# Patient Record
Sex: FEMALE | Race: OTHER | HISPANIC OR LATINO | Employment: OTHER | URBAN - METROPOLITAN AREA
[De-identification: names, ages, dates, MRNs, and addresses within clinical notes are randomized per-mention and may not be internally consistent; named-entity substitution may affect disease eponyms.]

---

## 2018-08-28 ENCOUNTER — HOSPITAL ENCOUNTER (EMERGENCY)
Facility: HOSPITAL | Age: 63
Discharge: HOME | End: 2018-08-28
Attending: EMERGENCY MEDICINE | Admitting: EMERGENCY MEDICINE
Payer: COMMERCIAL

## 2018-08-28 ENCOUNTER — APPOINTMENT (EMERGENCY)
Dept: CT IMAGING | Facility: HOSPITAL | Age: 63
End: 2018-08-28
Payer: COMMERCIAL

## 2018-08-28 ENCOUNTER — APPOINTMENT (EMERGENCY)
Dept: RADIOLOGY | Facility: HOSPITAL | Age: 63
End: 2018-08-28
Payer: COMMERCIAL

## 2018-08-28 VITALS
OXYGEN SATURATION: 95 % | TEMPERATURE: 97.6 F | HEART RATE: 87 BPM | RESPIRATION RATE: 18 BRPM | SYSTOLIC BLOOD PRESSURE: 182 MMHG | DIASTOLIC BLOOD PRESSURE: 98 MMHG | WEIGHT: 233 LBS

## 2018-08-28 DIAGNOSIS — I10 HYPERTENSION, UNSPECIFIED TYPE: Primary | ICD-10-CM

## 2018-08-28 DIAGNOSIS — E11.9 DIABETES MELLITUS (HCC): ICD-10-CM

## 2018-08-28 LAB
ALBUMIN SERPL BCP-MCNC: 4.1 G/DL (ref 3–5.2)
ALP SERPL-CCNC: 80 U/L (ref 43–122)
ALT SERPL W P-5'-P-CCNC: 31 U/L (ref 9–52)
ANION GAP SERPL CALCULATED.3IONS-SCNC: 7 MMOL/L (ref 5–14)
AST SERPL W P-5'-P-CCNC: 21 U/L (ref 14–36)
ATRIAL RATE: 77 BPM
ATRIAL RATE: 81 BPM
BASOPHILS # BLD AUTO: 0.1 THOUSANDS/ΜL (ref 0–0.1)
BASOPHILS NFR BLD AUTO: 1 % (ref 0–1)
BILIRUB SERPL-MCNC: 0.4 MG/DL
BUN SERPL-MCNC: 13 MG/DL (ref 5–25)
CALCIUM SERPL-MCNC: 10 MG/DL (ref 8.4–10.2)
CHLORIDE SERPL-SCNC: 104 MMOL/L (ref 97–108)
CO2 SERPL-SCNC: 28 MMOL/L (ref 22–30)
CREAT SERPL-MCNC: 0.72 MG/DL (ref 0.6–1.2)
EOSINOPHIL # BLD AUTO: 0.1 THOUSAND/ΜL (ref 0–0.4)
EOSINOPHIL NFR BLD AUTO: 2 % (ref 0–6)
ERYTHROCYTE [DISTWIDTH] IN BLOOD BY AUTOMATED COUNT: 16.2 %
GFR SERPL CREATININE-BSD FRML MDRD: 89 ML/MIN/1.73SQ M
GLUCOSE SERPL-MCNC: 114 MG/DL (ref 70–99)
HCT VFR BLD AUTO: 40.3 % (ref 36–46)
HGB BLD-MCNC: 12.9 G/DL (ref 12–16)
LYMPHOCYTES # BLD AUTO: 2.7 THOUSANDS/ΜL (ref 0.5–4)
LYMPHOCYTES NFR BLD AUTO: 33 % (ref 20–50)
MCH RBC QN AUTO: 25 PG (ref 26–34)
MCHC RBC AUTO-ENTMCNC: 32 G/DL (ref 31–36)
MCV RBC AUTO: 78 FL (ref 80–100)
MONOCYTES # BLD AUTO: 0.5 THOUSAND/ΜL (ref 0.2–0.9)
MONOCYTES NFR BLD AUTO: 6 % (ref 1–10)
NEUTROPHILS # BLD AUTO: 4.9 THOUSANDS/ΜL (ref 1.8–7.8)
NEUTS SEG NFR BLD AUTO: 58 % (ref 45–65)
NT-PROBNP SERPL-MCNC: 31.1 PG/ML (ref 0–299)
P AXIS: 38 DEGREES
P AXIS: 45 DEGREES
PLATELET # BLD AUTO: 281 THOUSANDS/UL (ref 150–450)
PLATELET BLD QL SMEAR: ADEQUATE
PMV BLD AUTO: 9.4 FL (ref 8.9–12.7)
POTASSIUM SERPL-SCNC: 3.9 MMOL/L (ref 3.6–5)
PR INTERVAL: 146 MS
PR INTERVAL: 160 MS
PROT SERPL-MCNC: 8.3 G/DL (ref 5.9–8.4)
QRS AXIS: -12 DEGREES
QRS AXIS: -14 DEGREES
QRSD INTERVAL: 90 MS
QRSD INTERVAL: 98 MS
QT INTERVAL: 408 MS
QT INTERVAL: 418 MS
QTC INTERVAL: 461 MS
QTC INTERVAL: 485 MS
RBC # BLD AUTO: 5.15 MILLION/UL (ref 4–5.2)
RBC MORPH BLD: NORMAL
SODIUM SERPL-SCNC: 139 MMOL/L (ref 137–147)
T WAVE AXIS: 20 DEGREES
T WAVE AXIS: 23 DEGREES
TROPONIN I SERPL-MCNC: <0.01 NG/ML (ref 0–0.03)
VENTRICULAR RATE: 77 BPM
VENTRICULAR RATE: 81 BPM
WBC # BLD AUTO: 8.4 THOUSAND/UL (ref 4.5–11)

## 2018-08-28 PROCEDURE — 80053 COMPREHEN METABOLIC PANEL: CPT | Performed by: EMERGENCY MEDICINE

## 2018-08-28 PROCEDURE — 85025 COMPLETE CBC W/AUTO DIFF WBC: CPT | Performed by: EMERGENCY MEDICINE

## 2018-08-28 PROCEDURE — 93010 ELECTROCARDIOGRAM REPORT: CPT | Performed by: INTERNAL MEDICINE

## 2018-08-28 PROCEDURE — 71045 X-RAY EXAM CHEST 1 VIEW: CPT

## 2018-08-28 PROCEDURE — 36415 COLL VENOUS BLD VENIPUNCTURE: CPT | Performed by: EMERGENCY MEDICINE

## 2018-08-28 PROCEDURE — 84484 ASSAY OF TROPONIN QUANT: CPT | Performed by: EMERGENCY MEDICINE

## 2018-08-28 PROCEDURE — 93005 ELECTROCARDIOGRAM TRACING: CPT

## 2018-08-28 PROCEDURE — 70450 CT HEAD/BRAIN W/O DYE: CPT

## 2018-08-28 PROCEDURE — 99284 EMERGENCY DEPT VISIT MOD MDM: CPT

## 2018-08-28 PROCEDURE — 83880 ASSAY OF NATRIURETIC PEPTIDE: CPT | Performed by: EMERGENCY MEDICINE

## 2018-08-28 RX ORDER — SERTRALINE HYDROCHLORIDE 100 MG/1
TABLET, FILM COATED ORAL DAILY
COMMUNITY
End: 2020-01-15

## 2018-08-28 RX ORDER — LISINOPRIL 20 MG/1
20 TABLET ORAL DAILY
Qty: 20 TABLET | Refills: 0 | Status: SHIPPED | OUTPATIENT
Start: 2018-08-28 | End: 2020-03-05 | Stop reason: SDUPTHER

## 2018-08-28 RX ORDER — LISINOPRIL 20 MG/1
20 TABLET ORAL DAILY
COMMUNITY
End: 2020-02-27

## 2018-08-28 RX ORDER — ALBUTEROL SULFATE 90 UG/1
2 AEROSOL, METERED RESPIRATORY (INHALATION) EVERY 6 HOURS PRN
COMMUNITY
End: 2020-02-27

## 2018-08-28 RX ORDER — ATORVASTATIN CALCIUM 20 MG/1
20 TABLET, FILM COATED ORAL DAILY
COMMUNITY
End: 2020-02-27

## 2018-08-28 RX ORDER — ATORVASTATIN CALCIUM 10 MG/1
20 TABLET, FILM COATED ORAL DAILY
Qty: 40 TABLET | Refills: 0 | Status: SHIPPED | OUTPATIENT
Start: 2018-08-28 | End: 2020-01-15

## 2018-08-28 NOTE — ED PROVIDER NOTES
History  Chief Complaint   Patient presents with    High Blood Pressure     pt states ran out of HTN meds 4 days ago, since then c/o dizzy and blurred vision  no headache, no cp/sob      Pt just came from her PCP's office  He sent her here for BP evaluation but he failed to fill her prescriptions INCLUDING her BP med  History provided by:  Patient  Headache   Pain location:  Generalized  Severity at highest:  Unable to specify  Onset quality:  Unable to specify  Timing:  Unable to specify  Progression:  Resolved  Chronicity:  New  Similar to prior headaches: no    Context: not activity, not exposure to bright light, not caffeine, not coughing, not defecating, not eating, not stress, not exposure to cold air, not intercourse, not loud noise and not straining    Context comment:  Out of her BP med for 4 days  Relieved by:  Nothing  Worsened by:  Nothing  Ineffective treatments:  None tried  Associated symptoms: dizziness (lightheadedness)    Associated symptoms: no abdominal pain, no back pain, no blurred vision, no congestion, no cough, no diarrhea, no drainage, no ear pain, no eye pain, no facial pain, no fatigue, no fever, no focal weakness, no hearing loss, no loss of balance, no myalgias, no nausea, no near-syncope, no neck pain, no neck stiffness, no numbness, no paresthesias, no photophobia, no seizures, no sinus pressure, no sore throat, no swollen glands, no syncope, no tingling, no URI, no visual change, no vomiting and no weakness        Prior to Admission Medications   Prescriptions Last Dose Informant Patient Reported? Taking?    albuterol (PROVENTIL HFA,VENTOLIN HFA) 90 mcg/act inhaler   Yes Yes   Sig: Inhale 2 puffs every 6 (six) hours as needed for wheezing   atorvastatin (LIPITOR) 20 mg tablet   Yes Yes   Sig: Take 20 mg by mouth daily   lisinopril (ZESTRIL) 20 mg tablet   Yes Yes   Sig: Take 20 mg by mouth daily   metFORMIN (GLUCOPHAGE) 500 mg tablet   Yes Yes   Sig: Take 500 mg by mouth 2 (two) times a day with meals   sertraline (ZOLOFT) 100 mg tablet   Yes Yes   Sig: Take by mouth daily      Facility-Administered Medications: None       Past Medical History:   Diagnosis Date    Asthma     Depression     Diabetes mellitus ([facility] [address] )     Hypertension     Kidney stone        Past Surgical History:   Procedure Laterality Date    APPENDECTOMY      CHOLECYSTECTOMY         History reviewed  No pertinent family history  I have reviewed and agree with the history as documented  Social History   Substance Use Topics    Smoking status: Never Smoker    Smokeless tobacco: Never Used    Alcohol use No        Review of Systems   Constitutional: Negative  Negative for fatigue and fever  HENT: Negative  Negative for congestion, ear pain, hearing loss, postnasal drip, sinus pressure and sore throat  Eyes: Negative  Negative for blurred vision, photophobia and pain  Respiratory: Negative  Negative for cough  Cardiovascular: Negative  Negative for syncope and near-syncope  Gastrointestinal: Negative  Negative for abdominal pain, diarrhea, nausea and vomiting  Endocrine: Negative  Genitourinary: Negative  Musculoskeletal: Negative  Negative for back pain, myalgias, neck pain and neck stiffness  Skin: Negative  Allergic/Immunologic: Negative  Neurological: Positive for dizziness (lightheadedness) and headaches  Negative for focal weakness, seizures, weakness, numbness, paresthesias and loss of balance  Hematological: Negative  Psychiatric/Behavioral: Negative  Physical Exam  Physical Exam   Constitutional: She is oriented to person, place, and time  She appears well-developed and well-nourished  No distress  Leg very comfortable structure wall texting on her fall without any difficulty  Nontoxic-appearing easily smiling very pleasant to the Farhat Shireen her young daughter is at the bedside the for the bed   HENT:   Head: Normocephalic and atraumatic     Right Ear: External ear normal    Left Ear: External ear normal    Nose: Nose normal    Mouth/Throat: Oropharynx is clear and moist    Eyes: Conjunctivae and EOM are normal  Pupils are equal, round, and reactive to light  Neck: Normal range of motion  Neck supple  No JVD present  No tracheal deviation present  No thyromegaly present  Cardiovascular: Normal rate, regular rhythm, normal heart sounds and intact distal pulses  Exam reveals no gallop and no friction rub  No murmur heard  Pulmonary/Chest: Effort normal and breath sounds normal  No stridor  No respiratory distress  She has no wheezes  She has no rales  She exhibits no tenderness  Abdominal: Soft  Bowel sounds are normal  She exhibits no distension and no mass  There is no tenderness  There is no rebound and no guarding  No hernia  Genitourinary: Rectal exam shows guaiac negative stool  Musculoskeletal: Normal range of motion  She exhibits no edema, tenderness or deformity  Lymphadenopathy:     She has no cervical adenopathy  Neurological: She is alert and oriented to person, place, and time  She displays normal reflexes  No cranial nerve deficit or sensory deficit  She exhibits normal muscle tone  Coordination normal    Skin: Skin is warm and dry  Capillary refill takes less than 2 seconds  No rash noted  She is not diaphoretic  No erythema  No pallor  Psychiatric: She has a normal mood and affect  Her behavior is normal  Judgment and thought content normal    Nursing note and vitals reviewed        Vital Signs  ED Triage Vitals   Temperature Pulse Respirations Blood Pressure SpO2   08/28/18 1458 08/28/18 1458 08/28/18 1458 08/28/18 1458 08/28/18 1458   97 6 °F (36 4 °C) 86 18 (!) 183/101 96 %      Temp Source Heart Rate Source Patient Position - Orthostatic VS BP Location FiO2 (%)   08/28/18 1458 08/28/18 1741 08/28/18 1458 08/28/18 1458 --   Temporal Monitor Lying Left arm       Pain Score       --                  Vitals:    08/28/18 1458 08/28/18 1536 08/28/18 1741   BP: (!) 183/101 152/88 (!) 182/98   Pulse: 86  87   Patient Position - Orthostatic VS: Lying Lying Lying       Visual Acuity      ED Medications  Medications - No data to display    Diagnostic Studies  Results Reviewed     Procedure Component Value Units Date/Time    BNP [[id number]]  (Normal) Collected:  08/28/18 1555    Lab Status:  Final result Specimen:  Blood from Arm, Left Updated:  08/28/18 1657     NT-proBNP 31 1 pg/mL     Troponin I [[id number]]  (Normal) Collected:  08/28/18 1510    Lab Status:  Final result Specimen:  Blood from Arm, Left Updated:  08/28/18 1608     Troponin I <0 01 ng/mL     Comprehensive metabolic panel [[id number]]  (Abnormal) Collected:  08/28/18 1510    Lab Status:  Final result Specimen:  Blood from Arm, Left Updated:  08/28/18 1551     Sodium 139 mmol/L      Potassium 3 9 mmol/L      Chloride 104 mmol/L      CO2 28 mmol/L      ANION GAP 7 mmol/L      BUN 13 mg/dL      Creatinine 0 72 mg/dL      Glucose 114 (H) mg/dL      Calcium 10 0 mg/dL      AST 21 U/L      ALT 31 U/L      Alkaline Phosphatase 80 U/L      Total Protein 8 3 g/dL      Albumin 4 1 g/dL      Total Bilirubin 0 40 mg/dL      eGFR 89 ml/min/1 73sq m     Narrative:         National Kidney Disease Education Program recommendations are as follows:  GFR calculation is accurate only with a steady state creatinine  Chronic Kidney disease less than 60 ml/min/1 73 sq  meters  Kidney failure less than 15 ml/min/1 73 sq  meters      CBC and differential [[id number]]  (Abnormal) Collected:  08/28/18 1510    Lab Status:  Final result Specimen:  Blood from Arm, Left Updated:  08/28/18 1536     WBC 8 40 Thousand/uL      RBC 5 15 Million/uL      Hemoglobin 12 9 g/dL      Hematocrit 40 3 %      MCV 78 (L) fL      MCH 25 0 (L) pg      MCHC 32 0 g/dL      RDW 16 2 (H) %      MPV 9 4 fL      Platelets 049 Thousands/uL      Neutrophils Relative 58 %      Lymphocytes Relative 33 %      Monocytes Relative 6 % Eosinophils Relative 2 %      Basophils Relative 1 %      Neutrophils Absolute 4 90 Thousands/µL      Lymphocytes Absolute 2 70 Thousands/µL      Monocytes Absolute 0 50 Thousand/µL      Eosinophils Absolute 0 10 Thousand/µL      Basophils Absolute 0 10 Thousands/µL                  CT head without contrast   Final Result by Lucia Navas MD (08/28 1638)      No acute intracranial abnormality  Workstation performed: [id number]         XR chest 1 view portable   Final Result by Lucia Navas MD (08/28 1627)      No acute cardiopulmonary disease  Workstation performed: [id number]                    Procedures  Procedures       Phone Contacts  ED Phone Contact    ED Course  ED Course as of Aug 28 2057   Tue Aug 28, 2018   1615 Spoke with paternal grandson here in the emergency department  He is bilingual   Talking about his grandmother having some anxiety issue  Spoke with him and asked why dated that discussed this with her doctor who is office the just left       1372  is at the bedside  Our ER Abdirahman jha ed translate to the nothing was lost in translation  Both the patient and the patient's  denies that she has any anxiety issues  1639 Informed them that they are going to have a very interesting family dinner tonight                                MDM  Number of Diagnoses or Management Options  Diabetes mellitus ([facility] [address] ):    Hypertension, unspecified type:      Amount and/or Complexity of Data Reviewed  Clinical lab tests: ordered and reviewed  Tests in the radiology section of CPT®: ordered and reviewed  Tests in the medicine section of CPT®: ordered and reviewed    Patient Progress  Patient progress: improved    CritCare Time    Disposition  Final diagnoses:   Hypertension, unspecified type   Diabetes mellitus ([facility] [address] )     Time reflects when diagnosis was documented in both MDM as applicable and the Disposition within this note     Time User Action Codes Description Comment    8/28/2018  5:09 PM Janeen CASH Add [I10] Hypertension, unspecified type     8/28/2018  5:10 PM Carlos Alberto Farleys Add [E11 9] Diabetes mellitus [facility])       ED Disposition     ED Disposition Condition Comment    Discharge  Asim Comes discharge to home/self care  Condition at discharge: Stable        Follow-up Information     Follow up With Specialties Details Why Contact Info    With your new primary care provider that you saw today  In 1 day            Discharge Medication List as of 8/28/2018  5:15 PM      START taking these medications    Details   !! atorvastatin (LIPITOR) 10 mg tablet Take 2 tablets (20 mg total) by mouth daily, Starting Tue 8/28/2018, Print      !! lisinopril (ZESTRIL) 20 mg tablet Take 1 tablet (20 mg total) by mouth daily, Starting Tue 8/28/2018, Print      !! metFORMIN (GLUCOPHAGE) 500 mg tablet Take 1 tablet (500 mg total) by mouth 2 (two) times a day with meals, Starting Tue 8/28/2018, Print       !! - Potential duplicate medications found  Please discuss with provider  CONTINUE these medications which have NOT CHANGED    Details   albuterol (PROVENTIL HFA,VENTOLIN HFA) 90 mcg/act inhaler Inhale 2 puffs every 6 (six) hours as needed for wheezing, Historical Med      !! atorvastatin (LIPITOR) 20 mg tablet Take 20 mg by mouth daily, Historical Med      !! lisinopril (ZESTRIL) 20 mg tablet Take 20 mg by mouth daily, Historical Med      !! metFORMIN (GLUCOPHAGE) 500 mg tablet Take 500 mg by mouth 2 (two) times a day with meals, Historical Med      sertraline (ZOLOFT) 100 mg tablet Take by mouth daily, Historical Med       !! - Potential duplicate medications found  Please discuss with provider  No discharge procedures on file      ED Provider  Electronically Signed by           Emelia Diaz MD  08/28/18 0755

## 2018-08-28 NOTE — ED NOTES
Patient coming from PCP office for HTN, reports 4 days without medications  C/o dizziness, blurred vision   Denies chest pain, shortness of breath, headache, nvd       Randy Short RN  08/28/18 5877

## 2018-08-28 NOTE — DISCHARGE INSTRUCTIONS
Hipertensión crónica   LO QUE NECESITA SABER:   La hipertensión es la presión arterial sandor  La presión arterial es la fuerza que ejerce la dominguez contra las saldana de las arterias  La presión arterial normal debería estar a menos de 120/80  La pre-hipertensión estaría entre 120/80 y 139/ 80  La presión arterial sandor estaría a 140/90 o más sandor  La hipertensión causa que vallecillo presión arterial se eleve tanto que vallecillo corazón se ve forzado a trabajar [employer] de lo normal  [place] puede dañar vallecillo corazón  La hipertensión crónica es lawrence condición de joey plazo que usted puede controlar con un estilo de [place] o con medicamentos  La presión [place] a proteger fiona órganos gavi vallecillo corazón, pulmones, cerebro, y riñones  INSTRUCCIONES SOBRE EL SANDOR HOSPITALARIA:   Llame al 911 en luisa de presentar lo siguiente:   · Usted tiene malestar en el pecho que se siente gavi estrujamiento, presión, Ema Marines o dolor  · Usted se siente confundido o tiene dificultad para hablar  · Repentinamente se siente aturdido o con dificultad para respirar  · Usted tiene dolor o [employer] espalda, [place], [place], abdomen o Lou Catching  Busque atención médica de inmediato si:   · Usted tiene un forrest dolor de radames o pérdida de la visión  · Usted tiene debilidad en un brazo o en lawrence pierna  Pregúntele a vallecillo Tiffany Rumps vitaminas y minerales son adecuados para usted  · Usted se siente mareado, confundido, somnoliento o gavi si se fuera a desmayar  · Usted se ha tomado vallecillo medicamento para la presión arterial yong vallecillo presión arterial todavía está más sandor de lo que le indicó vallecillo médico     · Usted tiene preguntas o inquietudes acerca de vallecillo condición o cuidado  Medicamentos:  Es posible que usted necesite alguno de los siguientes:  · Medicamento  podría usarse para ayudar a disminuir la presión arterial  Es posible que necesite más de un tipo de [place]  [place] el medicamento exactamente gavi indicado  · Diuréticos  ayudan a eliminar el exceso de líquido que se acumula en el organismo  [place] contribuirá a bajar vallecillo presión arterial  Es posible que orine más seguido mientras emma dalia medicamento  · Los medicamentos para el colesterol  ayudan a bajar los niveles de [place]  Un nivel bajo de colesterol ayuda a prevenir enfermedades cardíacas y facilita el control de la presión arterial      · [place] fiona medicamentos gavi se le haya indicado  Consulte con vallecillo médico si usted apoorva que vallecillo medicamento no le está ayudando o si presenta efectos secundarios  Infórmele si es alérgico a cualquier medicamento  Mantenga lawrence lista actualizada de los [place], las vitaminas y los productos herbales que emma  Incluya los siguientes datos de los medicamentos: cantidad, frecuencia y motivo de administración  Traiga con usted la lista o los envases de la píldoras a fiona citas de seguimiento  Lleve la lista de los medicamentos con usted en luisa de lawrence emergencia  Acuda a fiona consultas de control con vallecillo médico según le indicaron  Usted necesitará regresar para medir vallecillo presión arterial y realizar otros exámenes de laboratorio  Anote fiona preguntas para que se acuerde de hacerlas sumit fiona visitas  Controle la hipertensión crónica:  Hable con vallecillo médico sobre las siguientes recomendaciones y otras formas de controlar la hipertensión:  · Tómese la presión arterial en vallecillo casa  Siéntese y descanse por 5 minutos antes de tomarse la presión arterial  Extienda vallecillo brazo y apóyelo en lawrence superficie plana  Vallecillo brazo debe estar a la misma altura que vallecillo corazón  Siga las instrucciones que vienen con el monitor para la presión arterial o tensiómetro  Si es posible tome por lo menos 2 lecturas de la presión cada vez  Tómese la presión arterial por lo [employer] al día a la misma hora todos los días, lawrence en la mañana y la otra en la noche   Mantenga un registro de las lecturas de vallecillo presión arterial y llévelo consigo a fiona consultas  Pregúntele a vallecillo médico cuál debería ser vallecillo presión arterial            · Limite el sodio (la sal) gavi se le haya indicado  Demasiado sodio puede afectar el equilibrio de líquidos  Revise las etiquetas para buscar alimentos bajos en sodio o sin sal agregada  Algunos alimentos bajos en sodio utilizan sales de potasio para añadir sabor  Demasiado potasio también puede causar problemas de [place]  Vallecillo médico le dirá qué cantidad de sodio y potasio es ramos para el consumo en un día  Él puede recomendarle que limite el sodio a 2,300 mg al día  · Siga el plan de comidas recomendado por vallecillo médico   Un dietista o médico puede darle más información sobre planes de bajo contenido de sodio o el plan de alimentación DASH (enfoques dietéticos para detener la hipertensión)  El plan DASH es bajo en sodio, grasas saturadas y grasa total  Es alto en potasio, calcio y [place]  · Ejercítese para mantener un peso saludable  Realice actividad física por lo menos 30 minutos al día, la mayoría de los días de la [place]  [place] ayudará a bajar vallecillo presión arterial  Pida más información acerca de un plan de ejercicio adecuado para [facility]  · [address] [place] [address] estrés  [place] podría ayudarlo a bajar vallecillo presión arterial  Aprenda sobre formas de relajarse, gavi respiración profunda o escuchar música  · Limite el consumo de alcohol  Las mujeres deberían limitar el consumo de alcohol a 1 bebida por día  Los hombres deberían limitar el consumo de alcohol a 2 tragos al día  Un trago equivale a 12 onzas de cerveza, 5 onzas de vino o 1 onza y ½ de licor  · No fume  La nicotina y otros químicos en los cigarrillos y cigarros pueden aumentar vallecillo presión arterial y también pueden provocar daño al pulmón  Pida información a vallecillo médico si usted actualmente fuma y necesita ayuda para dejar de fumar  Los cigarrillos electrónicos o tabaco sin humo todavía contienen nicotina   Consulte con vallecillo médico antes de [employer] productos  © 2017 [facility] Information is for End User's use only and may not be sold, redistributed or otherwise used for commercial purposes  All illustrations and images included in CareNotes® are the copyrighted property of A D A M , Inc  or Brad Anne  Esta información es sólo para uso en educación  Vallecillo intención no es darle un consejo médico sobre enfermedades o tratamientos  Colsulte con vallecillo Maunel Cranker farmacéutico antes de seguir cualquier régimen médico para saber si es seguro y efectivo para usted  10% - bad control"> 10% - bad control,Hemoglobin A1c (HbA1c) greater than 10% indicating poor diabetic control,Haemoglobin A1c greater than 10% indicating poor diabetic control">   Diabetes Mellitus Type 2 in Adults, Ambulatory Care   American Diabetes Association : Standards of medical care in diabetes--2014  Diabetes Care 2014; 40 Suppl 1:S14-S80  American Diabetes Association: Standards of medical care in diabetes--2013  Diabetes Care 2013; 36 Suppl 1:S11-S66  American Diabetes Association: Standards of medical care in diabetes--2012  Diabetes Care 2012; 35 Suppl 1:S11-S63  Advisory Committee on [employer]: Recommended Adult Immunization Schedule: [place], 2012  Karlie Intern Med 2012; 156(3):211-217  American Diabetes Association: Standards of Medical Care in Diabetes-2011  Diabetes Care 2011; 34(S1):S11-S61  Product Information: JANUVIA(R) oral tablets, sitagliptin oral tablets  [address] [place], [place], 2010  American Diabetes Association: Standards of medical care in diabetes--2010  Diabetes Care 2010; 33 Suppl 1:S11-S61  Product Information: VICTOZA(R) solution for subcutaneous injection, liraglutide rDNA origin solution for subcutaneous injection  [address] [address]  American Diabetes Association: Diagnosis and classification of diabetes mellitus  Diabetes Care, Jan 2005;28(suppl 1):S37-S42    American Diabetes Association: Physical activity/exercise and diabetes mellitus  Diabetes Care, 2003;26:S73-S77  American Diabetes Association : Standards of medical care for patients with diabetes mellitus    Diabetes Care  , 2003; 26(suppl 1):33S-50S  Leigh BK : Diabetes mellitus and its chronic complications    AORN J  , 2002; 76(2):266-282  Bruce CG : Current management of diabetes mellitus in adults  [facility]  , 2003; 11(5):32-37  Marshall Johnson C : Risk factors for type 2 diabetes mellitus  Tamar Olive Cardiovasc Nurs,   2002; 16(2):17-23  Tonny Kendrick DW : Pathophysiology of diabetes mellitus    [address] , 2004; 27(2):113-125  Mary Lou LF, Aaron COUCH, João MN : In-hospital management of type 2 diabetes mellitus    [facility] Am  , 2004; 03:5147-7268  Erwin Herrera, Kala I, Zeus K et al  : Evidence-based nutritional approaches to the treatment and prevention of diabetes mellitus    Nutr Metab Cardiovasc Dis  , 2004; 14(6):373-394  Cinthia Medrano DM  : Clinical practice  Initial management of glycemia in type 2 diabetes mellitus  [facility]  , 2002; 347(17):5315-3446  Brook WALKER : Mechanisms in the development of type 2 diabetes mellitus  Tamradevante Nayak Caradiovasc Nurs  , 2002; 16(2):1-16  American Diabetes Association[de-identified] Clinical Practice Recommendations 2003    Diabetes Care  Available at http://care  diabetesjournals  org/content/vol26/suppl_1/ , (cited 10/22/03)  American Diabetes Association[de-identified] Clinical Practice Recommendations 2004    Diabetes Care 2004; 27(1): S5-S10  Available at: http://care  diabetesjournals  org/content/vol27/suppl_1/, (cited 2/25/04)  American Diabetes Association[de-identified] Alcohol    2003  Available at: www diabetes  org/health/nutrition/alcohol/alcohol jsp , (cited 10/23/03)  American Diabetes Association[de-identified] Hyperosmolar Hyperglycemic Nonketotic Syndrome (HHNS):   2003  Available at: www diabetes  org/type2/medical/HHNS jsp, (cited 10/29/03)    American Diabetes Association[de-identified] Nutrition Guide for People with Diabetes    2003  Available at: www diabetes  org/health/nutrition/nutrition_guide  jsp , (cited 10/22/03)  American Diabetes Association[de-identified] Safety Tips    2003  Available at: www diabetes  org/health/exercise/safety/25ways  jsp, (cited 10/23/03)  American Diabetes Association[de-identified] What is Type 2 Diabetes?   2003  Available at: www diabetes  org/type2/info/default jsp, (cited 10/29/03)  The American Dietetic Association: The [place] Dietetic Association and the [facility] Dietetic Association Manual of Clinical Dietetics, 6th ed    [address] , 2000  Lisa Sanchez [de-identified] The Fundability Manual of Diagnosis and Therapy (Internet Edition, 2003)  [facility]  Shari Sweeney Available at www [facility]/The [place] Reported/mmanual/home jsp , (2003)  Dorice Patch DM[de-identified] Initial Management of Glycemia in Type 2 Diabetes Mellitus    The [address] , 2002; 347(17):7561-6991  Automatic Data of Diabetes and Digestive and Kidney Diseases[de-identified] Diabetes Diagnosis    2003  Available at: http://diabetes  niddk nih gov/dm/pubs/diagnosis/index htm , (cited 10/23/2003)  Lisestt Sheets : The Redd Manual of Nursing Practice  7th ed  TAYE Graham: Alonso Thorpe, 2000  Frankie SHELTON, Taylor Velez GP et al :: Effects of Exercise on Glycemic Control and Body Mass in Type 2 Diabetes Mellitus    The Journal of the [address] , 2001; [id number]): 2181-6305  © 2014 [address] is for End User's use only and may not be sold, redistributed or otherwise used for commercial purposes  All illustrations and images included in CareNotes® are the copyrighted property of [employer]  or Brad Anne  The above information is an  only  It is not intended as medical advice for individual conditions or treatments   Talk to your doctor, nurse or pharmacist before following any medical regimen to see if it is safe and effective for you  Chronic Hypertension   WHAT YOU NEED TO KNOW:   Hypertension is high blood pressure (BP)  Your BP is the force of your blood moving against the walls of your arteries  Normal BP is less than 120/80  Prehypertension is between 120/80 and 139/89  Hypertension is 140/90 or higher  Hypertension causes your BP to get so high that your heart has to work much harder than normal  This can damage your heart  Chronic hypertension is a long-term condition that you can control with a healthy lifestyle or medicines  A controlled blood pressure helps protect your organs, such as your heart, lungs, brain, and kidneys  DISCHARGE INSTRUCTIONS:   Call 911 for any of the following:   · You have discomfort in your chest that feels like squeezing, pressure, fullness, or pain  · You become confused or have difficulty speaking  · You suddenly feel lightheaded or have trouble breathing  · You have pain or discomfort in your back, neck, jaw, stomach, or arm  Seek care immediately if:   · You have a severe headache or vision loss  · You have weakness in an arm or leg  Contact your healthcare provider if:   · You feel faint, dizzy, confused, or drowsy  · You have been taking your BP medicine and your BP is still higher than your healthcare provider says it should be  · You have questions or concerns about your condition or care  Medicines: You may need any of the following:  · Medicine  may be used to help lower your BP  You may need more than one type of medicine  Take the medicine exactly as directed  · Diuretics  help decrease extra fluid that collects in your body  This will help lower your BP  You may urinate more often while you take this medicine  · Cholesterol medicine  helps lower your cholesterol level  A low cholesterol level helps prevent heart disease and makes it easier to control your blood pressure  · Take your medicine as directed    Contact your healthcare provider if you think your medicine is not helping or if you have side effects  Tell him or her if you are allergic to any medicine  Keep a list of the medicines, vitamins, and herbs you take  Include the amounts, and when and why you take them  Bring the list or the pill bottles to follow-up visits  Carry your medicine list with you in case of an emergency  Follow up with your healthcare provider as directed: You will need to return to have your blood pressure checked and to have other lab tests done  Write down your questions so you remember to ask them during your visits  Manage chronic hypertension:  Talk with your healthcare provider about these and other ways to manage hypertension:  · Take your BP at home  Sit and rest for 5 minutes before you take your BP  Extend your arm and support it on a flat surface  Your arm should be at the same level as your heart  Follow the directions that came with your BP monitor  If possible, take at least 2 BP readings each time  Take your BP at least twice a day at the same times each day, such as morning and evening  Keep a record of your BP readings and bring it to your follow-up visits  Ask your healthcare provider what your blood pressure should be  · Limit sodium (salt) as directed  Too much sodium can affect your fluid balance  Check labels to find low-sodium or no-salt-added foods  Some low-sodium foods use potassium salts for flavor  Too much potassium can also cause health problems  Your healthcare provider will tell you how much sodium and potassium are safe for you to have in a day  He or she may recommend that you limit sodium to 2,300 mg a day  · Follow the meal plan recommended by your healthcare provider  A dietitian or your provider can give you more information on low-sodium plans or the DASH (Dietary Approaches to Stop Hypertension) eating plan  The DASH plan is low in sodium, unhealthy fats, and total fat   It is high in potassium, calcium, and fiber  · Exercise to maintain a healthy weight  Exercise at least 30 minutes per day, on most days of the week  This will help decrease your blood pressure  Ask about the best exercise plan for you  · Decrease stress  This may help lower your BP  Learn ways to relax, such as deep breathing or listening to music  · Limit alcohol  Women should limit alcohol to 1 drink a day  Men should limit alcohol to 2 drinks a day  A drink of alcohol is 12 ounces of beer, 5 ounces of wine, or 1½ ounces of liquor  · Do not smoke  Nicotine and other chemicals in cigarettes and cigars can increase your BP and also cause lung damage  Ask your healthcare provider for information if you currently smoke and need help to quit  E-cigarettes or smokeless tobacco still contain nicotine  Talk to your healthcare provider before you use these products  © 2017 [address] Information is for End User's use only and may not be sold, redistributed or otherwise used for commercial purposes  All illustrations and images included in CareNotes® are the copyrighted property of A D A M , Inc  or Brad Anne  The above information is an  only  It is not intended as medical advice for individual conditions or treatments  Talk to your doctor, nurse or pharmacist before following any medical regimen to see if it is safe and effective for you  10% - bad control"> 10% - bad control,Hemoglobin A1c (HbA1c) greater than 10% indicating poor diabetic control,Haemoglobin A1c greater than 10% indicating poor diabetic control">   Diabetes Mellitus Type 2 in Adults, Ambulatory Care   American Diabetes Association : Standards of medical care in diabetes--2014  Diabetes Care 2014; 40 Suppl 1:S14-S80  American Diabetes Association: Standards of medical care in diabetes--2013  Diabetes Care 2013; 36 Suppl 1:S11-S66  American Diabetes Association: Standards of medical care in diabetes--2012   Diabetes Care 2012; 35 Suppl 1:S11-S63  Advisory Committee on [employer]: Recommended Adult Immunization Schedule: [place], 2012  Karlie Intern Med 2012; 156(3):211-217  American Diabetes Association: Standards of Medical Care in Diabetes-2011  Diabetes Care 2011; 34(S1):S11-S61  Product Information: JANUVIA(R) oral tablets, sitagliptin oral tablets  [address] [place], [place], 2010  American Diabetes Association: Standards of medical care in diabetes--2010  Diabetes Care 2010; 33 Suppl 1:S11-S61  Product Information: VICTOZA(R) solution for subcutaneous injection, liraglutide rDNA origin solution for subcutaneous injection  [address], [place], [facility]  American Diabetes Association: Diagnosis and classification of diabetes mellitus  Diabetes Care, Jan 2005;28(suppl 1):S37-S42  American Diabetes Association: Physical activity/exercise and diabetes mellitus  Diabetes Care, 2003;26:S73-S77  American Diabetes Association : Standards of medical care for patients with diabetes mellitus    Diabetes Care  , 2003; 26(suppl 1):33S-50S  Leigh BK : Diabetes mellitus and its chronic complications    AORN J  , 2002; 76(2):266-282  Bruce CG : Current management of diabetes mellitus in adults  [facility]  , 2003; 11(5):32-37  [facility] acute hospital C : Risk factors for type 2 diabetes mellitus  Michael Butterfield Cardiovasc Nurs,   2002; 16(2):17-23  Tonny Gordon : Pathophysiology of diabetes mellitus    [address] , 2004; 27(2):113-125  Mary Lou LF, Aaron MA, João MN : In-hospital management of type 2 diabetes mellitus    Med [facility] Am  , 2004; 42:7399-5887  Ankur Amaya, Kala I, Zeus K et al  : Evidence-based nutritional approaches to the treatment and prevention of diabetes mellitus    Nutr Metab Cardiovasc Dis  , 2004; 14(6):373-394  Isabella Seals DM  : Clinical practice  Initial management of glycemia in type 2 diabetes mellitus   Arelieileen Montiels J Med  , 2002; 347(22):7567-7737  [place] WALKER : Mechanisms in the development of type 2 diabetes mellitus  [facility]  , 2002; 16(2):1-16  American Diabetes Association[de-identified] Clinical Practice Recommendations 2003    Diabetes Care  Available at http://care  diabetesjournals  org/content/vol26/suppl_1/ , (cited 10/22/03)  American Diabetes Association[de-identified] Clinical Practice Recommendations 2004    Diabetes Care 2004; 27(1): S5-S10  Available at: http://care  diabetesjournals  org/content/vol27/suppl_1/, (cited 2/25/04)  American Diabetes Association[de-identified] Alcohol    2003  Available at: www diabetes  org/health/nutrition/alcohol/alcohol jsp , (cited 10/23/03)  American Diabetes Association[de-identified] Hyperosmolar Hyperglycemic Nonketotic Syndrome (HHNS):   2003  Available at: www diabetes  org/type2/medical/HHNS jsp, (cited 10/29/03)  American Diabetes Association[de-identified] Nutrition Guide for People with Diabetes    2003  Available at: www diabetes  org/health/nutrition/nutrition_guide  jsp , (cited 10/22/03)  American Diabetes Association[de-identified] Safety Tips    2003  Available at: www diabetes  org/health/exercise/safety/25ways  jsp, (cited 10/23/03)  American Diabetes Association[de-identified] What is Type 2 Diabetes?   2003  Available at: www diabetes  org/type2/info/default jsp, (cited 10/29/03)  The American Dietetic Association: The [place] Dietetic Association and the [facility] Dietetic Association Manual of Clinical Dietetics, 6th ed    [address] , 2000  Kailyn Jimenez [de-identified] The [employer] Manual of Diagnosis and Therapy (Internet Edition, 2003)  Medical Services, [facility]  Usman Jimenez Available at www [employer]iseed/mmanual/home jsp , (2003)  Davene Paget DM[de-identified] Initial Management of Glycemia in Type 2 Diabetes Mellitus    The [place] [address] of Medicine , 2002; 347(17):4535-6004  Automatic Data of Diabetes and Digestive and Kidney Diseases[de-identified] Diabetes Diagnosis    2003   Available at: http://diabetes  niddk nih gov/dm/pubs/diagnosis/index htm , (cited 10/23/2003)  Neli Asp : The Redd Manual of Nursing Practice  7th ed  TAYE Nassar: [place], 2000  Robyn Mercer et al :: Effects of Exercise on Glycemic Control and Body Mass in Type 2 Diabetes Mellitus    The Journal of the [address] [place] [address] , 2001; [id number]): 3850-2049  © 2014 [address] is for End User's use only and may not be sold, redistributed or otherwise used for commercial purposes  All illustrations and images included in CareNotes® are the copyrighted property of [employer]  or Brad Anne  The above information is an  only  It is not intended as medical advice for individual conditions or treatments  Talk to your doctor, nurse or pharmacist before following any medical regimen to see if it is safe and effective for you

## 2018-11-29 ENCOUNTER — TRANSCRIBE ORDERS (OUTPATIENT)
Dept: ADMINISTRATIVE | Facility: HOSPITAL | Age: 63
End: 2018-11-29

## 2018-11-29 DIAGNOSIS — Z12.31 VISIT FOR SCREENING MAMMOGRAM: Primary | ICD-10-CM

## 2018-12-17 ENCOUNTER — HOSPITAL ENCOUNTER (OUTPATIENT)
Dept: MAMMOGRAPHY | Facility: CLINIC | Age: 63
Discharge: HOME | End: 2018-12-17
Payer: COMMERCIAL

## 2018-12-17 VITALS — WEIGHT: 233 LBS | HEIGHT: 61 IN | BODY MASS INDEX: 43.99 KG/M2

## 2018-12-17 DIAGNOSIS — Z12.31 VISIT FOR SCREENING MAMMOGRAM: ICD-10-CM

## 2018-12-17 PROCEDURE — 77067 SCR MAMMO BI INCL CAD: CPT

## 2019-11-15 ENCOUNTER — APPOINTMENT (OUTPATIENT)
Dept: LAB | Facility: HOSPITAL | Age: 64
End: 2019-11-15
Payer: COMMERCIAL

## 2019-11-15 ENCOUNTER — TRANSCRIBE ORDERS (OUTPATIENT)
Dept: ADMINISTRATIVE | Facility: HOSPITAL | Age: 64
End: 2019-11-15

## 2019-11-15 DIAGNOSIS — Z79.899 ENCOUNTER FOR LONG-TERM (CURRENT) USE OF OTHER MEDICATIONS: ICD-10-CM

## 2019-11-15 DIAGNOSIS — F31.32 MODERATE DEPRESSED BIPOLAR I DISORDER (HCC): Primary | ICD-10-CM

## 2019-11-15 DIAGNOSIS — F31.32 MODERATE DEPRESSED BIPOLAR I DISORDER (HCC): ICD-10-CM

## 2019-11-15 LAB
25(OH)D3 SERPL-MCNC: 24.2 NG/ML (ref 30–100)
ALBUMIN SERPL BCP-MCNC: 4.3 G/DL (ref 3–5.2)
ALP SERPL-CCNC: 77 U/L (ref 43–122)
ALT SERPL W P-5'-P-CCNC: 29 U/L (ref 9–52)
ANION GAP SERPL CALCULATED.3IONS-SCNC: 5 MMOL/L (ref 5–14)
AST SERPL W P-5'-P-CCNC: 23 U/L (ref 14–36)
BASOPHILS # BLD AUTO: 0.1 THOUSANDS/ΜL (ref 0–0.1)
BASOPHILS NFR BLD AUTO: 1 % (ref 0–1)
BILIRUB SERPL-MCNC: 0.5 MG/DL
BUN SERPL-MCNC: 14 MG/DL (ref 5–25)
CALCIUM SERPL-MCNC: 9.9 MG/DL (ref 8.4–10.2)
CHLORIDE SERPL-SCNC: 106 MMOL/L (ref 97–108)
CHOLEST SERPL-MCNC: 214 MG/DL
CO2 SERPL-SCNC: 30 MMOL/L (ref 22–30)
CREAT SERPL-MCNC: 0.86 MG/DL (ref 0.6–1.2)
EOSINOPHIL # BLD AUTO: 0.1 THOUSAND/ΜL (ref 0–0.4)
EOSINOPHIL NFR BLD AUTO: 2 % (ref 0–6)
ERYTHROCYTE [DISTWIDTH] IN BLOOD BY AUTOMATED COUNT: 15 %
FOLATE SERPL-MCNC: 12 NG/ML (ref 3.1–17.5)
GFR SERPL CREATININE-BSD FRML MDRD: 72 ML/MIN/1.73SQ M
GLUCOSE P FAST SERPL-MCNC: 136 MG/DL (ref 70–99)
HCT VFR BLD AUTO: 42.7 % (ref 36–46)
HDLC SERPL-MCNC: 39 MG/DL
HGB BLD-MCNC: 13.8 G/DL (ref 12–16)
LDLC SERPL CALC-MCNC: 154 MG/DL
LYMPHOCYTES # BLD AUTO: 2.8 THOUSANDS/ΜL (ref 0.5–4)
LYMPHOCYTES NFR BLD AUTO: 36 % (ref 25–45)
MCH RBC QN AUTO: 26.3 PG (ref 26–34)
MCHC RBC AUTO-ENTMCNC: 32.3 G/DL (ref 31–36)
MCV RBC AUTO: 81 FL (ref 80–100)
MONOCYTES # BLD AUTO: 0.5 THOUSAND/ΜL (ref 0.2–0.9)
MONOCYTES NFR BLD AUTO: 7 % (ref 1–10)
NEUTROPHILS # BLD AUTO: 4.2 THOUSANDS/ΜL (ref 1.8–7.8)
NEUTS SEG NFR BLD AUTO: 54 % (ref 45–65)
NONHDLC SERPL-MCNC: 175 MG/DL
PLATELET # BLD AUTO: 303 THOUSANDS/UL (ref 150–450)
PMV BLD AUTO: 10.2 FL (ref 8.9–12.7)
POTASSIUM SERPL-SCNC: 4.1 MMOL/L (ref 3.6–5)
PROT SERPL-MCNC: 8.3 G/DL (ref 5.9–8.4)
RBC # BLD AUTO: 5.24 MILLION/UL (ref 4–5.2)
SODIUM SERPL-SCNC: 141 MMOL/L (ref 137–147)
TRIGL SERPL-MCNC: 104 MG/DL
TSH SERPL DL<=0.05 MIU/L-ACNC: 2.39 UIU/ML (ref 0.47–4.68)
VIT B12 SERPL-MCNC: 504 PG/ML (ref 100–900)
WBC # BLD AUTO: 7.8 THOUSAND/UL (ref 4.5–11)

## 2019-11-15 PROCEDURE — 80053 COMPREHEN METABOLIC PANEL: CPT

## 2019-11-15 PROCEDURE — 82607 VITAMIN B-12: CPT

## 2019-11-15 PROCEDURE — 80307 DRUG TEST PRSMV CHEM ANLYZR: CPT | Performed by: PSYCHIATRY & NEUROLOGY

## 2019-11-15 PROCEDURE — 82306 VITAMIN D 25 HYDROXY: CPT

## 2019-11-15 PROCEDURE — 85025 COMPLETE CBC W/AUTO DIFF WBC: CPT

## 2019-11-15 PROCEDURE — 36415 COLL VENOUS BLD VENIPUNCTURE: CPT

## 2019-11-15 PROCEDURE — 84443 ASSAY THYROID STIM HORMONE: CPT

## 2019-11-15 PROCEDURE — 80061 LIPID PANEL: CPT

## 2019-11-15 PROCEDURE — 82746 ASSAY OF FOLIC ACID SERUM: CPT

## 2019-11-16 LAB
AMPHETAMINES UR QL SCN: NEGATIVE NG/ML
BARBITURATES UR QL SCN: NEGATIVE NG/ML
BENZODIAZ UR QL: NEGATIVE NG/ML
BZE UR QL: NEGATIVE NG/ML
CANNABINOIDS UR QL SCN: NEGATIVE NG/ML
METHADONE UR QL SCN: NEGATIVE NG/ML
OPIATES UR QL: NEGATIVE NG/ML
PCP UR QL: NEGATIVE NG/ML
PROPOXYPH UR QL SCN: NEGATIVE NG/ML

## 2020-01-09 ENCOUNTER — HOSPITAL ENCOUNTER (EMERGENCY)
Facility: HOSPITAL | Age: 65
Discharge: HOME | End: 2020-01-10
Attending: EMERGENCY MEDICINE | Admitting: EMERGENCY MEDICINE
Payer: MEDICARE

## 2020-01-09 ENCOUNTER — APPOINTMENT (EMERGENCY)
Dept: ULTRASOUND IMAGING | Facility: HOSPITAL | Age: 65
End: 2020-01-09
Payer: MEDICARE

## 2020-01-09 VITALS
HEIGHT: 61 IN | WEIGHT: 222.66 LBS | BODY MASS INDEX: 42.04 KG/M2 | SYSTOLIC BLOOD PRESSURE: 127 MMHG | DIASTOLIC BLOOD PRESSURE: 96 MMHG | RESPIRATION RATE: 20 BRPM | TEMPERATURE: 98.1 F | HEART RATE: 80 BPM | OXYGEN SATURATION: 98 %

## 2020-01-09 DIAGNOSIS — N95.0 POST-MENOPAUSAL BLEEDING: Primary | ICD-10-CM

## 2020-01-09 LAB
ALBUMIN SERPL BCP-MCNC: 3.5 G/DL (ref 3.5–5)
ALP SERPL-CCNC: 80 U/L (ref 46–116)
ALT SERPL W P-5'-P-CCNC: 29 U/L (ref 12–78)
ANION GAP SERPL CALCULATED.3IONS-SCNC: 6 MMOL/L (ref 4–13)
AST SERPL W P-5'-P-CCNC: 15 U/L (ref 5–45)
BASOPHILS # BLD AUTO: 0.1 THOUSANDS/ΜL (ref 0–0.1)
BASOPHILS NFR BLD AUTO: 1 % (ref 0–1)
BILIRUB SERPL-MCNC: 0.15 MG/DL (ref 0.2–1)
BILIRUB UR QL STRIP: NEGATIVE
BUN SERPL-MCNC: 17 MG/DL (ref 5–25)
CALCIUM SERPL-MCNC: 9.6 MG/DL (ref 8.3–10.1)
CHLORIDE SERPL-SCNC: 106 MMOL/L (ref 100–108)
CLARITY UR: CLEAR
CO2 SERPL-SCNC: 30 MMOL/L (ref 21–32)
COLOR UR: YELLOW
COLOR, POC: YELLOW
CREAT SERPL-MCNC: 0.87 MG/DL (ref 0.6–1.3)
EOSINOPHIL # BLD AUTO: 0.18 THOUSAND/ΜL (ref 0–0.61)
EOSINOPHIL NFR BLD AUTO: 2 % (ref 0–6)
ERYTHROCYTE [DISTWIDTH] IN BLOOD BY AUTOMATED COUNT: 14.5 % (ref 11.6–15.1)
GFR SERPL CREATININE-BSD FRML MDRD: 71 ML/MIN/1.73SQ M
GLUCOSE SERPL-MCNC: 128 MG/DL (ref 65–140)
GLUCOSE UR STRIP-MCNC: NEGATIVE MG/DL
HCT VFR BLD AUTO: 43 % (ref 34.8–46.1)
HGB BLD-MCNC: 13.2 G/DL (ref 11.5–15.4)
HGB UR QL STRIP.AUTO: ABNORMAL
IMM GRANULOCYTES # BLD AUTO: 0.03 THOUSAND/UL (ref 0–0.2)
IMM GRANULOCYTES NFR BLD AUTO: 0 % (ref 0–2)
KETONES UR STRIP-MCNC: NEGATIVE MG/DL
LEUKOCYTE ESTERASE UR QL STRIP: ABNORMAL
LIPASE SERPL-CCNC: 105 U/L (ref 73–393)
LYMPHOCYTES # BLD AUTO: 3.21 THOUSANDS/ΜL (ref 0.6–4.47)
LYMPHOCYTES NFR BLD AUTO: 33 % (ref 14–44)
MCH RBC QN AUTO: 26.6 PG (ref 26.8–34.3)
MCHC RBC AUTO-ENTMCNC: 30.7 G/DL (ref 31.4–37.4)
MCV RBC AUTO: 87 FL (ref 82–98)
MONOCYTES # BLD AUTO: 0.7 THOUSAND/ΜL (ref 0.17–1.22)
MONOCYTES NFR BLD AUTO: 7 % (ref 4–12)
NEUTROPHILS # BLD AUTO: 5.6 THOUSANDS/ΜL (ref 1.85–7.62)
NEUTS SEG NFR BLD AUTO: 57 % (ref 43–75)
NITRITE UR QL STRIP: NEGATIVE
NRBC BLD AUTO-RTO: 0 /100 WBCS
PH UR STRIP.AUTO: 7 [PH] (ref 4.5–8)
PLATELET # BLD AUTO: 296 THOUSANDS/UL (ref 149–390)
PMV BLD AUTO: 11.5 FL (ref 8.9–12.7)
POTASSIUM SERPL-SCNC: 3.8 MMOL/L (ref 3.5–5.3)
PROT SERPL-MCNC: 8.3 G/DL (ref 6.4–8.2)
PROT UR STRIP-MCNC: ABNORMAL MG/DL
RBC # BLD AUTO: 4.97 MILLION/UL (ref 3.81–5.12)
SODIUM SERPL-SCNC: 142 MMOL/L (ref 136–145)
SP GR UR STRIP.AUTO: 1.02 (ref 1–1.03)
UROBILINOGEN UR QL STRIP.AUTO: 1 E.U./DL
WBC # BLD AUTO: 9.82 THOUSAND/UL (ref 4.31–10.16)

## 2020-01-09 PROCEDURE — 76856 US EXAM PELVIC COMPLETE: CPT

## 2020-01-09 PROCEDURE — 36415 COLL VENOUS BLD VENIPUNCTURE: CPT | Performed by: EMERGENCY MEDICINE

## 2020-01-09 PROCEDURE — 99283 EMERGENCY DEPT VISIT LOW MDM: CPT | Performed by: EMERGENCY MEDICINE

## 2020-01-09 PROCEDURE — 80053 COMPREHEN METABOLIC PANEL: CPT | Performed by: EMERGENCY MEDICINE

## 2020-01-09 PROCEDURE — 83690 ASSAY OF LIPASE: CPT | Performed by: EMERGENCY MEDICINE

## 2020-01-09 PROCEDURE — 96360 HYDRATION IV INFUSION INIT: CPT

## 2020-01-09 PROCEDURE — 85025 COMPLETE CBC W/AUTO DIFF WBC: CPT | Performed by: EMERGENCY MEDICINE

## 2020-01-09 PROCEDURE — 81001 URINALYSIS AUTO W/SCOPE: CPT

## 2020-01-09 PROCEDURE — 99284 EMERGENCY DEPT VISIT MOD MDM: CPT

## 2020-01-09 PROCEDURE — 76830 TRANSVAGINAL US NON-OB: CPT

## 2020-01-09 RX ADMIN — SODIUM CHLORIDE 1000 ML: 0.9 INJECTION, SOLUTION INTRAVENOUS at 23:04

## 2020-01-10 LAB
BACTERIA UR QL AUTO: ABNORMAL /HPF
NON-SQ EPI CELLS URNS QL MICRO: ABNORMAL /HPF
RBC #/AREA URNS AUTO: ABNORMAL /HPF
WBC #/AREA URNS AUTO: ABNORMAL /HPF

## 2020-01-10 NOTE — ED PROVIDER NOTES
History  Chief Complaint   Patient presents with    Vaginal Bleeding     pt states she has had heavy vaginal bleeding x 5 days  +abd cramping  Pt has been menopausal x 13 years, and then had 2 days of vaginal bleeding in November also  Pt has appt 2/19/20 with OB/GYN      C/o heavy vaginal bleeding for the past week, worse today  She was changing her pads every 1/2 hour to an hour tonight  No weakness, no dizziness, no cp, no sob  No fevers, no abd  Pain  She had an endometrial biopsy done within the past few weeks with [facility] but doesn't know the results yet  None       Past Medical History:   Diagnosis Date    Diabetes mellitus ([facility] )     Hyperlipidemia     Hypertension        Past Surgical History:   Procedure Laterality Date    APPENDECTOMY      BLADDER SURGERY      KIDNEY STONE SURGERY         History reviewed  No pertinent family history  I have reviewed and agree with the history as documented  Social History     Tobacco Use    Smoking status: Never Smoker    Smokeless tobacco: Never Used   Substance Use Topics    Alcohol use: Never     Frequency: Never    Drug use: Never        Review of Systems   Constitutional: Negative for appetite change, fatigue and fever  HENT: Negative for rhinorrhea and sore throat  Respiratory: Negative for cough, shortness of breath and wheezing  Cardiovascular: Negative for chest pain and leg swelling  Gastrointestinal: Negative for abdominal pain, diarrhea and vomiting  Genitourinary: Positive for vaginal bleeding  Negative for dysuria and flank pain  Musculoskeletal: Negative for back pain and neck pain  Skin: Negative for rash  Neurological: Negative for syncope and headaches  Psychiatric/Behavioral:        Mood normal       Physical Exam  Physical Exam   Constitutional: She is oriented to person, place, and time  She appears well-developed and well-nourished  HENT:   Head: Normocephalic and atraumatic     Neck: Normal range of motion  Neck supple  Cardiovascular: Normal rate and regular rhythm  Pulmonary/Chest: Effort normal and breath sounds normal  No respiratory distress  Abdominal: Soft  There is no tenderness  Musculoskeletal: Normal range of motion  Neurological: She is alert and oriented to person, place, and time  Skin: Skin is warm and dry  Nursing note and vitals reviewed        Vital Signs  ED Triage Vitals [01/09/20 2026]   Temperature Pulse Respirations Blood Pressure SpO2   98 1 °F (36 7 °C) 95 20 (!) 191/115 98 %      Temp Source Heart Rate Source Patient Position - Orthostatic VS BP Location FiO2 (%)   Oral Monitor Sitting Left arm --      Pain Score       4           Vitals:    01/09/20 2026 01/09/20 2239   BP: (!) 191/115 127/96   Pulse: 95 80   Patient Position - Orthostatic VS: Sitting Lying         Visual Acuity      ED Medications  Medications   sodium chloride 0 9 % bolus 1,000 mL (0 mL Intravenous Stopped 1/10/20 0005)       Diagnostic Studies  Results Reviewed     Procedure Component Value Units Date/Time    Urine Microscopic [[id number]]  (Abnormal) Collected:  01/09/20 2257    Lab Status:  Final result Specimen:  Urine, Clean Catch Updated:  01/10/20 0007     RBC, UA Innumerable /hpf      WBC, UA 4-10 /hpf      Epithelial Cells Occasional /hpf      Bacteria, UA Occasional /hpf     Comprehensive metabolic panel [[id number]]  (Abnormal) Collected:  01/09/20 2304    Lab Status:  Final result Specimen:  Blood from Arm, Left Updated:  01/09/20 2343     Sodium 142 mmol/L      Potassium 3 8 mmol/L      Chloride 106 mmol/L      CO2 30 mmol/L      ANION GAP 6 mmol/L      BUN 17 mg/dL      Creatinine 0 87 mg/dL      Glucose 128 mg/dL      Calcium 9 6 mg/dL      AST 15 U/L      ALT 29 U/L      Alkaline Phosphatase 80 U/L      Total Protein 8 3 g/dL      Albumin 3 5 g/dL      Total Bilirubin 0 15 mg/dL      eGFR 71 ml/min/1 73sq m     Narrative:       [place] guidelines for Chronic Kidney Disease (CKD):     Stage 1 with normal or high GFR (GFR > 90 mL/min/1 73 square meters)    Stage 2 Mild CKD (GFR = 60-89 mL/min/1 73 square meters)    Stage 3A Moderate CKD (GFR = 45-59 mL/min/1 73 square meters)    Stage 3B Moderate CKD (GFR = 30-44 mL/min/1 73 square meters)    Stage 4 Severe CKD (GFR = 15-29 mL/min/1 73 square meters)    Stage 5 End Stage CKD (GFR <15 mL/min/1 73 square meters)  Note: GFR calculation is accurate only with a steady state creatinine    Lipase [[id number]]  (Normal) Collected:  01/09/20 2304    Lab Status:  Final result Specimen:  Blood from Arm, Left Updated:  01/09/20 2343     Lipase 105 u/L     CBC and differential [[id number]]  (Abnormal) Collected:  01/09/20 2304    Lab Status:  Final result Specimen:  Blood from Arm, Left Updated:  01/09/20 2327     WBC 9 82 Thousand/uL      RBC 4 97 Million/uL      Hemoglobin 13 2 g/dL      Hematocrit 43 0 %      MCV 87 fL      MCH 26 6 pg      MCHC 30 7 g/dL      RDW 14 5 %      MPV 11 5 fL      Platelets 073 Thousands/uL      nRBC 0 /100 WBCs      Neutrophils Relative 57 %      Immat GRANS % 0 %      Lymphocytes Relative 33 %      Monocytes Relative 7 %      Eosinophils Relative 2 %      Basophils Relative 1 %      Neutrophils Absolute 5 60 Thousands/µL      Immature Grans Absolute 0 03 Thousand/uL      Lymphocytes Absolute 3 21 Thousands/µL      Monocytes Absolute 0 70 Thousand/µL      Eosinophils Absolute 0 18 Thousand/µL      Basophils Absolute 0 10 Thousands/µL     POCT urinalysis dipstick [[id number]]  (Normal) Resulted:  01/09/20 2259    Lab Status:  Final result Specimen:  Urine Updated:  01/09/20 2259     Color, UA yellow    Urine Macroscopic, POC [[id number]]  (Abnormal) Collected:  01/09/20 2257    Lab Status:  Final result Specimen:  Urine Updated:  01/09/20 2258     Color, UA Yellow     Clarity, UA Clear     pH, UA 7 0     Leukocytes, UA Small     Nitrite, UA Negative     Protein, UA 30 (1+) mg/dl      Glucose, UA Negative mg/dl      Ketones, UA Negative mg/dl      Urobilinogen, UA 1 0 E U /dl      Bilirubin, UA Negative     Blood, UA Large     Specific [place], UA 1 020    Narrative:       CLINITEK RESULT                 US pelvis complete w transvaginal   Final Result by Nichole Benítez DO (01/09 2236)       Marked endometrial thickening in a post menopausal female with some heterogeneous material seen within the lower uterine segment, as described; probably blood  While nonspecific, in this setting, endometrial neoplasm is the diagnosis of exclusion  Tissue diagnosis recommended at the discretion of the referring caregiver  Bilateral ovaries not well seen due to overlying bowel gas  Other findings as above  Findings discussed with Dr Irene Zapata by Dr Pat Talbot at 10:35 PM on 1/9/2020  Workstation performed: [id number]                    Procedures  Procedures         ED Course                               MDM  Number of Diagnoses or Management Options  Post-menopausal bleeding:      Amount and/or Complexity of Data Reviewed  Clinical lab tests: ordered and reviewed  Tests in the radiology section of CPT®: ordered and reviewed    Risk of Complications, Morbidity, and/or Mortality  Presenting problems: moderate  General comments: I spoke to Matt Cisneros, ob-gyn resident, and went over the case with her  We have no access to her recent endometrial biopsy result or who her ob-gyn dr  Was  Our ob-gyn dr 's office will follow up with her tomorrow  She's changed her pad once in the ER here, otherwise asymptomatic, vitals stable  She is stable to follow up in the ob-gyn office tomorrow- the office will call her if she doesn't call them  She understands that there is a concern for endometrial cancer              Disposition  Final diagnoses:   Post-menopausal bleeding     Time reflects when diagnosis was documented in both MDM as applicable and the Disposition within this note Time User Action Codes Description Comment    1/9/2020 11:57 PM Barnett Pallas Add [N95 0] Post-menopausal bleeding       ED Disposition     ED Disposition Condition Date/Time Comment    Discharge Stable Thu Jan 9, 2020 11:57 PM Breezyehsan Pete discharge to home/self care  Follow-up Information     Follow up With Specialties Details Why Contact Info Additional [address] [facility] Obstetrics and Gynecology Follow up As soon as possible for appointment [address], [address] [address]  Luis Carias, [address], [address] [place], [place], [address] [facility] [address] Obstetrics and Gynecology   [address], [address] [address]  Luis Carias, [address], [address] [place] [address], [id number] [phone]          There are no discharge medications for this patient  No discharge procedures on file      ED Provider  Electronically Signed by           Yary Sams MD  01/10/20 5951

## 2020-01-10 NOTE — ED NOTES
Pt at [address] at this time     Nathan Atwood, RN  01/09/20 3439

## 2020-01-15 ENCOUNTER — OFFICE VISIT (OUTPATIENT)
Dept: OBGYN CLINIC | Facility: CLINIC | Age: 65
End: 2020-01-15

## 2020-01-15 VITALS
DIASTOLIC BLOOD PRESSURE: 80 MMHG | HEART RATE: 73 BPM | WEIGHT: 221.6 LBS | SYSTOLIC BLOOD PRESSURE: 140 MMHG | BODY MASS INDEX: 41.84 KG/M2 | HEIGHT: 61 IN

## 2020-01-15 DIAGNOSIS — R93.89 THICKENED ENDOMETRIUM: ICD-10-CM

## 2020-01-15 DIAGNOSIS — N95.0 PMB (POSTMENOPAUSAL BLEEDING): Primary | ICD-10-CM

## 2020-01-15 PROCEDURE — 58100 BIOPSY OF UTERUS LINING: CPT | Performed by: OBSTETRICS & GYNECOLOGY

## 2020-01-15 PROCEDURE — 88305 TISSUE EXAM BY PATHOLOGIST: CPT | Performed by: PATHOLOGY

## 2020-01-15 PROCEDURE — 99202 OFFICE O/P NEW SF 15 MIN: CPT | Performed by: OBSTETRICS & GYNECOLOGY

## 2020-01-15 RX ORDER — ARIPIPRAZOLE 5 MG/1
5 TABLET ORAL EVERY EVENING
COMMUNITY
End: 2020-08-05

## 2020-01-15 NOTE — PROGRESS NOTES
Endometrial biopsy  Date/Time: 1/15/2020 1:44 PM  Performed by: Rock Geoffrey MD  Authorized by: Rock Geoffrey MD     Consent:     Consent obtained:  Verbal and written    Consent given by:  Patient    Procedural risks discussed:  Bleeding and infection    Patient questions answered: yes      Patient agrees, verbalizes understanding, and wants to proceed: yes      Educational handouts given: yes      Instructions and paperwork completed: yes    Indication:     Indications: Post-menopausal bleeding      Chronicity of post-menopausal bleeding:  New    Progression of post-menopausal bleeding:  Waxing and waning  Pre-procedure:     Pre-procedure timeout performed: yes    Procedure:     Procedure: endometrial biopsy with Pip[place]      A bivalve speculum was placed in the vagina: yes      Cervix cleaned and prepped: yes      The cervix was dilated: yes      Uterus sounded: no      Specimen collected: specimen collected and sent to pathology    Findings:     Uterus size:  Non-gravid    Cervix: normal

## 2020-01-15 NOTE — PATIENT INSTRUCTIONS
Biopsia endometrial   LO QUE NECESITA SABER:   La biopsia endometrial es un procedimiento en el cual se emma lawrence muestra de tejido del revestimiento del útero  Dalia procedimiento se hace por la vagina  INSTRUCCIONES SOBRE EL SANDOR HOSPITALARIA:   Medicamentos:  A usted le pueden  prescribir los siguientes medicamentos:  · AINEs (Analgésicos antiinflamatorios no esteroides) gavi el ibuprofeno, ayudan a disminuir la inflamación, el dolor y la fiebre  Dalia medicamento esta disponible con o sin lawrence receta médica  Los AINEs pueden causar sangrado estomacal o problemas renales en ciertas personas  Si usted emma un medicamento anticoagulante, siempre pregúntele a vallecillo médico si los CECILLE son seguros para usted  Siempre alicia la etiqueta de dalia medicamento y [place] instrucciones  · [place] fiona medicamentos gavi se le haya indicado  Consulte con vallecillo médico si usted apoorva que vallecillo medicamento no le está ayudando o si presenta efectos secundarios  Infórmele si es alérgico a cualquier medicamento  Mantenga lawrence lista actualizada de los [place], las vitaminas y los productos herbales que emma  Incluya los siguientes datos de los medicamentos: cantidad, frecuencia y motivo de administración  Traiga con usted la lista o los envases de la píldoras a fiona citas de seguimiento  Lleve la lista de los medicamentos con usted en luisa de lawrence emergencia  Programe lawrence jef con vallecillo médico o ginecólogo gavi se le indique:  Es posible que necesite regresar para que le realicen más exámenes  Anote fiona preguntas para que se acuerde de hacerlas sumit fiona visitas  Cuidados personales:   · Usted podría sentir dolor leve, calambres o incluso tener manchas de [place] por unos días después de vallecillo procedimiento  · Evite las [employer], las duchas vaginales o el uso de tampones por 10 a 14 días o según indique vallecillo médico   Consulte con vallecillo médico o ginecólogo sí:   · Usted tiene fiebre      · Usted tiene dolor o calambres con lawrence duración de más de varios días  · Usted tiene secreción vaginal karolyn o amarilla  · Usted tiene más sangrado vaginal del que se le dijo que era de esperar  · Usted tiene preguntas o inquietudes acerca de hemphill condición o cuidado  Busque atención médica de inmediato o llame al 911 si:   · Usted tiene dolor severo que no desaparece después de hoang medicación para el dolor  © 2017 [facility] Information is for End User's use only and may not be sold, redistributed or otherwise used for commercial purposes  All illustrations and images included in CareNotes® are the copyrighted property of A D A M , Inc  or Brad Anne  Esta información es sólo para uso en educación  Hemphill intención no es darle un consejo médico sobre enfermedades o tratamientos  Colsulte con hemphill Stacia Duarte farmacéutico antes de seguir cualquier régimen médico para saber si es seguro y efectivo para usted

## 2020-01-15 NOTE — PROGRESS NOTES
Assessment/Plan:     No problem-specific Assessment & Plan notes found for this encounter  Diagnoses and all orders for this visit:    PMB (postmenopausal bleeding)  -     Tissue Exam    Other orders  -     ARIPiprazole (ABILIFY) 5 mg tablet; Take 5 mg by mouth daily    Discussed EMB -pt agrees to do today  RTO for results review  Will need annual in near future  Subjective:      Patient ID: Polina Duke is a 59 y o  female presents as an ED follow up for PMB  She is post-menopausal x 13 years  She has now been bleeding on and off x 3 months  She went to the ED and and US was performed (see below)  HPI    The following portions of the patient's history were reviewed and updated as appropriate: allergies, current medications, past family history, past medical history, past social history, past surgical history and problem list     Review of Systems      Objective:      /80   Pulse 73   Ht 5' 1" (1 549 m)   Wt 101 kg (221 lb 9 6 oz)   BMI 41 87 kg/m²     UTERUS:  The uterus is anteverted in position, measuring 12 9 x 5 2 x 5 4 cm  Contour and echotexture appear normal   Some heterogeneous material seen within the lower uterine segment is noted and is probably blood  This measures approximately 3 1 x 2 0 x 1 5 cm in size  There are multiple subcentimeter nabothian cysts incidentally seen      ENDOMETRIUM:    Thickened AP caliber of 25 mm       OVARIES/ADNEXA:  Bilateral ovaries not well seen due to overlying bowel gas      No discrete free fluid, suspicious adnexal mass or loculated collections         IMPRESSION:     Marked endometrial thickening in a post menopausal female with some heterogeneous material seen within the lower uterine segment, as described; probably blood  While nonspecific, in this setting, endometrial neoplasm is the diagnosis of exclusion      Tissue diagnosis recommended at the discretion of the referring caregiver      Bilateral ovaries not well seen due to overlying bowel gas      Other findings as above  Physical Exam   Constitutional: She is oriented to person, place, and time  She appears well-developed and well-nourished  No distress  Pulmonary/Chest: Effort normal    Genitourinary: Vagina normal  There is no rash, tenderness, lesion or injury on the right labia  There is no rash, tenderness, lesion or injury on the left labia  Cervix exhibits no motion tenderness, no discharge and no friability  Neurological: She is alert and oriented to person, place, and time  Psychiatric: She has a normal mood and affect  Her behavior is normal    Nursing note and vitals reviewed

## 2020-01-29 ENCOUNTER — OFFICE VISIT (OUTPATIENT)
Dept: OBGYN CLINIC | Facility: CLINIC | Age: 65
End: 2020-01-29

## 2020-01-29 VITALS
WEIGHT: 223 LBS | HEART RATE: 69 BPM | DIASTOLIC BLOOD PRESSURE: 90 MMHG | SYSTOLIC BLOOD PRESSURE: 147 MMHG | BODY MASS INDEX: 42.14 KG/M2

## 2020-01-29 DIAGNOSIS — R93.89 THICKENED ENDOMETRIUM: ICD-10-CM

## 2020-01-29 DIAGNOSIS — Z71.2 ENCOUNTER TO DISCUSS TEST RESULTS: Primary | ICD-10-CM

## 2020-01-29 PROCEDURE — 99214 OFFICE O/P EST MOD 30 MIN: CPT | Performed by: OBSTETRICS & GYNECOLOGY

## 2020-01-29 NOTE — PATIENT INSTRUCTIONS
Histeroscopía   LO QUE USTED DEBE SABER:   La histeroscopía es un procedimiento para roxane por dentro de vallecillo útero  Puede que se realicen otros procedimientos sumit la histeroscopía  ACUERDOS SOBRE VALLECILLO CUIDADO:   Usted tiene el derecho de participar en la planificación de vallecillo cuidado  Aprenda todo lo que pueda sobre vallecillo condición y gavi darle tratamiento  Discuta con fiona médicos fiona opciones de tratamiento para juntos decidir el cuidado que usted quiere recibir  Usted siempre tiene el derecho a rechazar vallecillo tratamiento  RIESGOS:   Puede que usted tenga calambres abdominales  Puede que [employer] de lo esperado o desarrolle lawrence infección  El procedimiento puede llegar a cicatrizar vallecillo útero y causarle problemas más adelante  Usted también puede tener lawrence reacción a los medicamentos usados para llenarle el útero sumit la histeroscopía  Vallecillo útero, intestino grueso o vejiga pueden llegar a sufrir daños sumit el procedimiento  Eitan, sin dalia procedimiento, puede que los médicos no encuentren la causa de fiona problemas de [place]  PREPARÁNDOSE:   La semana antes de vallecillo procedimiento:   · Anote la fecha correcta, el tiempo, y el lugar de vallecillo procedimiento  · Arregle vallecillo viaje de vuelta a casa  Pida a un miembro de héctor o a un amigo que lo lleva a vallecillo casa después de vallecillo cirugía o procedimiento  No maneje por vallecillo cuenta  · Pregúntele a vallecillo médico si usted tiene que dejar de usar la aspirina o algun otro medicamento prescribida o sin receta médica antes de vallecillo procedimiento o cirugía  · Cuando vaya a consulta con [address], lleve [address] o los envases de fiona medicamentos  Infórmele al médico si usted es alérgico a cualquier medicamento  Infórmele al médico si usted [place] productos herbales, suplementos nutricionales, o medicamentos de venta monster (sin receta médica)  · Puede que usted necesite exámenes de dominguez antes de vallecillo procedimiento   Hable con vallecillo médico sobre estos y [de-identified] exámenes que usted pueda necesitar  Anote la fecha, la hora y el lugar para cada examen  La noche antes de vallecillo procedimiento:   · Le pueden kalyan a usted un medicamento para ayudarle a dormir  · Pregúntale a annelise médicos sobre direcciones para comer y beber  El día de vallecillo procedimiento:   · Pregúntale a vallecillo médico antes de hoang cualquier medicamento sumit el día de vallecillo procedimiento  Estos medicamentos incluyen insulina, píldoras diabéticas, píldoras de hipertensión, o píldoras de corazón  Traiga lawrence lista de todas los medicamentos que usted emma, o annelise botellas de [place], con usted al hospital     · A usted o un miembro de vallecillo héctor cercano se les pedirán firmar un pedazo de documento legal conocido gavi un formulario de autorización  [place] da vallecillo permiso a los médicos para hacer el procedimiento o [place]  También Time [place] que puedrían ocurrir, y annelise opciones  Esté seguro que todos annelise preguntas hayan sido contestadas antes de que usted firme esta forma  · Los médicos pueden insertar un tubo intravenoso en vallecillo vena  Por lo general, lawrence vena en el brazo es elegida  Por el tubo [place], pueden darle líquidos y [place]  · Un anestesiólogo hablará con usted antes de vallecillo cirugía  Es posible que necesite medicamento para mantenerlo dormido o para adormecer alguna área de vallecillo cuerpo sumit la cirugía  Infórmele a los médicos si usted o alguien en vallecillo héctor ha tenido un problema con la anestesia anteriormente  TRATAMIENTO:   Lo que sucederá:  Annelise piernas serán colocadas en los estribos de la joaquina [place]  Luego se colocará un instrumento conocido gavi histeroscopio en vallecillo vagina  Procederán a llenarle el útero con dióxido de carbono o un líquido para mantenerlo abierto, lo cual permite a los médicos roxane el útero más de [place]  Puede que se usen instrumentos para remover pólipos, fibromas, un DIU o para revisar si hay otros problemas si fuera necesario   Además se puede hoang [place] del tejido para enviarla al laboratorio a realizarle pruebas  Después de hemphill procedimiento:  A usted la llevarán a lawrence habitación de recuperación para que descanse hasta que esté despierta por completo  [employer] la monitorearán muy de cerca por algún problema  Favor de  no  salirse de la cama hasta que hemphill médico lo autorice  Cuando hemphill médico mart que usted [address], usted entonces podría irse a casa o si no la llevarán a hemphill habitación en el hospital  [facility] lawrence toalla sanitaria entre las piernas  Un miembro del personal médico revisará la toalla poco tiempo después del procedimiento para roxane si hay sangrado  Es probable que usted sienta ganas de [place], desvanecimiento, mareos o náuseas después de hemphill histeroscopía  PÓNGASE EN CONTACTO CON UN MÉDICO SI:   · Usted no puede cumplir con la jef para hemphill procedimiento  · Usted tiene fiebre  · Usted se resfría o le da gripe  · Usted tiene preguntas o inquietudes sobre hemphill procedimiento  BUSQUE ATENCIÓN INMEDIATA SI:   · Annelise síntomas empeoran  © 2014 [address] is for End User's use only and may not be sold, redistributed or otherwise used for commercial purposes  All illustrations and images included in CareNotes® are the copyrighted property of A D A M , Inc  or Brad Anne  Esta información es sólo para uso en educación  Hemphill intención no es darle un consejo médico sobre enfermedades o tratamientos  Colsulte con hemphill Sena Kiera farmacéutico antes de seguir cualquier régimen médico para saber si es seguro y efectivo para usted

## 2020-01-29 NOTE — PROGRESS NOTES
H&P for surgery  Assessment/Plan:     No problem-specific Assessment & Plan notes found for this encounter  Diagnoses and all orders for this visit:    Encounter to discuss test results    Thickened endometrium    Plan on hysteroscopy, D&C  Consent was signed  Subjective:      Patient ID: Ghazala Reynoso is a 59 y o  female presents for EMB results  Results are not diagnostic  Pt denies further PMB  On US her EML was significantly thickened at 25 mm  She agrees to [facility]  for definitive diagnosis  HPI    The following portions of the patient's history were reviewed and updated as appropriate: allergies, current medications, past family history, past medical history, past social history, past surgical history and problem list     Review of Systems      Objective:      /90   Pulse 69   Wt 101 kg (223 lb)   BMI 42 14 kg/m²     Final Diagnosis   A  Endometrium (biopsy):     - Endocervical glandular mucosa admixed with mucin      - Scant unremarkable-appearing ectocervical squamous mucosa  - Rare possible lower uterine segment mucosa noted  - No definitive endometrial tissue present for evaluation          No Known Allergies      Current Outpatient Medications:     albuterol (PROVENTIL HFA,VENTOLIN HFA) 90 mcg/act inhaler, Inhale 2 puffs every 6 (six) hours as needed for wheezing, Disp: , Rfl:     ARIPiprazole (ABILIFY) 5 mg tablet, Take 5 mg by mouth daily, Disp: , Rfl:     atorvastatin (LIPITOR) 20 mg tablet, Take 20 mg by mouth daily, Disp: , Rfl:     lisinopril (ZESTRIL) 20 mg tablet, Take 20 mg by mouth daily, Disp: , Rfl:     lisinopril (ZESTRIL) 20 mg tablet, Take 1 tablet (20 mg total) by mouth daily, Disp: 20 tablet, Rfl: 0    metFORMIN (GLUCOPHAGE) 500 mg tablet, Take 500 mg by mouth 2 (two) times a day with meals, Disp: , Rfl:     Past Medical History:   Diagnosis Date    Asthma     Depression     [facility] ([facility] [address] [facility] )     Hyperlipidemia  Hypertension     Kidney stone      Past Surgical History:   Procedure Laterality Date    APPENDECTOMY      CHOLECYSTECTOMY      KIDNEY STONE SURGERY      TUBAL LIGATION       Pt is a non-smoker  Physical Exam   Constitutional: She is oriented to person, place, and time  She appears well-developed and well-nourished  No distress  Cardiovascular: Normal rate and regular rhythm  Pulmonary/Chest: Effort normal and breath sounds normal    Neurological: She is alert and oriented to person, place, and time  Psychiatric: She has a normal mood and affect  Her behavior is normal    Nursing note and vitals reviewed

## 2020-01-30 PROBLEM — R93.89 THICKENED ENDOMETRIUM: Status: ACTIVE | Noted: 2020-01-30

## 2020-02-26 PROBLEM — I10 ESSENTIAL HYPERTENSION: Chronic | Status: ACTIVE | Noted: 2020-02-26

## 2020-02-26 NOTE — PROGRESS NOTES
Assessment/Plan:    Essential hypertension  BP goal is < 130/80  - Current BP is 140/90  - Patient currently takes lisinopril 20 mg, Metoprolol 25 mg  - Microalbumin / creatine ratio  - Will Add HCTZ 25 mg    Hyperlipidemia  Will check lipid profile   - Increase atorvastatin from 20 mg to 40 mg    Colon cancer screening  Patient never had a colonoscopy  - will send to GI     Thickened endometrium  Patient is following with GYN    Di[facility] mellitus ([facility] )    No results found for: HGBA1C   Will get in office HBa1c ( 6 6)    Currently on 500 mg of metformin bid, will continue   - On a statin and an ACE  - Will refer to podiatry and ophthalmology     Class 3 severe obesity due to excess calories with serious comorbidity and body mass index (BMI) of 40 0 to 44 9 in [facility])  Will send patient to nutrition services and weight management     Depression  Patient denies Suicidal and / or Homicidal ideations  - Emergency room precautions given  - Continue to F/U with psychiatry        Diagnoses and all orders for this visit:    Essential hypertension  -     hydrochlorothiazide (HYDRODIURIL) 25 mg tablet; Take 1 tablet (25 mg total) by mouth daily  -     CBC (Includes Diff/Plt) (Refl); Future  -     Comprehensive metabolic panel; Future  -     Microalbumin / creatinine urine ratio    Colon cancer screening  -     Ambulatory referral to Gastroenterology; Future    Hyperlipidemia, unspecified hyperlipidemia type  -     atorvastatin (LIPITOR) 40 mg tablet; Take 1 tablet (40 mg total) by mouth daily  -     Lipid panel; Future    Thickened endometrium    Encounter for screening for HIV  -     HIV 1/2 AG-AB combo; Future    Encounter for hepatitis C screening test for low risk patient  -     Hepatitis C antibody; Future    Type 2 diabetes mellitus without complication, without long-term current use of insulin ([facility] )  -     Ambulatory referral to Ophthalmology; Future  -     Ambulatory referral to Podiatry;  Future  - metFORMIN (GLUCOPHAGE) 500 mg tablet; Take 1 tablet (500 mg total) by mouth 2 (two) times a day with meals    Class 3 severe obesity due to excess calories with serious comorbidity and body mass index (BMI) of 40 0 to 44 9 in adult [facility])    Morbid obesity with BMI of 40 0-44 9, adult ([facility] )  -     Ambulatory referral to Nutrition Services; Future  -     Ambulatory referral to Weight Management; Future    Encounter for administration of vaccine  -     influenza vaccine, [id number], quadrivalent, recombinant, PF, 0 5 mL, for patients 18 yr+ (FLUBLOK)    Mild intermittent asthma, unspecified whether complicated  -     albuterol (PROVENTIL HFA,VENTOLIN HFA) 90 mcg/act inhaler; Inhale 2 puffs every 6 (six) hours as needed for wheezing or shortness of breath    Other orders  -     metoprolol tartrate (LOPRESSOR) 25 mg tablet; Take 25 mg by mouth daily   -     Cholecalciferol (VITAMIN D3) 25 MCG (1000 UT) CAPS; Take by mouth  -     lamoTRIgine (LaMICtal) 100 mg tablet; Take 100 mg by mouth daily at bedtime  -     buPROPion (WELLBUTRIN) 75 mg tablet; Take 75 mg by mouth every morning   -     topiramate (TOPAMAX) 50 MG tablet; Take 50 mg by mouth daily at bedtime  -     omeprazole (PriLOSEC) 20 mg delayed release capsule; Take 20 mg by mouth daily          Subjective:      Patient ID: Jt Godwin is a 59 y o  female  Marribenoit Sanchez is a very pleasant 57-year-old female who presents to clinic to establish care  Patient has a known medical history of asthma, depression, diabetes mellitus, hyperlipidemia, hypertension, renal stones  Known surgical history include appendectomy, cholecystectomy, kidney stone surgery, tubal ligation       Patient would like the flu shot today      The following portions of the patient's history were reviewed and updated as appropriate: allergies, current medications, past family history, past medical history, past social history, past surgical history and problem list     Review of Systems Constitutional: Negative for activity change, appetite change, fatigue and fever  HENT: Negative for congestion, sore throat and trouble swallowing  Eyes: Negative for pain, discharge and visual disturbance  Respiratory: Negative for apnea, chest tightness and wheezing  Cardiovascular: Negative for chest pain, palpitations and leg swelling  Gastrointestinal: Negative for abdominal distention, abdominal pain, blood in stool, constipation and nausea  Endocrine: Negative for cold intolerance and heat intolerance  Genitourinary: Negative for difficulty urinating, dysuria, frequency, hematuria, pelvic pain and urgency  Musculoskeletal: Negative for arthralgias, back pain and gait problem  Skin: Negative for color change, rash and wound  Allergic/Immunologic: Negative for environmental allergies, food allergies and immunocompromised state  Neurological: Negative for dizziness, tremors, syncope and headaches  Hematological: Negative for adenopathy  Psychiatric/Behavioral: Negative for agitation and behavioral problems  Objective:      /94 (BP Location: Left arm, Patient Position: Sitting, Cuff Size: Large)   Pulse 97   Temp 98 °F (36 7 °C) (Temporal)   Resp 16   Ht 5' 1" (1 549 m)   Wt 101 kg (222 lb)   SpO2 97%   BMI 41 95 kg/m²          Physical Exam   Constitutional: She is oriented to person, place, and time  She appears well-developed and well-nourished  No distress  HENT:   Head: Normocephalic and atraumatic  Right Ear: External ear normal    Left Ear: External ear normal    Nose: Nose normal    Mouth/Throat: Oropharynx is clear and moist    Eyes: Pupils are equal, round, and reactive to light  Conjunctivae and EOM are normal  Right eye exhibits no discharge  Left eye exhibits no discharge  Neck: Normal range of motion  Neck supple  No JVD present  No tracheal deviation present  No thyromegaly present     Cardiovascular: Normal rate, regular rhythm, normal heart sounds and intact distal pulses  Exam reveals no gallop and no friction rub  No murmur heard  Pulmonary/Chest: Effort normal and breath sounds normal  No respiratory distress  She has no wheezes  She exhibits no tenderness  Abdominal: Soft  Bowel sounds are normal  She exhibits no distension  There is no tenderness  There is no rebound  Musculoskeletal: Normal range of motion  She exhibits no edema or tenderness  Neurological: She is alert and oriented to person, place, and time  She has normal reflexes  Coordination normal    Skin: Skin is warm and dry  No rash noted  She is not diaphoretic  No erythema  Psychiatric: She has a normal mood and affect  Her behavior is normal  Thought content normal        BMI Counseling: Body mass index is 41 95 kg/m²  The BMI is above normal  Nutrition recommendations include 3-5 servings of fruits/vegetables daily and consuming healthier snacks

## 2020-02-27 ENCOUNTER — TELEPHONE (OUTPATIENT)
Dept: FAMILY MEDICINE CLINIC | Facility: CLINIC | Age: 65
End: 2020-02-27

## 2020-02-27 ENCOUNTER — OFFICE VISIT (OUTPATIENT)
Dept: FAMILY MEDICINE CLINIC | Facility: CLINIC | Age: 65
End: 2020-02-27

## 2020-02-27 VITALS
OXYGEN SATURATION: 97 % | BODY MASS INDEX: 41.91 KG/M2 | HEIGHT: 61 IN | WEIGHT: 222 LBS | RESPIRATION RATE: 16 BRPM | SYSTOLIC BLOOD PRESSURE: 140 MMHG | TEMPERATURE: 98 F | HEART RATE: 97 BPM | DIASTOLIC BLOOD PRESSURE: 94 MMHG

## 2020-02-27 DIAGNOSIS — I10 ESSENTIAL HYPERTENSION: Primary | Chronic | ICD-10-CM

## 2020-02-27 DIAGNOSIS — R93.89 THICKENED ENDOMETRIUM: ICD-10-CM

## 2020-02-27 DIAGNOSIS — E66.01 CLASS 3 SEVERE OBESITY DUE TO EXCESS CALORIES WITH SERIOUS COMORBIDITY AND BODY MASS INDEX (BMI) OF 40.0 TO 44.9 IN ADULT (HCC): ICD-10-CM

## 2020-02-27 DIAGNOSIS — Z12.11 COLON CANCER SCREENING: ICD-10-CM

## 2020-02-27 DIAGNOSIS — Z11.4 ENCOUNTER FOR SCREENING FOR HIV: ICD-10-CM

## 2020-02-27 DIAGNOSIS — Z23 NEED FOR VACCINATION: ICD-10-CM

## 2020-02-27 DIAGNOSIS — E78.5 HYPERLIPIDEMIA, UNSPECIFIED HYPERLIPIDEMIA TYPE: ICD-10-CM

## 2020-02-27 DIAGNOSIS — Z23 ENCOUNTER FOR ADMINISTRATION OF VACCINE: ICD-10-CM

## 2020-02-27 DIAGNOSIS — E66.01 MORBID OBESITY WITH BMI OF 40.0-44.9, ADULT (HCC): ICD-10-CM

## 2020-02-27 DIAGNOSIS — J45.20 MILD INTERMITTENT ASTHMA, UNSPECIFIED WHETHER COMPLICATED: ICD-10-CM

## 2020-02-27 DIAGNOSIS — E11.9 TYPE 2 DIABETES MELLITUS WITHOUT COMPLICATION, WITHOUT LONG-TERM CURRENT USE OF INSULIN (HCC): Primary | ICD-10-CM

## 2020-02-27 DIAGNOSIS — E11.9 TYPE 2 DIABETES MELLITUS WITHOUT COMPLICATION, WITHOUT LONG-TERM CURRENT USE OF INSULIN (HCC): ICD-10-CM

## 2020-02-27 DIAGNOSIS — Z11.59 ENCOUNTER FOR HEPATITIS C SCREENING TEST FOR LOW RISK PATIENT: ICD-10-CM

## 2020-02-27 PROBLEM — F32.A DEPRESSION: Status: ACTIVE | Noted: 2020-02-27

## 2020-02-27 LAB — SL AMB POCT HEMOGLOBIN AIC: 6.6 (ref ?–6.5)

## 2020-02-27 PROCEDURE — 1036F TOBACCO NON-USER: CPT | Performed by: INTERNAL MEDICINE

## 2020-02-27 PROCEDURE — G0008 ADMIN INFLUENZA VIRUS VAC: HCPCS | Performed by: INTERNAL MEDICINE

## 2020-02-27 PROCEDURE — 3080F DIAST BP >= 90 MM HG: CPT | Performed by: INTERNAL MEDICINE

## 2020-02-27 PROCEDURE — 90682 RIV4 VACC RECOMBINANT DNA IM: CPT | Performed by: INTERNAL MEDICINE

## 2020-02-27 PROCEDURE — 83036 HEMOGLOBIN GLYCOSYLATED A1C: CPT | Performed by: INTERNAL MEDICINE

## 2020-02-27 PROCEDURE — 3008F BODY MASS INDEX DOCD: CPT | Performed by: INTERNAL MEDICINE

## 2020-02-27 PROCEDURE — 3044F HG A1C LEVEL LT 7.0%: CPT | Performed by: INTERNAL MEDICINE

## 2020-02-27 PROCEDURE — 99203 OFFICE O/P NEW LOW 30 MIN: CPT | Performed by: INTERNAL MEDICINE

## 2020-02-27 PROCEDURE — 3077F SYST BP >= 140 MM HG: CPT | Performed by: INTERNAL MEDICINE

## 2020-02-27 RX ORDER — TOPIRAMATE 50 MG/1
50 TABLET, FILM COATED ORAL
COMMUNITY
End: 2020-08-05

## 2020-02-27 RX ORDER — BIOTIN 1 MG
1000 TABLET ORAL DAILY
COMMUNITY

## 2020-02-27 RX ORDER — ATORVASTATIN CALCIUM 40 MG/1
40 TABLET, FILM COATED ORAL DAILY
Qty: 30 TABLET | Refills: 3 | Status: SHIPPED | OUTPATIENT
Start: 2020-02-27 | End: 2020-06-05

## 2020-02-27 RX ORDER — ALBUTEROL SULFATE 90 UG/1
2 AEROSOL, METERED RESPIRATORY (INHALATION) EVERY 6 HOURS PRN
Qty: 1 INHALER | Refills: 0 | Status: SHIPPED | OUTPATIENT
Start: 2020-02-27 | End: 2020-03-26 | Stop reason: SDUPTHER

## 2020-02-27 RX ORDER — OMEPRAZOLE 20 MG/1
20 CAPSULE, DELAYED RELEASE ORAL EVERY EVENING
COMMUNITY
End: 2020-06-29 | Stop reason: SDUPTHER

## 2020-02-27 RX ORDER — LANCETS 28 GAUGE
EACH MISCELLANEOUS
Qty: 100 EACH | Refills: 2 | Status: ON HOLD | OUTPATIENT
Start: 2020-02-27 | End: 2020-05-27 | Stop reason: SDUPTHER

## 2020-02-27 RX ORDER — BLOOD-GLUCOSE METER
1 KIT MISCELLANEOUS 3 TIMES DAILY
Qty: 1 EACH | Refills: 0 | Status: SHIPPED | OUTPATIENT
Start: 2020-02-27 | End: 2020-09-28 | Stop reason: ALTCHOICE

## 2020-02-27 RX ORDER — LAMOTRIGINE 100 MG/1
100 TABLET ORAL
COMMUNITY
End: 2020-08-05

## 2020-02-27 RX ORDER — BUPROPION HYDROCHLORIDE 75 MG/1
75 TABLET ORAL EVERY MORNING
COMMUNITY
End: 2020-08-05

## 2020-02-27 RX ORDER — HYDROCHLOROTHIAZIDE 25 MG/1
25 TABLET ORAL DAILY
Qty: 30 TABLET | Refills: 3 | Status: SHIPPED | OUTPATIENT
Start: 2020-02-27 | End: 2020-06-05

## 2020-02-27 NOTE — PATIENT INSTRUCTIONS
Obesity   AMBULATORY CARE:   Obesity  is when your body mass index (BMI) is greater than 30  Your healthcare provider will use your height and weight to measure your BMI  The risks of obesity include  many health problems, such as injuries or physical disability  You may need tests to check for the following:  · Diabetes     · High blood pressure or high cholesterol     · Heart disease     · Gallbladder or liver disease     · Cancer of the colon, breast, prostate, liver, or kidney     · Sleep apnea     · Arthritis or gout  Seek care immediately if:   · You have a severe headache, confusion, or difficulty speaking  · You have weakness on one side of your body  · You have chest pain, sweating, or shortness of breath  Contact your healthcare provider if:   · You have symptoms of gallbladder or liver disease, such as pain in your upper abdomen  · You have knee or hip pain and discomfort while walking  · You have symptoms of diabetes, such as intense hunger and thirst, and frequent urination  · You have symptoms of sleep apnea, such as snoring or daytime sleepiness  · You have questions or concerns about your condition or care  Treatment for obesity  focuses on helping you lose weight to improve your health  Even a small decrease in BMI can reduce the risk for many health problems  Your healthcare provider will help you set a weight-loss goal   · Lifestyle changes  are the first step in treating obesity  These include making healthy food choices and getting regular physical activity  Your healthcare provider may suggest a weight-loss program that involves coaching, education, and therapy  · Medicine  may help you lose weight when it is used with a healthy diet and physical activity  · Surgery  can help you lose weight if you are very obese and have other health problems  There are several types of weight-loss surgery  Ask your healthcare provider for more information    Be successful losing weight:   · Set small, realistic goals  An example of a small goal is to walk for 20 minutes 5 days a week  Anther goal is to lose 5% of your body weight  · Tell friends, family members, and coworkers about your goals  and ask for their support  Ask a friend to lose weight with you, or join a weight-loss support group  · Identify foods or triggers that may cause you to overeat , and find ways to avoid them  Remove tempting high-calorie foods from your home and workplace  Place a bowl of fresh fruit on your kitchen counter  If stress causes you to eat, then find other ways to cope with stress  · Keep a diary to track what you eat and drink  Also write down how many minutes of physical activity you do each day  Weigh yourself once a week and record it in your diary  Eating changes: You will need to eat 500 to 1,000 fewer calories each day than you currently eat to lose 1 to 2 pounds a week  The following changes will help you cut calories:  · Eat smaller portions  Use small plates, no larger than 9 inches in diameter  Fill your plate half full of fruits and vegetables  Measure your food using measuring cups until you know what a serving size looks like  · Eat 3 meals and 1 or 2 snacks each day  Plan your meals in advance  [employer] and eat at home most of the time  Eat slowly  · Eat fruits and vegetables at every meal   They are low in calories and high in fiber, which makes you feel full  Do not add butter, margarine, or cream sauce to vegetables  Use herbs to season steamed vegetables  · Eat less fat and fewer fried foods  Eat more baked or grilled chicken and fish  These protein sources are lower in calories and fat than red meat  Limit fast food  Dress your salads with olive oil and vinegar instead of bottled dressing  · Limit the amount of sugar you eat  Do not drink sugary beverages  Limit alcohol  Activity changes:  Physical activity is good for your body in many ways   It helps you burn calories and build strong muscles  It decreases stress and depression, and improves your mood  It can also help you sleep better  Talk to your healthcare provider before you begin an exercise program   · Exercise for at least 30 minutes 5 days a week  Start slowly  Set aside time each day for physical activity that you enjoy and that is convenient for you  It is best to do both weight training and an activity that increases your heart rate, such as walking, bicycling, or swimming  · Find ways to be more active  Do yard work and housecleaning  Walk up the stairs instead of using elevators  Spend your leisure time going to events that require walking, such as outdoor festivals or fairs  This extra physical activity can help you lose weight and keep it off  Follow up with your healthcare provider as directed: You may need to meet with a dietitian  Write down your questions so you remember to ask them during your visits  © 2017 [address] [employer] [employer] Information is for End User's use only and may not be sold, redistributed or otherwise used for commercial purposes  All illustrations and images included in CareNotes® are the copyrighted property of [employer]  or [employer]  The above information is an  only  It is not intended as medical advice for individual conditions or treatments  Talk to your doctor, nurse or pharmacist before following any medical regimen to see if it is safe and effective for you  Weight Management   AMBULATORY CARE:   Why it is important to manage your weight:  Being overweight increases your risk of health conditions such as heart disease, high blood pressure, type 2 diabetes, and certain types of cancer  It can also increase your risk for osteoarthritis, sleep apnea, and other respiratory problems  Aim for a slow, steady weight loss  Even a small amount of weight loss can lower your risk of health problems    How to lose weight safely:  A safe and healthy way to lose weight is to eat fewer calories and get regular exercise  You can lose up about 1 pound a week by decreasing the number of calories you eat by 500 calories each day  You can decrease calories by eating smaller portion sizes or by cutting out high-calorie foods  Read labels to find out how many calories are in the foods you eat  You can also burn calories with exercise such as walking, swimming, or biking  You will be more likely to keep weight off if you make these changes part of your lifestyle  Healthy meal plan for weight management:  A healthy meal plan includes a variety of foods, contains fewer calories, and helps you stay healthy  A healthy meal plan includes the following:  · Eat whole-grain foods more often  A healthy meal plan should contain fiber  Fiber is the part of grains, fruits, and vegetables that is not broken down by your body  Whole-grain foods are healthy and provide extra fiber in your diet  Some examples of whole-grain foods are whole-wheat breads and pastas, oatmeal, brown rice, and bulgur  · Eat a variety of vegetables every day  Include dark, leafy greens such as spinach, kale, srinivas greens, and mustard greens  Eat yellow and orange vegetables such as carrots, sweet potatoes, and winter squash  · Eat a variety of fruits every day  Choose fresh or canned fruit (canned in its own juice or light syrup) instead of juice  Fruit juice has very little or no fiber  · Eat low-fat dairy foods  Drink fat-free (skim) milk or 1% milk  Eat fat-free yogurt and low-fat cottage cheese  Try low-fat cheeses such as mozzarella and other reduced-fat cheeses  · Choose meat and other protein foods that are low in fat  Choose beans or other legumes such as split peas or lentils  Choose fish, skinless poultry (chicken or turkey), or lean cuts of red meat (beef or pork)  Before you cook meat or poultry, cut off any visible fat  · Use less fat and oil  Try baking foods instead of frying them  Add less fat, such as margarine, sour cream, regular salad dressing and mayonnaise to foods  Eat fewer high-fat foods  Some examples of high-fat foods include french fries, doughnuts, ice cream, and cakes  · Eat fewer sweets  Limit foods and drinks that are high in sugar  This includes candy, cookies, regular soda, and sweetened drinks  Ways to decrease calories:   · Eat smaller portions  ¨ Use a small plate with smaller servings  ¨ Do not eat second helpings  ¨ When you eat at a restaurant, ask for a box and place half of your meal in the box before you eat  ¨ Share an entrée with someone else  · Replace high-calorie snacks with healthy, low-calorie snacks  ¨ Choose fresh fruit, vegetables, fat-free rice cakes, or air-popped popcorn instead of potato chips, nuts, or chocolate  ¨ Choose water or calorie-free drinks instead of soda or sweetened drinks  · Eat regular meals  Skipping meals can lead to overeating later in the day  Eat a healthy snack in place of a meal if you do not have time to eat a regular meal      · Do not shop for groceries when you are hungry  You may be more likely to make unhealthy food choices  Take a grocery list of healthy foods and shop after you have eaten  Exercise:  Exercise at least 30 minutes per day on most days of the week  Some examples of exercise include walking, biking, dancing, and swimming  You can also fit in more physical activity by taking the stairs instead of the elevator or parking farther away from stores  Ask your healthcare provider about the best exercise plan for you  Other things to consider as you try to lose weight:   · Be aware of situations that may give you the urge to overeat, such as eating while watching television  Find ways to avoid these situations  For example, read a book, go for a walk, or do crafts      · Meet with a weight loss support group or friends who are also trying to lose weight  This may help you stay motivated to continue working on your weight loss goals  © 2017 [address] [employer] Information is for End User's use only and may not be sold, redistributed or otherwise used for commercial purposes  All illustrations and images included in CareNotes® are the copyrighted property of A [employer]  or [employer]  The above information is an  only  It is not intended as medical advice for individual conditions or treatments  Talk to your doctor, nurse or pharmacist before following any medical regimen to see if it is safe and effective for you

## 2020-02-27 NOTE — TELEPHONE ENCOUNTER
Pt given all info for apt and mailed    Weight  mangment apt([phone]) is on 4/16/20 at 11 am at [address] [facility], [place] [address], [facility]

## 2020-02-27 NOTE — ASSESSMENT & PLAN NOTE
Patient denies Suicidal and / or Homicidal ideations  - Emergency room precautions given     - Continue to F/U with psychiatry

## 2020-02-27 NOTE — ASSESSMENT & PLAN NOTE
BP goal is < 130/80  - Current BP is 140/90  - Patient currently takes lisinopril 20 mg, Metoprolol 25 mg  - Microalbumin / creatine ratio  - Will Add HCTZ 25 mg

## 2020-02-27 NOTE — ASSESSMENT & PLAN NOTE
No results found for: HGBA1C   Will get in office HBa1c ( 6 6)    Currently on 500 mg of metformin bid, will continue   - On a statin and an ACE  - Will refer to podiatry and ophthalmology

## 2020-03-02 ENCOUNTER — TRANSCRIBE ORDERS (OUTPATIENT)
Dept: ADMINISTRATIVE | Facility: HOSPITAL | Age: 65
End: 2020-03-02

## 2020-03-02 ENCOUNTER — APPOINTMENT (OUTPATIENT)
Dept: LAB | Facility: HOSPITAL | Age: 65
End: 2020-03-02
Payer: COMMERCIAL

## 2020-03-02 DIAGNOSIS — E78.5 HYPERLIPIDEMIA, UNSPECIFIED HYPERLIPIDEMIA TYPE: ICD-10-CM

## 2020-03-02 DIAGNOSIS — I10 ESSENTIAL HYPERTENSION: Chronic | ICD-10-CM

## 2020-03-02 DIAGNOSIS — Z11.4 ENCOUNTER FOR SCREENING FOR HIV: ICD-10-CM

## 2020-03-02 DIAGNOSIS — R93.89 THICKENED ENDOMETRIUM: ICD-10-CM

## 2020-03-02 DIAGNOSIS — N95.0 POSTMENOPAUSAL BLEEDING: ICD-10-CM

## 2020-03-02 DIAGNOSIS — Z11.59 ENCOUNTER FOR HEPATITIS C SCREENING TEST FOR LOW RISK PATIENT: ICD-10-CM

## 2020-03-02 LAB
ALBUMIN SERPL BCP-MCNC: 4.1 G/DL (ref 3–5.2)
ALP SERPL-CCNC: 72 U/L (ref 43–122)
ALT SERPL W P-5'-P-CCNC: 40 U/L (ref 9–52)
ANION GAP SERPL CALCULATED.3IONS-SCNC: 11 MMOL/L (ref 5–14)
AST SERPL W P-5'-P-CCNC: 32 U/L (ref 14–36)
BASOPHILS # BLD AUTO: 0.1 THOUSANDS/ΜL (ref 0–0.1)
BASOPHILS NFR BLD AUTO: 1 % (ref 0–1)
BILIRUB SERPL-MCNC: 0.5 MG/DL
BUN SERPL-MCNC: 17 MG/DL (ref 5–25)
CALCIUM SERPL-MCNC: 9.9 MG/DL (ref 8.4–10.2)
CHLORIDE SERPL-SCNC: 100 MMOL/L (ref 97–108)
CHOLEST SERPL-MCNC: 150 MG/DL
CO2 SERPL-SCNC: 29 MMOL/L (ref 22–30)
CREAT SERPL-MCNC: 0.77 MG/DL (ref 0.6–1.2)
CREAT UR-MCNC: 248 MG/DL
EOSINOPHIL # BLD AUTO: 0.2 THOUSAND/ΜL (ref 0–0.4)
EOSINOPHIL NFR BLD AUTO: 4 % (ref 0–6)
ERYTHROCYTE [DISTWIDTH] IN BLOOD BY AUTOMATED COUNT: 14.4 %
EST. AVERAGE GLUCOSE BLD GHB EST-MCNC: 143 MG/DL
GFR SERPL CREATININE-BSD FRML MDRD: 82 ML/MIN/1.73SQ M
GLUCOSE P FAST SERPL-MCNC: 141 MG/DL (ref 70–99)
HBA1C MFR BLD: 6.6 %
HCT VFR BLD AUTO: 41.3 % (ref 36–46)
HCV AB SER QL: NORMAL
HDLC SERPL-MCNC: 45 MG/DL
HGB BLD-MCNC: 13.2 G/DL (ref 12–16)
LDLC SERPL CALC-MCNC: 92 MG/DL
LYMPHOCYTES # BLD AUTO: 2.7 THOUSANDS/ΜL (ref 0.5–4)
LYMPHOCYTES NFR BLD AUTO: 43 % (ref 25–45)
MCH RBC QN AUTO: 25.8 PG (ref 26–34)
MCHC RBC AUTO-ENTMCNC: 32 G/DL (ref 31–36)
MCV RBC AUTO: 81 FL (ref 80–100)
MICROALBUMIN UR-MCNC: 839 MG/L (ref 0–20)
MICROALBUMIN/CREAT 24H UR: 338 MG/G CREATININE (ref 0–30)
MONOCYTES # BLD AUTO: 0.6 THOUSAND/ΜL (ref 0.2–0.9)
MONOCYTES NFR BLD AUTO: 10 % (ref 1–10)
NEUTROPHILS # BLD AUTO: 2.8 THOUSANDS/ΜL (ref 1.8–7.8)
NEUTS SEG NFR BLD AUTO: 43 % (ref 45–65)
NONHDLC SERPL-MCNC: 105 MG/DL
PLATELET # BLD AUTO: 283 THOUSANDS/UL (ref 150–450)
PMV BLD AUTO: 9.9 FL (ref 8.9–12.7)
POTASSIUM SERPL-SCNC: 3.6 MMOL/L (ref 3.6–5)
PROT SERPL-MCNC: 8.1 G/DL (ref 5.9–8.4)
RBC # BLD AUTO: 5.12 MILLION/UL (ref 4–5.2)
SODIUM SERPL-SCNC: 140 MMOL/L (ref 137–147)
TRIGL SERPL-MCNC: 63 MG/DL
WBC # BLD AUTO: 6.4 THOUSAND/UL (ref 4.5–11)

## 2020-03-02 PROCEDURE — 80061 LIPID PANEL: CPT

## 2020-03-02 PROCEDURE — 80053 COMPREHEN METABOLIC PANEL: CPT

## 2020-03-02 PROCEDURE — 83036 HEMOGLOBIN GLYCOSYLATED A1C: CPT

## 2020-03-02 PROCEDURE — 85025 COMPLETE CBC W/AUTO DIFF WBC: CPT

## 2020-03-02 PROCEDURE — 86803 HEPATITIS C AB TEST: CPT

## 2020-03-02 PROCEDURE — 87389 HIV-1 AG W/HIV-1&-2 AB AG IA: CPT

## 2020-03-02 PROCEDURE — 36415 COLL VENOUS BLD VENIPUNCTURE: CPT

## 2020-03-02 PROCEDURE — 82043 UR ALBUMIN QUANTITATIVE: CPT | Performed by: FAMILY MEDICINE

## 2020-03-02 PROCEDURE — 82570 ASSAY OF URINE CREATININE: CPT | Performed by: FAMILY MEDICINE

## 2020-03-03 LAB — HIV 1+2 AB+HIV1 P24 AG SERPL QL IA: NORMAL

## 2020-03-05 DIAGNOSIS — I10 HYPERTENSION, UNSPECIFIED TYPE: ICD-10-CM

## 2020-03-05 RX ORDER — LISINOPRIL 20 MG/1
20 TABLET ORAL DAILY
Qty: 20 TABLET | Refills: 0 | Status: ON HOLD | OUTPATIENT
Start: 2020-03-05 | End: 2020-07-10 | Stop reason: SDUPTHER

## 2020-03-05 RX ORDER — LISINOPRIL 20 MG/1
20 TABLET ORAL DAILY
Qty: 20 TABLET | Refills: 0 | Status: SHIPPED | OUTPATIENT
Start: 2020-03-05 | End: 2020-03-05 | Stop reason: SDUPTHER

## 2020-03-10 ENCOUNTER — OFFICE VISIT (OUTPATIENT)
Dept: OBGYN CLINIC | Facility: CLINIC | Age: 65
End: 2020-03-10

## 2020-03-10 VITALS
BODY MASS INDEX: 43 KG/M2 | HEIGHT: 60 IN | WEIGHT: 219 LBS | DIASTOLIC BLOOD PRESSURE: 90 MMHG | SYSTOLIC BLOOD PRESSURE: 170 MMHG

## 2020-03-10 DIAGNOSIS — Z01.818 PREOPERATIVE EXAMINATION: Primary | ICD-10-CM

## 2020-03-10 PROCEDURE — 3008F BODY MASS INDEX DOCD: CPT | Performed by: OBSTETRICS & GYNECOLOGY

## 2020-03-10 PROCEDURE — 3080F DIAST BP >= 90 MM HG: CPT | Performed by: OBSTETRICS & GYNECOLOGY

## 2020-03-10 PROCEDURE — 1036F TOBACCO NON-USER: CPT | Performed by: OBSTETRICS & GYNECOLOGY

## 2020-03-10 PROCEDURE — 3044F HG A1C LEVEL LT 7.0%: CPT | Performed by: OBSTETRICS & GYNECOLOGY

## 2020-03-10 PROCEDURE — 99213 OFFICE O/P EST LOW 20 MIN: CPT | Performed by: OBSTETRICS & GYNECOLOGY

## 2020-03-10 PROCEDURE — 3060F POS MICROALBUMINURIA REV: CPT | Performed by: OBSTETRICS & GYNECOLOGY

## 2020-03-10 PROCEDURE — 3077F SYST BP >= 140 MM HG: CPT | Performed by: OBSTETRICS & GYNECOLOGY

## 2020-03-10 NOTE — ASSESSMENT & PLAN NOTE
A  Endometrium (biopsy):     - Endocervical glandular mucosa admixed with mucin      - Scant unremarkable-appearing ectocervical squamous mucosa  - Rare possible lower uterine segment mucosa noted  - No definitive endometrial tissue present for evaluation  Need for definitive tissue diagnosis  Scheduled for hysteroscopy, dilation and curettage on 3/25/2020 with Dr Sanju Coleman  The risks, benefits, alternatives, and indications of the procedure were discussed with the patient, including but not limited to: bleeding, infection, injury to the adjacent organs such as bladder, bowel  blood vesels, nerves, and ureters  She understands that if another organ is injured, it might require another procedure to repair it and an injury may not be recognized on the date of her surgery  We discussed that other problems such as the formation of blood clots, bowel obstruction, abscess, hematoma, lymphedema, or a fistula (abnormal connection between two organs) could develop later after surgery  We discussed the risk of bowel resection and/or colostomy  We discussed that unforeseen events can occur during or after surgery such as anesthesia complications, heart attack, stroke or excessive blood loss that could lead to permanent disability or death  We reviewed risks of possible blood tranfusions, including allergic reaction, chest pain, shortness of breath, and infectious risk (HIV/hepatitis)  Alternatives to recommended procedure were also discussed along with their risks and benefits  She was given an opportunity to ask questions which were answered to her satisfaction  She understood the information presented to her and wished to proceed with the recommended procedure  Written informed consent was obtained

## 2020-03-10 NOTE — PROGRESS NOTES
Essential hypertension  Counseled to take blood pressure medications prior to surgery     Thickened endometrium  A  Endometrium (biopsy):     - Endocervical glandular mucosa admixed with mucin      - Scant unremarkable-appearing ectocervical squamous mucosa  - Rare possible lower uterine segment mucosa noted  - No definitive endometrial tissue present for evaluation  Need for definitive tissue diagnosis  Scheduled for hysteroscopy, dilation and curettage on 3/25/2020 with Dr Daniella Herzog  The risks, benefits, alternatives, and indications of the procedure were discussed with the patient, including but not limited to: bleeding, infection, injury to the adjacent organs such as bladder, bowel  blood vesels, nerves, and ureters  She understands that if another organ is injured, it might require another procedure to repair it and an injury may not be recognized on the date of her surgery  We discussed that other problems such as the formation of blood clots, bowel obstruction, abscess, hematoma, lymphedema, or a fistula (abnormal connection between two organs) could develop later after surgery  We discussed the risk of bowel resection and/or colostomy  We discussed that unforeseen events can occur during or after surgery such as anesthesia complications, heart attack, stroke or excessive blood loss that could lead to permanent disability or death  We reviewed risks of possible blood tranfusions, including allergic reaction, chest pain, shortness of breath, and infectious risk (HIV/hepatitis)  Alternatives to recommended procedure were also discussed along with their risks and benefits  She was given an opportunity to ask questions which were answered to her satisfaction  She understood the information presented to her and wished to proceed with the recommended procedure  Written informed consent was obtained         Rudell Severe presents for preoperative history and physical, scheduled hysteroscopy, dilation and curettage for the indication of postmenopausal bleeding and thickened endometrium, in-office EMB insufficient  Without complaints today      Past Medical History:   Diagnosis Date    Asthma     Depression     Diabetes mellitus ([facility] [address] )     Hyperlipidemia     Hypertension     Kidney stone      Past Surgical History:   Procedure Laterality Date    APPENDECTOMY      CHOLECYSTECTOMY      KIDNEY STONE SURGERY      TUBAL LIGATION       No Known Allergies     Social History     Socioeconomic History    Marital status: Single     Spouse name: Not on file    Number of children: Not on file    Years of education: Not on file    Highest education level: Not on file   Occupational History    Not on file   Social Needs    Financial resource strain: Not on file    Food insecurity:     Worry: Not on file     Inability: Not on file    Transportation needs:     Medical: Not on file     Non-medical: Not on file   Tobacco Use    Smoking status: Never Smoker    Smokeless tobacco: Never Used   Substance and Sexual Activity    Alcohol use: Never     Frequency: Never    Drug use: Never    Sexual activity: Yes     Partners: Male     Birth control/protection: Female Sterilization   Lifestyle    Physical activity:     Days per week: Not on file     Minutes per session: Not on file    Stress: Not on file   Relationships    Social connections:     Talks on phone: Not on file     Gets together: Not on file     Attends Nondenominational service: Not on file     Active member of club or organization: Not on file     Attends meetings of clubs or organizations: Not on file     Relationship status: Not on file    Intimate partner violence:     Fear of current or ex partner: Not on file     Emotionally abused: Not on file     Physically abused: Not on file     Forced sexual activity: Not on file   Other Topics Concern    Not on file   Social History Narrative    ** Merged History Encounter ** Current Outpatient Medications on File Prior to Visit   Medication Sig Dispense Refill    albuterol (PROVENTIL HFA,VENTOLIN HFA) 90 mcg/act inhaler Inhale 2 puffs every 6 (six) hours as needed for wheezing or shortness of breath 1 Inhaler 0    ARIPiprazole (ABILIFY) 5 mg tablet Take 5 mg by mouth daily      atorvastatin (LIPITOR) 40 mg tablet Take 1 tablet (40 mg total) by mouth daily 30 tablet 3    buPROPion (WELLBUTRIN) 75 mg tablet Take 75 mg by mouth every morning       Cholecalciferol (VITAMIN D3) 25 MCG (1000 UT) CAPS Take by mouth      glucose blood (FREESTYLE TEST STRIPS) test strip Test 3xs daily 100 each 2    glucose monitoring kit (FREESTYLE) monitoring kit 1 each by Does not apply route 3 (three) times a day TEST 3XS DAILY 1 each 0    hydrochlorothiazide (HYDRODIURIL) 25 mg tablet Take 1 tablet (25 mg total) by mouth daily 30 tablet 3    lamoTRIgine (LaMICtal) 100 mg tablet Take 100 mg by mouth daily at bedtime      Lancets (FREESTYLE) lancets Test 3xs daily 100 each 2    lisinopril (ZESTRIL) 20 mg tablet Take 1 tablet (20 mg total) by mouth daily 20 tablet 0    metFORMIN (GLUCOPHAGE) 500 mg tablet Take 1 tablet (500 mg total) by mouth 2 (two) times a day with meals 60 tablet 3    metoprolol tartrate (LOPRESSOR) 25 mg tablet Take 25 mg by mouth daily       omeprazole (PriLOSEC) 20 mg delayed release capsule Take 20 mg by mouth daily      topiramate (TOPAMAX) 50 MG tablet Take 50 mg by mouth daily at bedtime       No current facility-administered medications on file prior to visit  Vitals:    03/10/20 0940   BP: 170/90       Physical Exam   Constitutional: She appears well-developed and well-nourished  No distress  Obese    Cardiovascular: Normal rate, regular rhythm, normal heart sounds and intact distal pulses  Pulmonary/Chest: Effort normal and breath sounds normal  No respiratory distress  Abdominal: Soft  Bowel sounds are normal  She exhibits no distension   There is no tenderness  Genitourinary: Vagina normal and uterus normal  There is no rash, tenderness or lesion on the right labia  There is no rash, tenderness or lesion on the left labia  Cervix exhibits no motion tenderness, no discharge and no friability  Right adnexum displays no mass, no tenderness and no fullness  Left adnexum displays no mass, no tenderness and no fullness  No erythema, tenderness or bleeding in the vagina  No signs of injury around the vagina  No vaginal discharge found  Genitourinary Comments: Anteverted uterus  Posterior cervix   Skin: She is not diaphoretic

## 2020-03-10 NOTE — PATIENT INSTRUCTIONS
Lawrence histeroscopía   CUIDADO AMBULATORIO:   Lo que usted necesita saber acerca de lawrence histerescopia:  Norma Banner es un procedimiento para encontrar y tratar problemas en vallecillo Zaid Mor  Lawrence histerescopia podría realizarse para encontrar y posiblemente tratar la causa de un sangrado vaginal anormal, problemas para quedar embarazada o un aborto espontáneo  También se puede realizar para introducir o retirar un dispositivo para evitar el embarazo  Cómo prepararse para lawrence histerectomía:   · Vallecillo procedimiento podría realizarse en el consultorio de vallecillo médico, en lawrence clínica ambulatoria o en un hospital  Mis Sack arreglos para que alguna otra persona lo transporte a vallecillo hogar después del procedimiento  Vallecillo médico hablará con usted sobre cómo prepararse  Es posible que le indiquen que no coma ni tome nada pasada la medianoche del día del procedimiento  · Es posible que necesite suspender el uso de anticoagulantes o aspirina varios días antes de vallecillo procedimiento  [place] ayudará a disminuir vallecillo riesgo de sangrado  Vallecillo médico le indicará cuáles medicamentos hoang y cuáles no en el día de vallecillo procedimiento  Es posible que le indiquen que tome ibuprofeno el día de vallecillo procedimiento para controlar el dolor  Podrían darle un antibiótico para prevenir infecciones  Informe a fiona médicos si usted alguna vez tuvo lawrence reacción alérgica a un antibiótico      · Vallecillo médico podría administrarle medicamento en vallecillo radha uterino antes de vallecillo procedimiento  [place] le ayudará a abrir vallecillo radha uterino para que el endoscopio pueda ser introducido en vallecillo útero más fácilmente  Un endoscopio es lawrence sonda pequeña con Jake Thurmanle vannesa y Brenda Crejovita en el extremo  Qué sucederá sumit lawrence histerescopia:   · Es posible que le administren anestesia general para mantenerlo dormido y sin dolor  Es posible que en vez de eso le administren medicamentos para ayudarla a relajarse y anestesia local o raquídea   Con la anestesia local o raquídea, es posible que aún sienta presión o molestias, yong no debería sentir ningún dolor  Vallecillo médico introducirá suavemente un dispositivo en vallecillo vagina  El dispositivo abre las saldana de vallecillo vagina para que vallecillo médico pueda roxane vallecillo radha uterino  Se usarán instrumentos o medicamento para abrir vallecillo radha uterino  · Vallecillo médico introducirá el endoscopio a través de vallecillo radha uterino y [place] de vallecillo útero  Vallecillo médico podría inyectar aire o líquido para ayudar a roxane vallecillo útero más claramente  Los instrumentos que se introducen a través del endoscopio podrían usarse para quitar tejido cicatrizante, tumores gavi pólipos o fibromas, o hoang Ale[place]andro Dock de tejido  También podrían usarse instrumentos para controlar el sangrado  · Puede usarse lawrence compresa vaginal o toalla sanitaria vaginal para absorber el sangrado  Lawrence compresa vaginal es lawrence gasa especial que se coloca en la vagina  Se lo quitarán antes de que se vaya a vallecillo casa  Qué sucederá después de lawrence histeroscopia:  Los médicos lo mantendrán bajo observación hasta que se despierte  Es posible que le den de eliot para ir a casa o que tenga que pasar la noche en el hospital  Es normal tener sangrado vaginal y calambres de 2 a 3 días después de vallecillo procedimiento  Vallecillo sangrado podría variar entre [facility] tolla a empaparla cada 2 a 3 horas  Usted podría roxane coágulos de dominguez en la toalla  Comuníquese con vallecillo médico si usted ve coágulos de [address] [facility] son más grandes que el tamaño de lawrence moneda de [address]  Riesgos de lawrence histerescopia:  Usted podría contraer lawrence infección sangrar más de lo esperado  Se podría formar tejido cicatricial en vallecillo útero  [place] podría dificultar que Melanie Horacio  Usted podría presentar lawrence reacción alérgica al líquido que le inyecten en vallecillo Threasa Asotin  El líquido podría acumularse y provocar problemas en fiona pulmones, corazón o cerebro  [place] podría poner en peligro vallecillo barry   El endoscopio o los instrumentos podrían hacer lawrence perforación en vallecillo radha uterino, Threasa Asotin, intestinos o vejiga  [place] podría requerir de otras cirugías para repararlos y podría representar lawrence amenaza para vallecillo barry  Llame al 911 si presenta:   · Usted tiene dificultad para respirar  Busque atención médica de inmediato si:   · Usted presenta sangrado vaginal que empapa 1 toalla higiénica o más en 1 hora  · Usted tiene dolor intenso o inflamación en vallecillo abdomen  · Usted se siente débil y confundido, o siente que se desvanecerá  · Usted orina con dominguez  · Usted ministerio de orinar u orina menos de lo normal   Pregúntele a vallecillo médico qué vitaminas y minerales son adecuados para usted  · Usted tiene fiebre o escalofríos  · Vallecillo vagina supura pus o huele mal      · Usted nota coágulos de [place] en vallecillo toalla sanitaria que son más grandes que el tamaño de lawrence moneda de [address]  · Usted tiene náuseas o está vomitando  · Usted tiene comezón en la piel, inflamación o un sarpullido  · Usted tiene preguntas o inquietudes acerca de vallecillo condición o cuidado  Medicamentos:  Es posible que usted necesite alguno de los siguientes:  · Estrógeno  ayuda a sanar el revestimiento de vallecillo útero  · Antibióticos  ayudan a evitar lawrence infección bacteriana  · Un medicamento con receta para el dolor  podrían ser Belenda Simpers  Pregunte al médico cómo debe hoang dalia medicamento de forma ramos  Algunos medicamentos recetados para el dolor contienen acetaminofén  No tome otros medicamentos que contengan acetaminofén sin consultarlo con vallecillo médico  Demasiado acetaminofeno puede causar daño al hígado  Los medicamentos recetados para el dolor podrían causar estreñimiento  Pregunte a vallecillo médico gavi prevenir o tratar estreñimiento  · AINEs (Analgésicos antiinflamatorios no esteroides) gavi el ibuprofeno, ayudan a disminuir la inflamación, el dolor y la [place]  Los AINEs pueden causar sangrado estomacal o problemas renales en ciertas personas   Si usted emma un medicamento anticoagulante, siempre pregúntele a vallecillo médico si Keary Niranjan son seguros para usted  Siempre alicia la etiqueta de dalia medicamento y [place] instrucciones  · [place] fiona medicamentos gavi se le haya indicado  Consulte con hemphill médico si usted apoorva que hemphill medicamento no le está ayudando o si presenta efectos secundarios  Infórmele si es alérgico a cualquier medicamento  Mantenga lawrence lista actualizada de los [employer], las vitaminas y los productos herbales que emma  Incluya los siguientes datos de los medicamentos: cantidad, frecuencia y motivo de administración  Traiga con usted la lista o los envases de la píldoras a fiona citas de seguimiento  Lleve la lista de los medicamentos con usted en luisa de lawrence emergencia  Actividad:  No tenga relaciones sexuales, no use tampones ni tome duchas hasta por 6 semanas  Estas acciones podrían causar [place]  Pregunte a hemphill médico si es recomendable hoang un baño en la ricky o ir a nadar  Descanse tanto gavi sea necesario  No maneje, ni regrese a trabajar, ni se ejercite por las siguientes 24 horas o gavi se le indique  Usted puede regresar a hacer la [address] [place] en 1 a 2 días  Acuda a fiona consultas de control con hemphill médico según le indicaron  Anote fiona preguntas para que se acuerde de hacerlas sumit fiona visitas  © 2017 [facility] [place]note 2[facility] Information is for End User's use only and may not be sold, redistributed or otherwise used for commercial purposes  All illustrations and images included in CareNotes® are the copyrighted property of A D A M , Inc  or Brad Anne  Esta información es sólo para uso en educación  Hemph[place] intención no es darle un consejo médico sobre enfermedades o tratamientos  Colsulte con hemphill Venita [facility] antes de seguir cualquier régimen médico para saber si es seguro y efectivo para usted

## 2020-03-18 ENCOUNTER — TELEPHONE (OUTPATIENT)
Dept: OBGYN CLINIC | Facility: CLINIC | Age: 65
End: 2020-03-18

## 2020-03-18 NOTE — TELEPHONE ENCOUNTER
Left a voicemail for the patient to call our office in regards to her upcoming surgery scheduled for 03/25/2020  Provided Kiya Simon's name and myself as a person to contact  [address]'s office numbers  Will attempt again to discuss that given the COVID-19 situation, her surgery case was reviewed by surgeons who indicated that it is safe to postpone her surgery  We will contact the patient ASAP once the situation normalizes and the case can be put back on the schedule

## 2020-03-19 NOTE — TELEPHONE ENCOUNTER
Explained to that given the COVID-19 situation, her surgery case was reviewed by surgeons who indicated that it is safe to postpone her surgery  We will contact the patient ASAP once the situation normalizes and the case can be put back on the schedule  Pt verbalized understanding and agreed to plan of care

## 2020-03-26 DIAGNOSIS — J45.20 MILD INTERMITTENT ASTHMA, UNSPECIFIED WHETHER COMPLICATED: ICD-10-CM

## 2020-03-26 RX ORDER — ALBUTEROL SULFATE 90 UG/1
2 AEROSOL, METERED RESPIRATORY (INHALATION) EVERY 6 HOURS PRN
Qty: 1 INHALER | Refills: 0 | Status: SHIPPED | OUTPATIENT
Start: 2020-03-26 | End: 2020-07-28 | Stop reason: SDUPTHER

## 2020-03-30 ENCOUNTER — TELEMEDICINE (OUTPATIENT)
Dept: FAMILY MEDICINE CLINIC | Facility: CLINIC | Age: 65
End: 2020-03-30

## 2020-03-30 DIAGNOSIS — E11.9 TYPE 2 DIABETES MELLITUS WITHOUT COMPLICATION, WITHOUT LONG-TERM CURRENT USE OF INSULIN (HCC): ICD-10-CM

## 2020-03-30 DIAGNOSIS — E78.5 HYPERLIPIDEMIA, UNSPECIFIED HYPERLIPIDEMIA TYPE: ICD-10-CM

## 2020-03-30 DIAGNOSIS — F32.A DEPRESSION, UNSPECIFIED DEPRESSION TYPE: ICD-10-CM

## 2020-03-30 DIAGNOSIS — I10 ESSENTIAL HYPERTENSION: Primary | Chronic | ICD-10-CM

## 2020-03-30 PROCEDURE — T1015 CLINIC SERVICE: HCPCS | Performed by: FAMILY MEDICINE

## 2020-03-30 PROCEDURE — G2012 BRIEF CHECK IN BY MD/QHP: HCPCS | Performed by: FAMILY MEDICINE

## 2020-03-30 RX ORDER — BLOOD PRESSURE TEST KIT
KIT MISCELLANEOUS DAILY
Qty: 1 EACH | Refills: 0 | Status: SHIPPED | OUTPATIENT
Start: 2020-03-30 | End: 2020-09-28 | Stop reason: ALTCHOICE

## 2020-03-30 NOTE — ASSESSMENT & PLAN NOTE
Patient denies any suicidal or homicidal ideations states that she feels well  -continue following up with Psychiatry  -continue Lamictal 100 mg, Topamax 50 mg, Abilify 5 mg, Wellbutrin 75 mg

## 2020-03-30 NOTE — ASSESSMENT & PLAN NOTE
Lab Results   Component Value Date    HGBA1C 6 6 (H) 03/02/2020     Diabetes is very well controlled with an HbA1c of 6 6  -continue metformin 500 mg b i d , patient denies any side effects medication  -continue lisinopril 20 mg for renal protective value  -continue high-intensity atorvastatin 40 mg

## 2020-03-30 NOTE — ASSESSMENT & PLAN NOTE
Blood pressure is unable to be assessed  -patient will  blood pressure kit from pharmacy  -continue hydrochlorothiazide 25 mg lisinopril 20 mg, metoprolol 25 mg

## 2020-03-30 NOTE — PROGRESS NOTES
Virtual Brief Visit    Problem List Items Addressed This Visit        Endocrine    Diabetes mellitus ([facility] [address] )       Lab Results   Component Value Date    HGBA1C 6 6 (H) 03/02/2020     Diabetes is very well controlled with an HbA1c of 6 6  -continue metformin 500 mg b i d , patient denies any side effects medication  -continue lisinopril 20 mg for renal protective value  -continue high-intensity atorvastatin 40 mg            Cardiovascular and Mediastinum    Essential hypertension - Primary (Chronic)     Blood pressure is unable to be assessed  -patient will  blood pressure kit from pharmacy  -continue hydrochlorothiazide 25 mg lisinopril 20 mg, metoprolol 25 mg            Other    Hyperlipidemia     Patient is on high-intensity statin atorvastatin 40 mg         Depression     Patient denies any suicidal or homicidal ideations states that she feels well  -continue following up with Psychiatry  -continue Lamictal 100 mg, Topamax 50 mg, Abilify 5 mg, Wellbutrin 75 mg                   Reason for visit is discuss chronic medical conditions including diabetes mellitus, hypertension, hyperlipidemia, depression as well as acute medical condition which include kidney stone  Encounter provider Mario Andrade MD    Provider located at [address] [place] [id number] [phone]      Recent Visits  No visits were found meeting these conditions  Showing recent visits within past 7 days and meeting all other requirements     Today's Visits  Date Type Provider Dept   03/30/20 Telemedicine Mario Andrade MD [facility]   Showing today's visits and meeting all other requirements     Future Appointments  No visits were found meeting these conditions  Showing future appointments within next 150 days and meeting all other requirements        After connecting through telephone, the patient was identified by name and date of birth   Alla Guadalupe was informed that this is a telemedicine visit and that the visit is being conducted through telephone  My office door was closed  No one else was in the room  She acknowledged consent and understanding of privacy and security of the video platform  The patient has agreed to participate and understands they can discontinue the visit at any time  Patient is aware this is a billable service  Subjective  Karon Gandhi is a 59 y o  female who was evaluated via telephone encounter for her chronic medical conditions which include hypertension, hyperlipidemia, depression, diabetes mellitus, as well as kidney stone         Past Medical History:   Diagnosis Date    Asthma     Depression     Diabetes mellitus ([address] )     Hyperlipidemia     Hypertension     Kidney stone        Past Surgical History:   Procedure Laterality Date    APPENDECTOMY      CHOLECYSTECTOMY      KIDNEY STONE SURGERY      TUBAL LIGATION         Current Outpatient Medications   Medication Sig Dispense Refill    albuterol (PROVENTIL HFA,VENTOLIN HFA) 90 mcg/act inhaler Inhale 2 puffs every 6 (six) hours as needed for wheezing or shortness of breath 1 Inhaler 0    ARIPiprazole (ABILIFY) 5 mg tablet Take 5 mg by mouth daily      atorvastatin (LIPITOR) 40 mg tablet Take 1 tablet (40 mg total) by mouth daily 30 tablet 3    buPROPion (WELLBUTRIN) 75 mg tablet Take 75 mg by mouth every morning       Cholecalciferol (VITAMIN D3) 25 MCG (1000 UT) CAPS Take by mouth      glucose blood (FREESTYLE TEST STRIPS) test strip Test 3xs daily 100 each 2    glucose monitoring kit (FREESTYLE) monitoring kit 1 each by Does not apply route 3 (three) times a day TEST 3XS DAILY 1 each 0    hydrochlorothiazide (HYDRODIURIL) 25 mg tablet Take 1 tablet (25 mg total) by mouth daily 30 tablet 3    lamoTRIgine (LaMICtal) 100 mg tablet Take 100 mg by mouth daily at bedtime      Lancets (FREESTYLE) lancets Test 3xs daily 100 each 2    lisinopril (ZESTRIL) 20 mg tablet Take 1 tablet (20 mg total) by mouth daily 20 tablet 0    metFORMIN (GLUCOPHAGE) 500 mg tablet Take 1 tablet (500 mg total) by mouth 2 (two) times a day with meals 60 tablet 3    metoprolol tartrate (LOPRESSOR) 25 mg tablet Take 25 mg by mouth daily       omeprazole (PriLOSEC) 20 mg delayed release capsule Take 20 mg by mouth daily      topiramate (TOPAMAX) 50 MG tablet Take 50 mg by mouth daily at bedtime       No current facility-administered medications for this visit  No Known Allergies    Review of Systems   Constitutional: Negative for chills and fever  HENT: Negative for congestion and sore throat  Eyes: Negative for visual disturbance  Respiratory: Negative for wheezing  Cardiovascular: Negative for chest pain  Gastrointestinal: Negative for abdominal pain, blood in stool and nausea  Endocrine: Negative for cold intolerance and heat intolerance  Genitourinary: Negative for dysuria and hematuria  Musculoskeletal: Negative for gait problem  Skin: Negative for rash  Allergic/Immunologic: Negative for environmental allergies  Neurological: Negative for syncope  Hematological: Does not bruise/bleed easily  Psychiatric/Behavioral: Negative for behavioral problems  I spent 15 minutes with the patient during this visit

## 2020-04-16 ENCOUNTER — TELEMEDICINE (OUTPATIENT)
Dept: BARIATRICS | Facility: CLINIC | Age: 65
End: 2020-04-16
Payer: COMMERCIAL

## 2020-04-16 VITALS — BODY MASS INDEX: 37.9 KG/M2 | WEIGHT: 222 LBS | HEIGHT: 64 IN

## 2020-04-16 DIAGNOSIS — E66.01 CLASS 3 SEVERE OBESITY DUE TO EXCESS CALORIES WITH SERIOUS COMORBIDITY AND BODY MASS INDEX (BMI) OF 40.0 TO 44.9 IN ADULT (HCC): Primary | ICD-10-CM

## 2020-04-16 DIAGNOSIS — F32.A DEPRESSION, UNSPECIFIED DEPRESSION TYPE: ICD-10-CM

## 2020-04-16 DIAGNOSIS — E78.5 HYPERLIPIDEMIA, UNSPECIFIED HYPERLIPIDEMIA TYPE: ICD-10-CM

## 2020-04-16 DIAGNOSIS — E66.01 MORBID OBESITY WITH BMI OF 40.0-44.9, ADULT (HCC): ICD-10-CM

## 2020-04-16 DIAGNOSIS — E11.9 TYPE 2 DIABETES MELLITUS WITHOUT COMPLICATION, WITHOUT LONG-TERM CURRENT USE OF INSULIN (HCC): ICD-10-CM

## 2020-04-16 DIAGNOSIS — I10 ESSENTIAL HYPERTENSION: Chronic | ICD-10-CM

## 2020-04-16 PROCEDURE — 99204 OFFICE O/P NEW MOD 45 MIN: CPT | Performed by: FAMILY MEDICINE

## 2020-04-29 ENCOUNTER — TELEPHONE (OUTPATIENT)
Dept: LABOR AND DELIVERY | Facility: HOSPITAL | Age: 65
End: 2020-04-29

## 2020-05-12 ENCOUNTER — OFFICE VISIT (OUTPATIENT)
Dept: OBGYN CLINIC | Facility: CLINIC | Age: 65
End: 2020-05-12

## 2020-05-12 VITALS
TEMPERATURE: 97 F | WEIGHT: 222.2 LBS | DIASTOLIC BLOOD PRESSURE: 80 MMHG | HEIGHT: 64 IN | BODY MASS INDEX: 37.94 KG/M2 | HEART RATE: 84 BPM | SYSTOLIC BLOOD PRESSURE: 140 MMHG

## 2020-05-12 DIAGNOSIS — Z01.818 PRE-OP EVALUATION: Primary | ICD-10-CM

## 2020-05-12 DIAGNOSIS — N88.2 CERVICAL STENOSIS (UTERINE CERVIX): ICD-10-CM

## 2020-05-12 DIAGNOSIS — I10 ESSENTIAL HYPERTENSION: Primary | ICD-10-CM

## 2020-05-12 DIAGNOSIS — N95.0 POSTMENOPAUSAL BLEEDING: ICD-10-CM

## 2020-05-12 PROCEDURE — 3044F HG A1C LEVEL LT 7.0%: CPT | Performed by: STUDENT IN AN ORGANIZED HEALTH CARE EDUCATION/TRAINING PROGRAM

## 2020-05-12 PROCEDURE — 1036F TOBACCO NON-USER: CPT | Performed by: STUDENT IN AN ORGANIZED HEALTH CARE EDUCATION/TRAINING PROGRAM

## 2020-05-12 PROCEDURE — 99213 OFFICE O/P EST LOW 20 MIN: CPT | Performed by: OBSTETRICS & GYNECOLOGY

## 2020-05-12 PROCEDURE — 3060F POS MICROALBUMINURIA REV: CPT | Performed by: STUDENT IN AN ORGANIZED HEALTH CARE EDUCATION/TRAINING PROGRAM

## 2020-05-12 PROCEDURE — 3008F BODY MASS INDEX DOCD: CPT | Performed by: STUDENT IN AN ORGANIZED HEALTH CARE EDUCATION/TRAINING PROGRAM

## 2020-05-12 PROCEDURE — 3077F SYST BP >= 140 MM HG: CPT | Performed by: STUDENT IN AN ORGANIZED HEALTH CARE EDUCATION/TRAINING PROGRAM

## 2020-05-12 PROCEDURE — 3079F DIAST BP 80-89 MM HG: CPT | Performed by: STUDENT IN AN ORGANIZED HEALTH CARE EDUCATION/TRAINING PROGRAM

## 2020-05-12 RX ORDER — MISOPROSTOL 200 UG/1
200 TABLET ORAL 4 TIMES DAILY
Qty: 1 TABLET | Refills: 0 | Status: SHIPPED | OUTPATIENT
Start: 2020-05-12 | End: 2020-08-21 | Stop reason: ALTCHOICE

## 2020-05-14 ENCOUNTER — CONSULT (OUTPATIENT)
Dept: FAMILY MEDICINE CLINIC | Facility: CLINIC | Age: 65
End: 2020-05-14

## 2020-05-14 VITALS
HEART RATE: 74 BPM | BODY MASS INDEX: 37.9 KG/M2 | DIASTOLIC BLOOD PRESSURE: 80 MMHG | HEIGHT: 64 IN | RESPIRATION RATE: 16 BRPM | TEMPERATURE: 97.6 F | WEIGHT: 222 LBS | SYSTOLIC BLOOD PRESSURE: 120 MMHG | OXYGEN SATURATION: 96 %

## 2020-05-14 DIAGNOSIS — Z01.818 PREOPERATIVE CLEARANCE: ICD-10-CM

## 2020-05-14 DIAGNOSIS — I10 ESSENTIAL HYPERTENSION: Primary | Chronic | ICD-10-CM

## 2020-05-14 DIAGNOSIS — E11.9 TYPE 2 DIABETES MELLITUS WITHOUT COMPLICATION, WITHOUT LONG-TERM CURRENT USE OF INSULIN (HCC): ICD-10-CM

## 2020-05-14 PROCEDURE — 1036F TOBACCO NON-USER: CPT | Performed by: FAMILY MEDICINE

## 2020-05-14 PROCEDURE — 3079F DIAST BP 80-89 MM HG: CPT | Performed by: FAMILY MEDICINE

## 2020-05-14 PROCEDURE — 3288F FALL RISK ASSESSMENT DOCD: CPT | Performed by: FAMILY MEDICINE

## 2020-05-14 PROCEDURE — 99213 OFFICE O/P EST LOW 20 MIN: CPT | Performed by: FAMILY MEDICINE

## 2020-05-14 PROCEDURE — 3008F BODY MASS INDEX DOCD: CPT | Performed by: FAMILY MEDICINE

## 2020-05-14 PROCEDURE — 3044F HG A1C LEVEL LT 7.0%: CPT | Performed by: FAMILY MEDICINE

## 2020-05-14 PROCEDURE — 3074F SYST BP LT 130 MM HG: CPT | Performed by: FAMILY MEDICINE

## 2020-05-14 PROCEDURE — 3060F POS MICROALBUMINURIA REV: CPT | Performed by: FAMILY MEDICINE

## 2020-05-14 PROCEDURE — 1101F PT FALLS ASSESS-DOCD LE1/YR: CPT | Performed by: FAMILY MEDICINE

## 2020-05-22 DIAGNOSIS — N95.0 POSTMENOPAUSAL BLEEDING: ICD-10-CM

## 2020-05-22 DIAGNOSIS — R93.89 THICKENED ENDOMETRIUM: ICD-10-CM

## 2020-05-22 PROCEDURE — U0003 INFECTIOUS AGENT DETECTION BY NUCLEIC ACID (DNA OR RNA); SEVERE ACUTE RESPIRATORY SYNDROME CORONAVIRUS 2 (SARS-COV-2) (CORONAVIRUS DISEASE [COVID-19]), AMPLIFIED PROBE TECHNIQUE, MAKING USE OF HIGH THROUGHPUT TECHNOLOGIES AS DESCRIBED BY CMS-2020-01-R: HCPCS

## 2020-05-24 LAB — SARS-COV-2 RNA SPEC QL NAA+PROBE: NOT DETECTED

## 2020-05-26 ENCOUNTER — ANESTHESIA EVENT (OUTPATIENT)
Dept: PERIOP | Facility: HOSPITAL | Age: 65
End: 2020-05-26
Payer: COMMERCIAL

## 2020-05-27 ENCOUNTER — TELEPHONE (OUTPATIENT)
Dept: OBGYN CLINIC | Facility: CLINIC | Age: 65
End: 2020-05-27

## 2020-05-27 ENCOUNTER — HOSPITAL ENCOUNTER (OUTPATIENT)
Facility: HOSPITAL | Age: 65
Setting detail: OUTPATIENT SURGERY
Discharge: HOME | End: 2020-05-27
Attending: OBSTETRICS & GYNECOLOGY | Admitting: OBSTETRICS & GYNECOLOGY
Payer: COMMERCIAL

## 2020-05-27 ENCOUNTER — ANESTHESIA (OUTPATIENT)
Dept: PERIOP | Facility: HOSPITAL | Age: 65
End: 2020-05-27
Payer: COMMERCIAL

## 2020-05-27 VITALS
TEMPERATURE: 97.4 F | HEIGHT: 64 IN | DIASTOLIC BLOOD PRESSURE: 72 MMHG | WEIGHT: 222 LBS | HEART RATE: 72 BPM | BODY MASS INDEX: 37.9 KG/M2 | OXYGEN SATURATION: 96 % | SYSTOLIC BLOOD PRESSURE: 128 MMHG | RESPIRATION RATE: 16 BRPM

## 2020-05-27 DIAGNOSIS — E11.9 TYPE 2 DIABETES MELLITUS WITHOUT COMPLICATION, WITHOUT LONG-TERM CURRENT USE OF INSULIN (HCC): ICD-10-CM

## 2020-05-27 DIAGNOSIS — N95.0 POSTMENOPAUSAL BLEEDING: Primary | ICD-10-CM

## 2020-05-27 DIAGNOSIS — R93.89 THICKENED ENDOMETRIUM: ICD-10-CM

## 2020-05-27 LAB
ATRIAL RATE: 77 BPM
GLUCOSE SERPL-MCNC: 104 MG/DL (ref 65–140)
GLUCOSE SERPL-MCNC: 106 MG/DL (ref 65–140)
P AXIS: 42 DEGREES
PR INTERVAL: 158 MS
QRS AXIS: -8 DEGREES
QRSD INTERVAL: 98 MS
QT INTERVAL: 418 MS
QTC INTERVAL: 473 MS
T WAVE AXIS: 45 DEGREES
VENTRICULAR RATE: 77 BPM

## 2020-05-27 PROCEDURE — 93010 ELECTROCARDIOGRAM REPORT: CPT | Performed by: INTERNAL MEDICINE

## 2020-05-27 PROCEDURE — 93005 ELECTROCARDIOGRAM TRACING: CPT

## 2020-05-27 PROCEDURE — 82948 REAGENT STRIP/BLOOD GLUCOSE: CPT

## 2020-05-27 PROCEDURE — 88305 TISSUE EXAM BY PATHOLOGIST: CPT | Performed by: PATHOLOGY

## 2020-05-27 PROCEDURE — 58558 HYSTEROSCOPY BIOPSY: CPT | Performed by: OBSTETRICS & GYNECOLOGY

## 2020-05-27 RX ORDER — FENTANYL CITRATE/PF 50 MCG/ML
50 SYRINGE (ML) INJECTION
Status: DISCONTINUED | OUTPATIENT
Start: 2020-05-27 | End: 2020-05-27 | Stop reason: HOSPADM

## 2020-05-27 RX ORDER — MAGNESIUM HYDROXIDE 1200 MG/15ML
LIQUID ORAL AS NEEDED
Status: DISCONTINUED | OUTPATIENT
Start: 2020-05-27 | End: 2020-05-27 | Stop reason: HOSPADM

## 2020-05-27 RX ORDER — IBUPROFEN 600 MG/1
600 TABLET ORAL EVERY 6 HOURS PRN
Status: DISCONTINUED | OUTPATIENT
Start: 2020-05-27 | End: 2020-05-27 | Stop reason: HOSPADM

## 2020-05-27 RX ORDER — LANCETS 28 GAUGE
EACH MISCELLANEOUS
Qty: 100 EACH | Refills: 2 | Status: SHIPPED | OUTPATIENT
Start: 2020-05-27 | End: 2020-09-10 | Stop reason: SDUPTHER

## 2020-05-27 RX ORDER — MEPERIDINE HYDROCHLORIDE 50 MG/ML
12.5 INJECTION INTRAMUSCULAR; INTRAVENOUS; SUBCUTANEOUS ONCE AS NEEDED
Status: DISCONTINUED | OUTPATIENT
Start: 2020-05-27 | End: 2020-05-27 | Stop reason: HOSPADM

## 2020-05-27 RX ORDER — HYDROMORPHONE HCL/PF 1 MG/ML
0.5 SYRINGE (ML) INJECTION
Status: DISCONTINUED | OUTPATIENT
Start: 2020-05-27 | End: 2020-05-27 | Stop reason: HOSPADM

## 2020-05-27 RX ORDER — FENTANYL CITRATE 50 UG/ML
INJECTION, SOLUTION INTRAMUSCULAR; INTRAVENOUS AS NEEDED
Status: DISCONTINUED | OUTPATIENT
Start: 2020-05-27 | End: 2020-05-27 | Stop reason: SURG

## 2020-05-27 RX ORDER — ONDANSETRON 2 MG/ML
INJECTION INTRAMUSCULAR; INTRAVENOUS AS NEEDED
Status: DISCONTINUED | OUTPATIENT
Start: 2020-05-27 | End: 2020-05-27 | Stop reason: SURG

## 2020-05-27 RX ORDER — MIDAZOLAM HYDROCHLORIDE 2 MG/2ML
INJECTION, SOLUTION INTRAMUSCULAR; INTRAVENOUS AS NEEDED
Status: DISCONTINUED | OUTPATIENT
Start: 2020-05-27 | End: 2020-05-27 | Stop reason: SURG

## 2020-05-27 RX ORDER — LIDOCAINE HYDROCHLORIDE 20 MG/ML
INJECTION, SOLUTION EPIDURAL; INFILTRATION; INTRACAUDAL; PERINEURAL AS NEEDED
Status: DISCONTINUED | OUTPATIENT
Start: 2020-05-27 | End: 2020-05-27 | Stop reason: SURG

## 2020-05-27 RX ORDER — SENNOSIDES 8.6 MG
650 CAPSULE ORAL EVERY 8 HOURS PRN
Qty: 30 TABLET | Refills: 0 | Status: SHIPPED | OUTPATIENT
Start: 2020-05-27 | End: 2020-07-28 | Stop reason: SDUPTHER

## 2020-05-27 RX ORDER — PROPOFOL 10 MG/ML
INJECTION, EMULSION INTRAVENOUS AS NEEDED
Status: DISCONTINUED | OUTPATIENT
Start: 2020-05-27 | End: 2020-05-27 | Stop reason: SURG

## 2020-05-27 RX ORDER — SODIUM CHLORIDE 9 MG/ML
125 INJECTION, SOLUTION INTRAVENOUS CONTINUOUS
Status: DISCONTINUED | OUTPATIENT
Start: 2020-05-27 | End: 2020-05-27 | Stop reason: HOSPADM

## 2020-05-27 RX ORDER — ACETAMINOPHEN 325 MG/1
650 TABLET ORAL EVERY 6 HOURS PRN
Status: DISCONTINUED | OUTPATIENT
Start: 2020-05-27 | End: 2020-05-27 | Stop reason: HOSPADM

## 2020-05-27 RX ORDER — ONDANSETRON 2 MG/ML
4 INJECTION INTRAMUSCULAR; INTRAVENOUS ONCE AS NEEDED
Status: DISCONTINUED | OUTPATIENT
Start: 2020-05-27 | End: 2020-05-27 | Stop reason: HOSPADM

## 2020-05-27 RX ORDER — ONDANSETRON 2 MG/ML
4 INJECTION INTRAMUSCULAR; INTRAVENOUS EVERY 6 HOURS PRN
Status: DISCONTINUED | OUTPATIENT
Start: 2020-05-27 | End: 2020-05-27 | Stop reason: HOSPADM

## 2020-05-27 RX ORDER — OXYCODONE HYDROCHLORIDE 5 MG/1
5 TABLET ORAL EVERY 4 HOURS PRN
Status: DISCONTINUED | OUTPATIENT
Start: 2020-05-27 | End: 2020-05-27 | Stop reason: HOSPADM

## 2020-05-27 RX ADMIN — PROPOFOL 200 MG: 10 INJECTION, EMULSION INTRAVENOUS at 13:42

## 2020-05-27 RX ADMIN — FENTANYL CITRATE 50 MCG: 50 INJECTION, SOLUTION INTRAMUSCULAR; INTRAVENOUS at 13:42

## 2020-05-27 RX ADMIN — LIDOCAINE HYDROCHLORIDE 100 MG: 20 INJECTION, SOLUTION EPIDURAL; INFILTRATION; INTRACAUDAL; PERINEURAL at 13:42

## 2020-05-27 RX ADMIN — SODIUM CHLORIDE 125 ML/HR: 0.9 INJECTION, SOLUTION INTRAVENOUS at 12:27

## 2020-05-27 RX ADMIN — SODIUM CHLORIDE: 0.9 INJECTION, SOLUTION INTRAVENOUS at 14:14

## 2020-05-27 RX ADMIN — ONDANSETRON 4 MG: 2 INJECTION INTRAMUSCULAR; INTRAVENOUS at 13:56

## 2020-05-27 RX ADMIN — FENTANYL CITRATE 50 MCG: 50 INJECTION, SOLUTION INTRAMUSCULAR; INTRAVENOUS at 14:00

## 2020-05-27 RX ADMIN — MIDAZOLAM 2 MG: 1 INJECTION INTRAMUSCULAR; INTRAVENOUS at 13:37

## 2020-05-28 ENCOUNTER — TELEPHONE (OUTPATIENT)
Dept: OBGYN CLINIC | Facility: CLINIC | Age: 65
End: 2020-05-28

## 2020-06-02 ENCOUNTER — TELEPHONE (OUTPATIENT)
Dept: SURGICAL ONCOLOGY | Facility: CLINIC | Age: 65
End: 2020-06-02

## 2020-06-02 ENCOUNTER — PROCEDURE VISIT (OUTPATIENT)
Dept: OBGYN CLINIC | Facility: CLINIC | Age: 65
End: 2020-06-02

## 2020-06-02 VITALS
HEART RATE: 100 BPM | SYSTOLIC BLOOD PRESSURE: 148 MMHG | WEIGHT: 223 LBS | BODY MASS INDEX: 38.28 KG/M2 | DIASTOLIC BLOOD PRESSURE: 94 MMHG | TEMPERATURE: 97.9 F

## 2020-06-02 DIAGNOSIS — C54.1 ENDOMETRIAL CANCER, GRADE I (HCC): Primary | ICD-10-CM

## 2020-06-02 PROCEDURE — 3080F DIAST BP >= 90 MM HG: CPT | Performed by: OBSTETRICS & GYNECOLOGY

## 2020-06-02 PROCEDURE — 3060F POS MICROALBUMINURIA REV: CPT | Performed by: OBSTETRICS & GYNECOLOGY

## 2020-06-02 PROCEDURE — 3077F SYST BP >= 140 MM HG: CPT | Performed by: OBSTETRICS & GYNECOLOGY

## 2020-06-02 PROCEDURE — 99213 OFFICE O/P EST LOW 20 MIN: CPT | Performed by: OBSTETRICS & GYNECOLOGY

## 2020-06-02 PROCEDURE — 3044F HG A1C LEVEL LT 7.0%: CPT | Performed by: OBSTETRICS & GYNECOLOGY

## 2020-06-02 PROCEDURE — 1036F TOBACCO NON-USER: CPT | Performed by: OBSTETRICS & GYNECOLOGY

## 2020-06-03 ENCOUNTER — DOCUMENTATION (OUTPATIENT)
Dept: HEMATOLOGY ONCOLOGY | Facility: CLINIC | Age: 65
End: 2020-06-03

## 2020-06-04 DIAGNOSIS — E78.5 HYPERLIPIDEMIA, UNSPECIFIED HYPERLIPIDEMIA TYPE: ICD-10-CM

## 2020-06-04 DIAGNOSIS — I10 ESSENTIAL HYPERTENSION: Chronic | ICD-10-CM

## 2020-06-04 DIAGNOSIS — E11.9 TYPE 2 DIABETES MELLITUS WITHOUT COMPLICATION, WITHOUT LONG-TERM CURRENT USE OF INSULIN (HCC): ICD-10-CM

## 2020-06-05 DIAGNOSIS — E11.9 TYPE 2 DIABETES MELLITUS WITHOUT COMPLICATION, WITHOUT LONG-TERM CURRENT USE OF INSULIN (HCC): ICD-10-CM

## 2020-06-05 RX ORDER — ATORVASTATIN CALCIUM 40 MG/1
TABLET, FILM COATED ORAL
Qty: 30 TABLET | Refills: 2 | Status: SHIPPED | OUTPATIENT
Start: 2020-06-05 | End: 2020-07-28 | Stop reason: SDUPTHER

## 2020-06-05 RX ORDER — HYDROCHLOROTHIAZIDE 25 MG/1
TABLET ORAL
Qty: 30 TABLET | Refills: 2 | Status: ON HOLD | OUTPATIENT
Start: 2020-06-05 | End: 2020-07-10 | Stop reason: SDUPTHER

## 2020-06-08 ENCOUNTER — TELEPHONE (OUTPATIENT)
Dept: OBGYN CLINIC | Facility: CLINIC | Age: 65
End: 2020-06-08

## 2020-06-09 ENCOUNTER — OFFICE VISIT (OUTPATIENT)
Dept: OBGYN CLINIC | Facility: CLINIC | Age: 65
End: 2020-06-09

## 2020-06-09 VITALS
WEIGHT: 220 LBS | DIASTOLIC BLOOD PRESSURE: 94 MMHG | BODY MASS INDEX: 37.76 KG/M2 | SYSTOLIC BLOOD PRESSURE: 168 MMHG | TEMPERATURE: 97.1 F

## 2020-06-09 DIAGNOSIS — I10 ESSENTIAL HYPERTENSION: ICD-10-CM

## 2020-06-09 DIAGNOSIS — C54.1 ENDOMETRIAL CANCER, GRADE I (HCC): Primary | ICD-10-CM

## 2020-06-09 PROCEDURE — 99213 OFFICE O/P EST LOW 20 MIN: CPT | Performed by: OBSTETRICS & GYNECOLOGY

## 2020-06-09 PROCEDURE — 3060F POS MICROALBUMINURIA REV: CPT | Performed by: OBSTETRICS & GYNECOLOGY

## 2020-06-09 PROCEDURE — 3080F DIAST BP >= 90 MM HG: CPT | Performed by: OBSTETRICS & GYNECOLOGY

## 2020-06-09 PROCEDURE — 1036F TOBACCO NON-USER: CPT | Performed by: OBSTETRICS & GYNECOLOGY

## 2020-06-09 PROCEDURE — 3077F SYST BP >= 140 MM HG: CPT | Performed by: OBSTETRICS & GYNECOLOGY

## 2020-06-09 PROCEDURE — 3044F HG A1C LEVEL LT 7.0%: CPT | Performed by: OBSTETRICS & GYNECOLOGY

## 2020-06-16 ENCOUNTER — CONSULT (OUTPATIENT)
Dept: GYNECOLOGIC ONCOLOGY | Facility: CLINIC | Age: 65
End: 2020-06-16
Payer: COMMERCIAL

## 2020-06-16 VITALS
SYSTOLIC BLOOD PRESSURE: 148 MMHG | DIASTOLIC BLOOD PRESSURE: 88 MMHG | HEIGHT: 64 IN | HEART RATE: 67 BPM | BODY MASS INDEX: 37.56 KG/M2 | TEMPERATURE: 97.8 F | WEIGHT: 220 LBS

## 2020-06-16 DIAGNOSIS — E11.65 TYPE 2 DIABETES MELLITUS WITH HYPERGLYCEMIA, WITHOUT LONG-TERM CURRENT USE OF INSULIN (HCC): ICD-10-CM

## 2020-06-16 DIAGNOSIS — C54.1 ENDOMETRIAL CANCER, GRADE I (HCC): Primary | ICD-10-CM

## 2020-06-16 DIAGNOSIS — Z12.11 COLON CANCER SCREENING: ICD-10-CM

## 2020-06-16 PROBLEM — R93.89 THICKENED ENDOMETRIUM: Status: RESOLVED | Noted: 2020-01-30 | Resolved: 2020-06-16

## 2020-06-16 PROBLEM — Z01.818 PREOPERATIVE CLEARANCE: Status: RESOLVED | Noted: 2020-05-14 | Resolved: 2020-06-16

## 2020-06-16 PROCEDURE — 99215 OFFICE O/P EST HI 40 MIN: CPT | Performed by: OBSTETRICS & GYNECOLOGY

## 2020-06-16 PROCEDURE — 3044F HG A1C LEVEL LT 7.0%: CPT | Performed by: OBSTETRICS & GYNECOLOGY

## 2020-06-16 PROCEDURE — 3066F NEPHROPATHY DOC TX: CPT | Performed by: FAMILY MEDICINE

## 2020-06-16 PROCEDURE — 3066F NEPHROPATHY DOC TX: CPT | Performed by: NURSE PRACTITIONER

## 2020-06-16 RX ORDER — CELECOXIB 200 MG/1
200 CAPSULE ORAL ONCE
Status: CANCELLED | OUTPATIENT
Start: 2020-07-09 | End: 2020-06-16

## 2020-06-16 RX ORDER — HEPARIN SODIUM 5000 [USP'U]/ML
5000 INJECTION, SOLUTION INTRAVENOUS; SUBCUTANEOUS
Status: CANCELLED | OUTPATIENT
Start: 2020-07-09 | End: 2020-07-10

## 2020-06-16 RX ORDER — GABAPENTIN 100 MG/1
200 CAPSULE ORAL ONCE
Status: CANCELLED | OUTPATIENT
Start: 2020-07-09 | End: 2020-06-16

## 2020-06-16 RX ORDER — CEFAZOLIN SODIUM 2 G/50ML
2000 SOLUTION INTRAVENOUS ONCE
Status: CANCELLED | OUTPATIENT
Start: 2020-07-09 | End: 2020-06-16

## 2020-06-16 RX ORDER — ACETAMINOPHEN 325 MG/1
975 TABLET ORAL ONCE
Status: CANCELLED | OUTPATIENT
Start: 2020-07-09 | End: 2020-06-16

## 2020-06-16 NOTE — H&P (VIEW-ONLY)
Assessment/Plan:    Problem List Items Addressed This Visit        Endocrine    Diabetes mellitus ([facility] 75 )       Lab Results   Component Value Date    HGBA1C 6 6 (H) 03/02/2020     I discussed with patient importance of optimal blood sugar control around the time of surgery to decrease complications  Patient verbalized understanding  She will be compliant with medications/diet and physical activity  Genitourinary    Endometrial [facility], grade I ([facility] - Primary     I discussed with patient implications of this diagnosis  I have recommended and she has agreed to proceed with robotic hysterectomy, bilateral pelvic sentinel lymph node biopsies versus lymphadenectomy and all other indicated procedures  Will obtain preoperative testing per procedure pass and obtain COVID test as per protocol  Patient has excellent exercise tolerance and no additional cardiovascular workup is indicated  I discussed with patient indication, risks, benefits and alternatives of surgical exploration  We discussed possibility of bleeding requiring blood transfusion, life-threatening infections requiring additional procedures, injuries to surrounding organs such as bladder, ureters, gastrointestinal tract and or neurovascular structures  Additionally, we discussed general risks associated to stress of surgery such as venous thromboembolism, acute myocardial events and stroke  All questions answered to patient's satisfaction  She agrees and wants to proceed  Informed consent form signed  Relevant Orders    Case request operating room: ROBOTIC TOTAL HYSTERECTOMY, BILATERAL SALPINGO-OOPHORECTOMY, PELVIC SENTINEL LYMPH NODE BIOPSIES VERSUS LYMPHADENECTOMY AND ALL OTHER INDICATED PROCEDURES, DISSECTION LYMPH NODES ABDOMEN PELVIS,LAPAROSCOPIC WITH ROBOTI    (Completed)    HEMOGLOBIN A1C W/ EAG ESTIMATION    PAT Covid Screening    Protime-INR    APTT    Type and screen    CBC and differential    Basic metabolic panel Other    Colon cancer screening     Patient with newly diagnosed endometrial cancer  She would benefit from screening colonoscopy  Reports some mild intermittent constipation  Reportedly, this would not be covered by insurance  I would try again to obtain screening colonoscopy if feasible prior to surgery  She is not at average risk and therefore Cologuard is not adequate screening  CHIEF COMPLAINT:   Here for evaluation and treatment for newly diagnosed endometrial cancer    Patient ID: Prem Carolina is a 72 y o  female  HPI  43-year-old  menopausal  female with anxiety/depression, diabetes, hypertension, hyperlipidemia and morbid obesity  She reports approximately 1 year history of worsening postmenopausal bleeding  Reports bleeding as intermittent, typically mild but occasionally with larger volume/clots  Reports some left pelvic colicky pain which is worse when bleeding occurs  Recent attempt at office endometrial biopsy was unsuccessful, therefore D&C was performed which confirmed the presence of a FIGO grade 1 endometrial cancer  Patient presents today for consultation and treatment recommendations  Last colonoscopy:  Never  Last mammogram:  May 2019  Review of Systems  As above  Twelve point review of systems is otherwise unremarkable    Current Outpatient Medications   Medication Sig Dispense Refill    ARIPiprazole (ABILIFY) 5 mg tablet Take 5 mg by mouth every evening       atorvastatin (LIPITOR) 40 mg tablet TAKE ONE TABLET BY MOUTH ONCE DAILY 30 tablet 2    Blood Pressure KIT by Does not apply route daily 1 each 0    buPROPion (WELLBUTRIN) 75 mg tablet Take 75 mg by mouth every morning       Cholecalciferol (VITAMIN D3) 25 MCG (1000 UT) CAPS Take by mouth      glucose blood (FREESTYLE TEST STRIPS) test strip Test 3xs daily 100 each 2    glucose monitoring kit (FREESTYLE) monitoring kit 1 each by Does not apply route 3 (three) times a day TEST 3XS DAILY 1 each 0    lamoTRIgine (LaMICtal) 100 mg tablet Take 100 mg by mouth daily at bedtime      Lancets (FREESTYLE) lancets Test 3xs daily 100 each 2    lisinopril (ZESTRIL) 20 mg tablet Take 1 tablet (20 mg total) by mouth daily 20 tablet 0    metFORMIN (GLUCOPHAGE) 500 mg tablet TAKE ONE TABLET BY MOUTH TWICE A DAY WITH MEALS 60 tablet 2    metoprolol tartrate (LOPRESSOR) 25 mg tablet Take 1 tablet (25 mg total) by mouth daily 30 tablet 0    topiramate (TOPAMAX) 50 MG tablet Take 50 mg by mouth daily at bedtime      acetaminophen (TYLENOL) 650 mg CR tablet Take 1 tablet (650 mg total) by mouth every 8 (eight) hours as needed for mild pain (Patient not taking: Reported on 6/16/2020) 30 tablet 0    albuterol (PROVENTIL HFA,VENTOLIN HFA) 90 mcg/act inhaler Inhale 2 puffs every 6 (six) hours as needed for wheezing or shortness of breath (Patient not taking: Reported on 6/16/2020) 1 Inhaler 0    hydrochlorothiazide (HYDRODIURIL) 25 mg tablet TAKE ONE TABLET BY MOUTH ONCE DAILY (Patient not taking: Reported on 6/16/2020) 30 tablet 2    misoprostol (CYTOTEC) 200 mcg tablet Take 1 tablet (200 mcg total) by mouth 4 (four) times a day (Patient not taking: Reported on 6/16/2020) 1 tablet 0    omeprazole (PriLOSEC) 20 mg delayed release capsule Take 20 mg by mouth every evening        No current facility-administered medications for this visit          No Known Allergies    Past Medical History:   Diagnosis Date    Anxiety     Asthma     CPAP (continuous positive airway pressure) dependence      states not working now   Brown Depression     [facility] ([facility] )     NIDDM    GERD (gastroesophageal reflux disease)     Glaucoma     Hyperlipidemia     Hypertension     Kidney stone     Obesity     PMB (postmenopausal bleeding)     Sleep apnea     Thickened endometrium     Wears glasses        Past Surgical History:   Procedure Laterality Date    APPENDECTOMY      CHOLECYSTECTOMY      KIDNEY STONE SURGERY      [place] HYSTEROSCOPY,W/ENDO BX N/A 2020    Procedure: DILATATION AND CURETTAGE (D&C) WITH HYSTEROSCOPY;  Surgeon: Lucía Seay MD;  Location: [facility];  Service: Gynecology    TUBAL LIGATION         OB History        9    Para   8    Term   7       1    AB   1    Living   8       SAB   0    TAB   1    Ectopic   0    Multiple        Live Births   8                 No family history on file  The following portions of the patient's history were reviewed and updated as appropriate: allergies, current medications, past family history, past medical history, past social history, past surgical history and problem list       Objective:    Blood pressure 148/88, pulse 67, temperature 97 8 °F (36 6 °C), temperature source Tympanic, height 5' 4" (1 626 m), weight 99 8 kg (220 lb), not currently breastfeeding  Body mass index is 37 76 kg/m²  Physical Exam   Constitutional: She is oriented to person, place, and time  She appears well-developed  No distress  Morbidly obese   Eyes: Right eye exhibits no discharge  Left eye exhibits no discharge  No scleral icterus  Neck: Normal range of motion  Neck supple  No tracheal deviation present  No thyromegaly present  Cardiovascular: Normal rate, regular rhythm and normal heart sounds  Exam reveals no friction rub  No murmur heard  Pulmonary/Chest: She has no rhonchi  Abdominal: Soft  Bowel sounds are normal  She exhibits no distension and no mass  There is no tenderness  There is no guarding  Hernia confirmed negative in the right inguinal area and confirmed negative in the left inguinal area  Obese, no overhanging pannus  Genitourinary: There is no rash or lesion on the right labia  There is no rash or lesion on the left labia  Uterus is not enlarged and not tender  Cervix exhibits no motion tenderness, no discharge and no friability  Right adnexum displays no mass, no tenderness and no fullness   Left adnexum displays no mass, no tenderness and no fullness  No bleeding in the vagina  Genitourinary Comments: Normal external female genitalia  Normal Bartholin's and Gotebo's glands  Normal urethral meatus and no evidence of urethral discharge or masses  Bladder without fullness mass or tenderness  Vagina without lesion or discharge  No significant pelvic organ prolapse noted  Cervix grossly normal appearing without visible lesions  Bimanual exam is limited by body habitus, however there is no evidence of readily appreciable pelvic masses  Anus without fissure of lesion  Lymphadenopathy:     She has no cervical adenopathy  Right: No inguinal adenopathy present  Left: No inguinal adenopathy present  Neurological: She is alert and oriented to person, place, and time  Skin: Skin is warm and dry  She is not diaphoretic  No erythema  No pallor  Psychiatric: She has a normal mood and affect  Vitals reviewed  No results found for:   Lab Results   Component Value Date    WBC 6 40 03/02/2020    HGB 13 2 03/02/2020    HCT 41 3 03/02/2020    MCV 81 03/02/2020     03/02/2020     Lab Results   Component Value Date    K 3 6 03/02/2020     03/02/2020    CO2 29 03/02/2020    BUN 17 03/02/2020    CREATININE 0 77 03/02/2020    GLUF 141 (H) 03/02/2020    CALCIUM 9 9 03/02/2020    AST 32 03/02/2020    ALT 40 03/02/2020    ALKPHOS 72 03/02/2020    EGFR 82 03/02/2020        Trend:  No results found for:     Collected:  5/27/2020  1:40 PM   Status:  Final result   Visible to patient:  No (Inaccessible in [address])   Dx:  Postmenopausal bleeding; Thickened en       Component    Case Report   Surgical Pathology Report                         Case: [id number]                                    Authorizing Provider: Lorrie Love MD          Collected:           05/27/2020 1340               Ordering Location:     [facility]        Received:            05/27/2020 6227                                      [facility]                                                      Pathologist:           Ricardo Tran MD                                                                   Specimens:   A) - Endometrium, endometrial currettage                                                             B) - Endocervical, endocervical currettage                                                 Final Diagnosis   A  Endometrium, Endometrial currettage:  - Endometrial carcinoma, favor endometrioid type, FIGO grade 1       B  Endocervix, Endocervical currettage:  - Endocervical and squamous epithelium   - Lower uterine segment epithelium with tubal metaplasia  - No dysplasia or cancer is identified  Electronically signed by Ricardo Tran MD on 5/29/2020 at  1:20 PM   Note    Dr Hilda Aggarwal was notified of the results via [address] by Dr Yimi Yanes on 5/29/2020 at 1:02 pm      Intradepartmental consultation is in agreement with Dr Rema Huntley    Additional Information    All reported additional testing was performed with appropriately reactive controls   These tests were developed and their performance characteristics determined by [address] [place] [address] or appropriate performing facility, though some tests may be performed on tissues which have not been validated for performance characteristics (such as staining performed on alcohol exposed cell blocks and decalcified tissues)   Results should be interpreted with caution and in the context of the patients clinical condition  These tests may not be cleared or approved by the [place]  Food and Drug Administration, though the FDA has determined that such clearance or approval is not necessary  These tests are used for clinical purposes and they should not be regarded as investigational or for research  This laboratory has been approved by CLIA 88, designated as a high-complexity laboratory and is qualified to perform these tests  Gross Description    A   The specimen is received in formalin, labeled with the patient's name and hospital number, and is designated "endometrial curettings  The specimen consists of multiple tan pink to brown fibromembranous, mucinous friable irregularly-shaped soft tissue fragments measuring in aggregate of 3 9 x 3 0 x 0 4 cm  The specimen is drained into embedding bags  Entirely submitted  Two cassettes      B  The specimen is received in formalin, labeled with the patient's name and hospital number, and is designated "endocervical curettings  The specimen consists of multiple tan brown fibromembranous, mucinous, focally hemorrhagic soft tissue fragments measuring in aggregate of 2 6 x 1 2 x 0 2 cm  The specimen is drained into an embedding bag  Entirely submitted   One cassette        Note: The estimated total formalin fixation time based upon information provided by the submitting clinician and the standard processing schedule is under 72 hours      Teresa Leija MD, [address] [place] [address]  6/16/2020  10:34 AM

## 2020-06-25 ENCOUNTER — HOSPITAL ENCOUNTER (EMERGENCY)
Facility: HOSPITAL | Age: 65
Discharge: HOME | End: 2020-06-25
Attending: EMERGENCY MEDICINE | Admitting: EMERGENCY MEDICINE
Payer: COMMERCIAL

## 2020-06-25 ENCOUNTER — APPOINTMENT (OUTPATIENT)
Dept: LAB | Facility: MEDICAL CENTER | Age: 65
End: 2020-06-25
Payer: COMMERCIAL

## 2020-06-25 ENCOUNTER — APPOINTMENT (EMERGENCY)
Dept: CT IMAGING | Facility: HOSPITAL | Age: 65
End: 2020-06-25
Payer: COMMERCIAL

## 2020-06-25 VITALS
RESPIRATION RATE: 20 BRPM | HEART RATE: 83 BPM | TEMPERATURE: 96.3 F | DIASTOLIC BLOOD PRESSURE: 79 MMHG | BODY MASS INDEX: 37.65 KG/M2 | WEIGHT: 219.36 LBS | OXYGEN SATURATION: 98 % | SYSTOLIC BLOOD PRESSURE: 155 MMHG

## 2020-06-25 DIAGNOSIS — M54.50 ACUTE LOW BACK PAIN: Primary | ICD-10-CM

## 2020-06-25 DIAGNOSIS — C54.1 ENDOMETRIAL CANCER, GRADE I (HCC): ICD-10-CM

## 2020-06-25 LAB
ABO GROUP BLD: NORMAL
ANION GAP SERPL CALCULATED.3IONS-SCNC: 5 MMOL/L (ref 4–13)
ANION GAP SERPL CALCULATED.3IONS-SCNC: 8 MMOL/L (ref 4–13)
APTT PPP: 27 SECONDS (ref 23–37)
BASOPHILS # BLD AUTO: 0.08 THOUSANDS/ΜL (ref 0–0.1)
BASOPHILS # BLD AUTO: 0.09 THOUSANDS/ΜL (ref 0–0.1)
BASOPHILS NFR BLD AUTO: 1 % (ref 0–1)
BASOPHILS NFR BLD AUTO: 1 % (ref 0–1)
BLD GP AB SCN SERPL QL: NEGATIVE
BUN SERPL-MCNC: 17 MG/DL (ref 5–25)
BUN SERPL-MCNC: 19 MG/DL (ref 5–25)
CALCIUM SERPL-MCNC: 10 MG/DL (ref 8.3–10.1)
CALCIUM SERPL-MCNC: 9.6 MG/DL (ref 8.3–10.1)
CHLORIDE SERPL-SCNC: 104 MMOL/L (ref 100–108)
CHLORIDE SERPL-SCNC: 106 MMOL/L (ref 100–108)
CO2 SERPL-SCNC: 27 MMOL/L (ref 21–32)
CO2 SERPL-SCNC: 27 MMOL/L (ref 21–32)
CREAT SERPL-MCNC: 1.05 MG/DL (ref 0.6–1.3)
CREAT SERPL-MCNC: 1.37 MG/DL (ref 0.6–1.3)
EOSINOPHIL # BLD AUTO: 0.17 THOUSAND/ΜL (ref 0–0.61)
EOSINOPHIL # BLD AUTO: 0.19 THOUSAND/ΜL (ref 0–0.61)
EOSINOPHIL NFR BLD AUTO: 2 % (ref 0–6)
EOSINOPHIL NFR BLD AUTO: 2 % (ref 0–6)
ERYTHROCYTE [DISTWIDTH] IN BLOOD BY AUTOMATED COUNT: 14.4 % (ref 11.6–15.1)
ERYTHROCYTE [DISTWIDTH] IN BLOOD BY AUTOMATED COUNT: 14.6 % (ref 11.6–15.1)
EST. AVERAGE GLUCOSE BLD GHB EST-MCNC: 151 MG/DL
GFR SERPL CREATININE-BSD FRML MDRD: 41 ML/MIN/1.73SQ M
GFR SERPL CREATININE-BSD FRML MDRD: 56 ML/MIN/1.73SQ M
GLUCOSE P FAST SERPL-MCNC: 155 MG/DL (ref 65–99)
GLUCOSE SERPL-MCNC: 122 MG/DL (ref 65–140)
HBA1C MFR BLD: 6.9 %
HCT VFR BLD AUTO: 42.8 % (ref 34.8–46.1)
HCT VFR BLD AUTO: 43.7 % (ref 34.8–46.1)
HGB BLD-MCNC: 13.2 G/DL (ref 11.5–15.4)
HGB BLD-MCNC: 13.6 G/DL (ref 11.5–15.4)
IMM GRANULOCYTES # BLD AUTO: 0.02 THOUSAND/UL (ref 0–0.2)
IMM GRANULOCYTES # BLD AUTO: 0.02 THOUSAND/UL (ref 0–0.2)
IMM GRANULOCYTES NFR BLD AUTO: 0 % (ref 0–2)
IMM GRANULOCYTES NFR BLD AUTO: 0 % (ref 0–2)
INR PPP: 0.9 (ref 0.84–1.19)
LYMPHOCYTES # BLD AUTO: 2.88 THOUSANDS/ΜL (ref 0.6–4.47)
LYMPHOCYTES # BLD AUTO: 3.14 THOUSANDS/ΜL (ref 0.6–4.47)
LYMPHOCYTES NFR BLD AUTO: 33 % (ref 14–44)
LYMPHOCYTES NFR BLD AUTO: 35 % (ref 14–44)
MCH RBC QN AUTO: 26.2 PG (ref 26.8–34.3)
MCH RBC QN AUTO: 26.6 PG (ref 26.8–34.3)
MCHC RBC AUTO-ENTMCNC: 30.8 G/DL (ref 31.4–37.4)
MCHC RBC AUTO-ENTMCNC: 31.1 G/DL (ref 31.4–37.4)
MCV RBC AUTO: 85 FL (ref 82–98)
MCV RBC AUTO: 86 FL (ref 82–98)
MONOCYTES # BLD AUTO: 0.62 THOUSAND/ΜL (ref 0.17–1.22)
MONOCYTES # BLD AUTO: 0.7 THOUSAND/ΜL (ref 0.17–1.22)
MONOCYTES NFR BLD AUTO: 7 % (ref 4–12)
MONOCYTES NFR BLD AUTO: 8 % (ref 4–12)
NEUTROPHILS # BLD AUTO: 4.43 THOUSANDS/ΜL (ref 1.85–7.62)
NEUTROPHILS # BLD AUTO: 5.48 THOUSANDS/ΜL (ref 1.85–7.62)
NEUTS SEG NFR BLD AUTO: 54 % (ref 43–75)
NEUTS SEG NFR BLD AUTO: 57 % (ref 43–75)
NRBC BLD AUTO-RTO: 0 /100 WBCS
NRBC BLD AUTO-RTO: 0 /100 WBCS
PLATELET # BLD AUTO: 273 THOUSANDS/UL (ref 149–390)
PLATELET # BLD AUTO: 309 THOUSANDS/UL (ref 149–390)
PMV BLD AUTO: 11.7 FL (ref 8.9–12.7)
PMV BLD AUTO: 12.2 FL (ref 8.9–12.7)
POTASSIUM SERPL-SCNC: 3.9 MMOL/L (ref 3.5–5.3)
POTASSIUM SERPL-SCNC: 4 MMOL/L (ref 3.5–5.3)
PROTHROMBIN TIME: 12.1 SECONDS (ref 11.6–14.5)
RBC # BLD AUTO: 5.03 MILLION/UL (ref 3.81–5.12)
RBC # BLD AUTO: 5.11 MILLION/UL (ref 3.81–5.12)
RH BLD: POSITIVE
SODIUM SERPL-SCNC: 138 MMOL/L (ref 136–145)
SODIUM SERPL-SCNC: 139 MMOL/L (ref 136–145)
SPECIMEN EXPIRATION DATE: NORMAL
WBC # BLD AUTO: 8.3 THOUSAND/UL (ref 4.31–10.16)
WBC # BLD AUTO: 9.52 THOUSAND/UL (ref 4.31–10.16)

## 2020-06-25 PROCEDURE — 3044F HG A1C LEVEL LT 7.0%: CPT | Performed by: NURSE PRACTITIONER

## 2020-06-25 PROCEDURE — 99284 EMERGENCY DEPT VISIT MOD MDM: CPT

## 2020-06-25 PROCEDURE — 85730 THROMBOPLASTIN TIME PARTIAL: CPT

## 2020-06-25 PROCEDURE — 74176 CT ABD & PELVIS W/O CONTRAST: CPT

## 2020-06-25 PROCEDURE — 85610 PROTHROMBIN TIME: CPT

## 2020-06-25 PROCEDURE — 80048 BASIC METABOLIC PNL TOTAL CA: CPT

## 2020-06-25 PROCEDURE — 99284 EMERGENCY DEPT VISIT MOD MDM: CPT | Performed by: EMERGENCY MEDICINE

## 2020-06-25 PROCEDURE — 86901 BLOOD TYPING SEROLOGIC RH(D): CPT

## 2020-06-25 PROCEDURE — 85025 COMPLETE CBC W/AUTO DIFF WBC: CPT

## 2020-06-25 PROCEDURE — 86850 RBC ANTIBODY SCREEN: CPT

## 2020-06-25 PROCEDURE — 86900 BLOOD TYPING SEROLOGIC ABO: CPT

## 2020-06-25 PROCEDURE — 83036 HEMOGLOBIN GLYCOSYLATED A1C: CPT

## 2020-06-25 PROCEDURE — 36415 COLL VENOUS BLD VENIPUNCTURE: CPT

## 2020-06-25 PROCEDURE — 80048 BASIC METABOLIC PNL TOTAL CA: CPT | Performed by: EMERGENCY MEDICINE

## 2020-06-25 PROCEDURE — 85025 COMPLETE CBC W/AUTO DIFF WBC: CPT | Performed by: EMERGENCY MEDICINE

## 2020-06-25 PROCEDURE — 96374 THER/PROPH/DIAG INJ IV PUSH: CPT

## 2020-06-25 PROCEDURE — 3044F HG A1C LEVEL LT 7.0%: CPT | Performed by: FAMILY MEDICINE

## 2020-06-25 RX ORDER — KETOROLAC TROMETHAMINE 30 MG/ML
15 INJECTION, SOLUTION INTRAMUSCULAR; INTRAVENOUS ONCE
Status: COMPLETED | OUTPATIENT
Start: 2020-06-25 | End: 2020-06-25

## 2020-06-25 RX ADMIN — KETOROLAC TROMETHAMINE 15 MG: 30 INJECTION, SOLUTION INTRAMUSCULAR at 21:14

## 2020-06-29 ENCOUNTER — OFFICE VISIT (OUTPATIENT)
Dept: GASTROENTEROLOGY | Facility: MEDICAL CENTER | Age: 65
End: 2020-06-29
Payer: COMMERCIAL

## 2020-06-29 VITALS
TEMPERATURE: 97.6 F | HEART RATE: 75 BPM | BODY MASS INDEX: 37.9 KG/M2 | WEIGHT: 220.8 LBS | DIASTOLIC BLOOD PRESSURE: 72 MMHG | SYSTOLIC BLOOD PRESSURE: 136 MMHG

## 2020-06-29 DIAGNOSIS — K21.9 GASTROESOPHAGEAL REFLUX DISEASE WITHOUT ESOPHAGITIS: Primary | ICD-10-CM

## 2020-06-29 DIAGNOSIS — R13.19 ESOPHAGEAL DYSPHAGIA: ICD-10-CM

## 2020-06-29 DIAGNOSIS — Z11.59 ENCOUNTER FOR SCREENING FOR OTHER VIRAL DISEASES: ICD-10-CM

## 2020-06-29 DIAGNOSIS — Z12.11 COLON CANCER SCREENING: ICD-10-CM

## 2020-06-29 DIAGNOSIS — K59.04 CHRONIC IDIOPATHIC CONSTIPATION: ICD-10-CM

## 2020-06-29 PROCEDURE — 99204 OFFICE O/P NEW MOD 45 MIN: CPT | Performed by: INTERNAL MEDICINE

## 2020-06-29 PROCEDURE — 3044F HG A1C LEVEL LT 7.0%: CPT | Performed by: INTERNAL MEDICINE

## 2020-06-29 RX ORDER — OMEPRAZOLE 20 MG/1
40 CAPSULE, DELAYED RELEASE ORAL EVERY EVENING
Qty: 60 CAPSULE | Refills: 3 | Status: SHIPPED | OUTPATIENT
Start: 2020-06-29 | End: 2020-07-28 | Stop reason: SDUPTHER

## 2020-06-29 RX ORDER — POLYETHYLENE GLYCOL 3350 17 G/17G
POWDER, FOR SOLUTION ORAL
Qty: 238 G | Refills: 0 | Status: SHIPPED | OUTPATIENT
Start: 2020-06-29 | End: 2020-07-28 | Stop reason: ALTCHOICE

## 2020-06-30 ENCOUNTER — ANESTHESIA EVENT (OUTPATIENT)
Dept: GASTROENTEROLOGY | Facility: MEDICAL CENTER | Age: 65
End: 2020-06-30

## 2020-07-03 DIAGNOSIS — C54.1 ENDOMETRIAL CANCER, GRADE I (HCC): ICD-10-CM

## 2020-07-03 PROCEDURE — U0003 INFECTIOUS AGENT DETECTION BY NUCLEIC ACID (DNA OR RNA); SEVERE ACUTE RESPIRATORY SYNDROME CORONAVIRUS 2 (SARS-COV-2) (CORONAVIRUS DISEASE [COVID-19]), AMPLIFIED PROBE TECHNIQUE, MAKING USE OF HIGH THROUGHPUT TECHNOLOGIES AS DESCRIBED BY CMS-2020-01-R: HCPCS

## 2020-07-08 ENCOUNTER — ANESTHESIA EVENT (OUTPATIENT)
Dept: PERIOP | Facility: HOSPITAL | Age: 65
End: 2020-07-08
Payer: COMMERCIAL

## 2020-07-08 NOTE — PRE-PROCEDURE INSTRUCTIONS
Pre-Surgery Instructions:   Medication Instructions    albuterol (PROVENTIL HFA,VENTOLIN HFA) 90 mcg/act inhaler Instructed patient per Anesthesia Guidelines   ARIPiprazole (ABILIFY) 5 mg tablet Instructed patient per Anesthesia Guidelines   atorvastatin (LIPITOR) 40 mg tablet Instructed patient per Anesthesia Guidelines   buPROPion (WELLBUTRIN) 75 mg tablet Instructed patient per Anesthesia Guidelines   Cholecalciferol (VITAMIN D3) 25 MCG (1000 UT) CAPS Instructed patient per Anesthesia Guidelines   hydrochlorothiazide (HYDRODIURIL) 25 mg tablet Instructed patient per Anesthesia Guidelines   lamoTRIgine (LaMICtal) 100 mg tablet Instructed patient per Anesthesia Guidelines   lisinopril (ZESTRIL) 20 mg tablet Instructed patient per Anesthesia Guidelines   metFORMIN (GLUCOPHAGE) 500 mg tablet Instructed patient per Anesthesia Guidelines   metoprolol tartrate (LOPRESSOR) 25 mg tablet Instructed patient per Anesthesia Guidelines   omeprazole (PriLOSEC) 20 mg delayed release capsule Instructed patient per Anesthesia Guidelines   polyethylene glycol (GLYCOLAX) 17 GM/SCOOP powder Instructed patient per Anesthesia Guidelines   topiramate (TOPAMAX) 50 MG tablet Instructed patient per Anesthesia Guidelines  Pre-op and bathing instructions given through [place] interpretor  Patient advised to hold lisinopril, metformin and hydrochlorothiazide on morning of surgery  Patient has hibiclens

## 2020-07-09 ENCOUNTER — ANESTHESIA (OUTPATIENT)
Dept: PERIOP | Facility: HOSPITAL | Age: 65
End: 2020-07-09
Payer: COMMERCIAL

## 2020-07-09 ENCOUNTER — TELEPHONE (OUTPATIENT)
Dept: OTHER | Facility: HOSPITAL | Age: 65
End: 2020-07-09

## 2020-07-09 ENCOUNTER — HOSPITAL ENCOUNTER (OUTPATIENT)
Facility: HOSPITAL | Age: 65
Setting detail: OUTPATIENT SURGERY
Discharge: HOME | End: 2020-07-10
Attending: OBSTETRICS & GYNECOLOGY | Admitting: OBSTETRICS & GYNECOLOGY
Payer: COMMERCIAL

## 2020-07-09 ENCOUNTER — APPOINTMENT (OUTPATIENT)
Dept: RADIOLOGY | Facility: HOSPITAL | Age: 65
End: 2020-07-09
Payer: COMMERCIAL

## 2020-07-09 DIAGNOSIS — Z90.710 S/P HYSTERECTOMY WITH OOPHORECTOMY: Primary | ICD-10-CM

## 2020-07-09 DIAGNOSIS — C54.1 ENDOMETRIAL CANCER, GRADE I (HCC): ICD-10-CM

## 2020-07-09 DIAGNOSIS — I10 HYPERTENSION, UNSPECIFIED TYPE: ICD-10-CM

## 2020-07-09 DIAGNOSIS — N17.9 ACUTE KIDNEY INJURY (HCC): ICD-10-CM

## 2020-07-09 DIAGNOSIS — I10 ESSENTIAL HYPERTENSION: Chronic | ICD-10-CM

## 2020-07-09 DIAGNOSIS — Z90.721 S/P HYSTERECTOMY WITH OOPHORECTOMY: Primary | ICD-10-CM

## 2020-07-09 DIAGNOSIS — N20.0 NEPHROLITHIASIS: ICD-10-CM

## 2020-07-09 PROBLEM — N18.2 CKD (CHRONIC KIDNEY DISEASE) STAGE 2, GFR 60-89 ML/MIN: Status: ACTIVE | Noted: 2020-07-09

## 2020-07-09 LAB
ABO GROUP BLD: NORMAL
ANION GAP SERPL CALCULATED.3IONS-SCNC: 10 MMOL/L (ref 4–13)
ANION GAP SERPL CALCULATED.3IONS-SCNC: 8 MMOL/L (ref 4–13)
BUN SERPL-MCNC: 18 MG/DL (ref 5–25)
BUN SERPL-MCNC: 20 MG/DL (ref 5–25)
CALCIUM SERPL-MCNC: 8.2 MG/DL (ref 8.3–10.1)
CALCIUM SERPL-MCNC: 8.8 MG/DL (ref 8.3–10.1)
CHLORIDE SERPL-SCNC: 104 MMOL/L (ref 100–108)
CHLORIDE SERPL-SCNC: 105 MMOL/L (ref 100–108)
CO2 SERPL-SCNC: 24 MMOL/L (ref 21–32)
CO2 SERPL-SCNC: 25 MMOL/L (ref 21–32)
CREAT SERPL-MCNC: 1.22 MG/DL (ref 0.6–1.3)
CREAT SERPL-MCNC: 1.31 MG/DL (ref 0.6–1.3)
ERYTHROCYTE [DISTWIDTH] IN BLOOD BY AUTOMATED COUNT: 14.3 % (ref 11.6–15.1)
GFR SERPL CREATININE-BSD FRML MDRD: 43 ML/MIN/1.73SQ M
GFR SERPL CREATININE-BSD FRML MDRD: 47 ML/MIN/1.73SQ M
GLUCOSE P FAST SERPL-MCNC: 149 MG/DL (ref 65–99)
GLUCOSE P FAST SERPL-MCNC: 203 MG/DL (ref 65–99)
GLUCOSE SERPL-MCNC: 132 MG/DL (ref 65–140)
GLUCOSE SERPL-MCNC: 143 MG/DL (ref 65–140)
GLUCOSE SERPL-MCNC: 149 MG/DL (ref 65–140)
GLUCOSE SERPL-MCNC: 158 MG/DL (ref 65–140)
GLUCOSE SERPL-MCNC: 190 MG/DL (ref 65–140)
GLUCOSE SERPL-MCNC: 203 MG/DL (ref 65–140)
HCT VFR BLD AUTO: 38.4 % (ref 34.8–46.1)
HGB BLD-MCNC: 11.6 G/DL (ref 11.5–15.4)
MCH RBC QN AUTO: 26.4 PG (ref 26.8–34.3)
MCHC RBC AUTO-ENTMCNC: 30.2 G/DL (ref 31.4–37.4)
MCV RBC AUTO: 88 FL (ref 82–98)
PLATELET # BLD AUTO: 262 THOUSANDS/UL (ref 149–390)
PMV BLD AUTO: 11.4 FL (ref 8.9–12.7)
POTASSIUM SERPL-SCNC: 3.5 MMOL/L (ref 3.5–5.3)
POTASSIUM SERPL-SCNC: 3.6 MMOL/L (ref 3.5–5.3)
RBC # BLD AUTO: 4.39 MILLION/UL (ref 3.81–5.12)
RH BLD: POSITIVE
SARS-COV-2 RNA RESP QL NAA+PROBE: NEGATIVE
SARS-COV-2 RNA SPEC QL NAA+PROBE: NOT DETECTED
SODIUM SERPL-SCNC: 138 MMOL/L (ref 136–145)
SODIUM SERPL-SCNC: 138 MMOL/L (ref 136–145)
WBC # BLD AUTO: 10.6 THOUSAND/UL (ref 4.31–10.16)

## 2020-07-09 PROCEDURE — 88305 TISSUE EXAM BY PATHOLOGIST: CPT | Performed by: PATHOLOGY

## 2020-07-09 PROCEDURE — 80048 BASIC METABOLIC PNL TOTAL CA: CPT | Performed by: OBSTETRICS & GYNECOLOGY

## 2020-07-09 PROCEDURE — 88112 CYTOPATH CELL ENHANCE TECH: CPT | Performed by: PATHOLOGY

## 2020-07-09 PROCEDURE — 58571 TLH W/T/O 250 G OR LESS: CPT | Performed by: OBSTETRICS & GYNECOLOGY

## 2020-07-09 PROCEDURE — 88341 IMHCHEM/IMCYTCHM EA ADD ANTB: CPT | Performed by: PATHOLOGY

## 2020-07-09 PROCEDURE — 88309 TISSUE EXAM BY PATHOLOGIST: CPT | Performed by: PATHOLOGY

## 2020-07-09 PROCEDURE — 87635 SARS-COV-2 COVID-19 AMP PRB: CPT | Performed by: OBSTETRICS & GYNECOLOGY

## 2020-07-09 PROCEDURE — 99024 POSTOP FOLLOW-UP VISIT: CPT | Performed by: OBSTETRICS & GYNECOLOGY

## 2020-07-09 PROCEDURE — 99204 OFFICE O/P NEW MOD 45 MIN: CPT | Performed by: PHYSICIAN ASSISTANT

## 2020-07-09 PROCEDURE — 85027 COMPLETE CBC AUTOMATED: CPT | Performed by: OBSTETRICS & GYNECOLOGY

## 2020-07-09 PROCEDURE — 99205 OFFICE O/P NEW HI 60 MIN: CPT | Performed by: INTERNAL MEDICINE

## 2020-07-09 PROCEDURE — 38900 IO MAP OF SENT LYMPH NODE: CPT | Performed by: OBSTETRICS & GYNECOLOGY

## 2020-07-09 PROCEDURE — 88342 IMHCHEM/IMCYTCHM 1ST ANTB: CPT | Performed by: PATHOLOGY

## 2020-07-09 PROCEDURE — 88307 TISSUE EXAM BY PATHOLOGIST: CPT | Performed by: PATHOLOGY

## 2020-07-09 PROCEDURE — 38570 LAPAROSCOPY LYMPH NODE BIOP: CPT | Performed by: OBSTETRICS & GYNECOLOGY

## 2020-07-09 PROCEDURE — C1769 GUIDE WIRE: HCPCS | Performed by: OBSTETRICS & GYNECOLOGY

## 2020-07-09 PROCEDURE — 82948 REAGENT STRIP/BLOOD GLUCOSE: CPT

## 2020-07-09 RX ORDER — HYDROMORPHONE HCL/PF 1 MG/ML
SYRINGE (ML) INJECTION AS NEEDED
Status: DISCONTINUED | OUTPATIENT
Start: 2020-07-09 | End: 2020-07-09 | Stop reason: SURG

## 2020-07-09 RX ORDER — FENTANYL CITRATE/PF 50 MCG/ML
25 SYRINGE (ML) INJECTION
Status: DISCONTINUED | OUTPATIENT
Start: 2020-07-09 | End: 2020-07-09 | Stop reason: HOSPADM

## 2020-07-09 RX ORDER — ACETAMINOPHEN 325 MG/1
975 TABLET ORAL ONCE
Status: COMPLETED | OUTPATIENT
Start: 2020-07-09 | End: 2020-07-09

## 2020-07-09 RX ORDER — HYDROMORPHONE HCL/PF 1 MG/ML
0.5 SYRINGE (ML) INJECTION
Status: DISCONTINUED | OUTPATIENT
Start: 2020-07-09 | End: 2020-07-09 | Stop reason: HOSPADM

## 2020-07-09 RX ORDER — NEOSTIGMINE METHYLSULFATE 1 MG/ML
INJECTION INTRAVENOUS AS NEEDED
Status: DISCONTINUED | OUTPATIENT
Start: 2020-07-09 | End: 2020-07-09 | Stop reason: SURG

## 2020-07-09 RX ORDER — SODIUM CHLORIDE 9 MG/ML
INJECTION, SOLUTION INTRAVENOUS AS NEEDED
Status: DISCONTINUED | OUTPATIENT
Start: 2020-07-09 | End: 2020-07-09 | Stop reason: HOSPADM

## 2020-07-09 RX ORDER — SENNOSIDES 8.6 MG
1 TABLET ORAL
Status: DISCONTINUED | OUTPATIENT
Start: 2020-07-09 | End: 2020-07-10 | Stop reason: HOSPADM

## 2020-07-09 RX ORDER — MIDAZOLAM HYDROCHLORIDE 2 MG/2ML
INJECTION, SOLUTION INTRAMUSCULAR; INTRAVENOUS AS NEEDED
Status: DISCONTINUED | OUTPATIENT
Start: 2020-07-09 | End: 2020-07-09 | Stop reason: SURG

## 2020-07-09 RX ORDER — ONDANSETRON 2 MG/ML
4 INJECTION INTRAMUSCULAR; INTRAVENOUS EVERY 6 HOURS PRN
Status: DISCONTINUED | OUTPATIENT
Start: 2020-07-09 | End: 2020-07-10 | Stop reason: HOSPADM

## 2020-07-09 RX ORDER — GLYCOPYRROLATE 0.2 MG/ML
INJECTION INTRAMUSCULAR; INTRAVENOUS AS NEEDED
Status: DISCONTINUED | OUTPATIENT
Start: 2020-07-09 | End: 2020-07-09 | Stop reason: SURG

## 2020-07-09 RX ORDER — NALOXONE HYDROCHLORIDE 0.4 MG/ML
0.1 INJECTION, SOLUTION INTRAMUSCULAR; INTRAVENOUS; SUBCUTANEOUS
Status: DISCONTINUED | OUTPATIENT
Start: 2020-07-09 | End: 2020-07-09 | Stop reason: HOSPADM

## 2020-07-09 RX ORDER — OXYCODONE HYDROCHLORIDE 5 MG/1
5 TABLET ORAL EVERY 6 HOURS PRN
Qty: 5 TABLET | Refills: 0 | Status: SHIPPED | OUTPATIENT
Start: 2020-07-09 | End: 2020-07-14 | Stop reason: SDUPTHER

## 2020-07-09 RX ORDER — HYDROMORPHONE HCL/PF 1 MG/ML
0.2 SYRINGE (ML) INJECTION EVERY 4 HOURS PRN
Status: DISCONTINUED | OUTPATIENT
Start: 2020-07-09 | End: 2020-07-10 | Stop reason: HOSPADM

## 2020-07-09 RX ORDER — GABAPENTIN 100 MG/1
200 CAPSULE ORAL ONCE
Status: COMPLETED | OUTPATIENT
Start: 2020-07-09 | End: 2020-07-09

## 2020-07-09 RX ORDER — LIDOCAINE HYDROCHLORIDE 20 MG/ML
INJECTION, SOLUTION EPIDURAL; INFILTRATION; INTRACAUDAL; PERINEURAL AS NEEDED
Status: DISCONTINUED | OUTPATIENT
Start: 2020-07-09 | End: 2020-07-09 | Stop reason: SURG

## 2020-07-09 RX ORDER — OXYCODONE HYDROCHLORIDE 10 MG/1
10 TABLET ORAL EVERY 4 HOURS PRN
Status: DISCONTINUED | OUTPATIENT
Start: 2020-07-09 | End: 2020-07-10 | Stop reason: HOSPADM

## 2020-07-09 RX ORDER — INDOCYANINE GREEN AND WATER 25 MG
KIT INJECTION AS NEEDED
Status: DISCONTINUED | OUTPATIENT
Start: 2020-07-09 | End: 2020-07-09 | Stop reason: HOSPADM

## 2020-07-09 RX ORDER — ROCURONIUM BROMIDE 10 MG/ML
INJECTION, SOLUTION INTRAVENOUS AS NEEDED
Status: DISCONTINUED | OUTPATIENT
Start: 2020-07-09 | End: 2020-07-09 | Stop reason: SURG

## 2020-07-09 RX ORDER — ALBUTEROL SULFATE 90 UG/1
2 AEROSOL, METERED RESPIRATORY (INHALATION) EVERY 6 HOURS PRN
Status: DISCONTINUED | OUTPATIENT
Start: 2020-07-09 | End: 2020-07-10 | Stop reason: HOSPADM

## 2020-07-09 RX ORDER — SODIUM CHLORIDE, SODIUM GLUCONATE, SODIUM ACETATE, POTASSIUM CHLORIDE, MAGNESIUM CHLORIDE, SODIUM PHOSPHATE, DIBASIC, AND POTASSIUM PHOSPHATE .53; .5; .37; .037; .03; .012; .00082 G/100ML; G/100ML; G/100ML; G/100ML; G/100ML; G/100ML; G/100ML
125 INJECTION, SOLUTION INTRAVENOUS CONTINUOUS
Status: DISCONTINUED | OUTPATIENT
Start: 2020-07-09 | End: 2020-07-10 | Stop reason: HOSPADM

## 2020-07-09 RX ORDER — BUPROPION HYDROCHLORIDE 75 MG/1
75 TABLET ORAL EVERY MORNING
Status: DISCONTINUED | OUTPATIENT
Start: 2020-07-10 | End: 2020-07-10 | Stop reason: HOSPADM

## 2020-07-09 RX ORDER — SODIUM CHLORIDE, SODIUM LACTATE, POTASSIUM CHLORIDE, CALCIUM CHLORIDE 600; 310; 30; 20 MG/100ML; MG/100ML; MG/100ML; MG/100ML
125 INJECTION, SOLUTION INTRAVENOUS CONTINUOUS
Status: DISCONTINUED | OUTPATIENT
Start: 2020-07-09 | End: 2020-07-09

## 2020-07-09 RX ORDER — PANTOPRAZOLE SODIUM 40 MG/1
40 TABLET, DELAYED RELEASE ORAL EVERY EVENING
Status: DISCONTINUED | OUTPATIENT
Start: 2020-07-09 | End: 2020-07-10 | Stop reason: HOSPADM

## 2020-07-09 RX ORDER — PROPOFOL 10 MG/ML
INJECTION, EMULSION INTRAVENOUS AS NEEDED
Status: DISCONTINUED | OUTPATIENT
Start: 2020-07-09 | End: 2020-07-09 | Stop reason: SURG

## 2020-07-09 RX ORDER — EPHEDRINE SULFATE 50 MG/ML
INJECTION INTRAVENOUS AS NEEDED
Status: DISCONTINUED | OUTPATIENT
Start: 2020-07-09 | End: 2020-07-09 | Stop reason: SURG

## 2020-07-09 RX ORDER — DEXAMETHASONE SODIUM PHOSPHATE 4 MG/ML
INJECTION, SOLUTION INTRA-ARTICULAR; INTRALESIONAL; INTRAMUSCULAR; INTRAVENOUS; SOFT TISSUE AS NEEDED
Status: DISCONTINUED | OUTPATIENT
Start: 2020-07-09 | End: 2020-07-09 | Stop reason: SURG

## 2020-07-09 RX ORDER — ARIPIPRAZOLE 5 MG/1
5 TABLET ORAL EVERY EVENING
Status: DISCONTINUED | OUTPATIENT
Start: 2020-07-09 | End: 2020-07-10 | Stop reason: HOSPADM

## 2020-07-09 RX ORDER — FENTANYL CITRATE 50 UG/ML
INJECTION, SOLUTION INTRAMUSCULAR; INTRAVENOUS AS NEEDED
Status: DISCONTINUED | OUTPATIENT
Start: 2020-07-09 | End: 2020-07-09 | Stop reason: SURG

## 2020-07-09 RX ORDER — BUPIVACAINE HYDROCHLORIDE 2.5 MG/ML
INJECTION, SOLUTION EPIDURAL; INFILTRATION; INTRACAUDAL AS NEEDED
Status: DISCONTINUED | OUTPATIENT
Start: 2020-07-09 | End: 2020-07-09 | Stop reason: HOSPADM

## 2020-07-09 RX ORDER — ONDANSETRON 2 MG/ML
4 INJECTION INTRAMUSCULAR; INTRAVENOUS ONCE AS NEEDED
Status: DISCONTINUED | OUTPATIENT
Start: 2020-07-09 | End: 2020-07-09 | Stop reason: HOSPADM

## 2020-07-09 RX ORDER — LAMOTRIGINE 100 MG/1
100 TABLET ORAL
Status: DISCONTINUED | OUTPATIENT
Start: 2020-07-09 | End: 2020-07-10 | Stop reason: HOSPADM

## 2020-07-09 RX ORDER — HEPARIN SODIUM 5000 [USP'U]/ML
5000 INJECTION, SOLUTION INTRAVENOUS; SUBCUTANEOUS
Status: COMPLETED | OUTPATIENT
Start: 2020-07-09 | End: 2020-07-09

## 2020-07-09 RX ORDER — MEPERIDINE HYDROCHLORIDE 25 MG/ML
12.5 INJECTION INTRAMUSCULAR; INTRAVENOUS; SUBCUTANEOUS
Status: DISCONTINUED | OUTPATIENT
Start: 2020-07-09 | End: 2020-07-09 | Stop reason: HOSPADM

## 2020-07-09 RX ORDER — CEFAZOLIN SODIUM 2 G/50ML
2000 SOLUTION INTRAVENOUS ONCE
Status: COMPLETED | OUTPATIENT
Start: 2020-07-09 | End: 2020-07-09

## 2020-07-09 RX ORDER — TOPIRAMATE 25 MG/1
50 TABLET ORAL
Status: DISCONTINUED | OUTPATIENT
Start: 2020-07-09 | End: 2020-07-10 | Stop reason: HOSPADM

## 2020-07-09 RX ORDER — ONDANSETRON 2 MG/ML
INJECTION INTRAMUSCULAR; INTRAVENOUS AS NEEDED
Status: DISCONTINUED | OUTPATIENT
Start: 2020-07-09 | End: 2020-07-09 | Stop reason: SURG

## 2020-07-09 RX ORDER — HEPARIN SODIUM 5000 [USP'U]/ML
5000 INJECTION, SOLUTION INTRAVENOUS; SUBCUTANEOUS EVERY 8 HOURS SCHEDULED
Status: DISCONTINUED | OUTPATIENT
Start: 2020-07-09 | End: 2020-07-10 | Stop reason: HOSPADM

## 2020-07-09 RX ORDER — CELECOXIB 200 MG/1
200 CAPSULE ORAL ONCE
Status: DISCONTINUED | OUTPATIENT
Start: 2020-07-09 | End: 2020-07-09

## 2020-07-09 RX ORDER — OXYCODONE HYDROCHLORIDE 5 MG/1
5 TABLET ORAL EVERY 4 HOURS PRN
Status: DISCONTINUED | OUTPATIENT
Start: 2020-07-09 | End: 2020-07-10 | Stop reason: HOSPADM

## 2020-07-09 RX ORDER — ACETAMINOPHEN 325 MG/1
975 TABLET ORAL EVERY 8 HOURS SCHEDULED
Status: DISCONTINUED | OUTPATIENT
Start: 2020-07-09 | End: 2020-07-10 | Stop reason: HOSPADM

## 2020-07-09 RX ORDER — ATORVASTATIN CALCIUM 40 MG/1
40 TABLET, FILM COATED ORAL
Status: DISCONTINUED | OUTPATIENT
Start: 2020-07-09 | End: 2020-07-10 | Stop reason: HOSPADM

## 2020-07-09 RX ORDER — MAGNESIUM HYDROXIDE 1200 MG/15ML
LIQUID ORAL AS NEEDED
Status: DISCONTINUED | OUTPATIENT
Start: 2020-07-09 | End: 2020-07-09 | Stop reason: HOSPADM

## 2020-07-09 RX ORDER — SODIUM CHLORIDE 9 MG/ML
INJECTION, SOLUTION INTRAVENOUS CONTINUOUS PRN
Status: DISCONTINUED | OUTPATIENT
Start: 2020-07-09 | End: 2020-07-09 | Stop reason: SURG

## 2020-07-09 RX ADMIN — DEXAMETHASONE SODIUM PHOSPHATE 4 MG: 4 INJECTION, SOLUTION INTRAMUSCULAR; INTRAVENOUS at 07:35

## 2020-07-09 RX ADMIN — PROPOFOL 200 MG: 10 INJECTION, EMULSION INTRAVENOUS at 07:31

## 2020-07-09 RX ADMIN — SODIUM CHLORIDE, SODIUM GLUCONATE, SODIUM ACETATE, POTASSIUM CHLORIDE, MAGNESIUM CHLORIDE, SODIUM PHOSPHATE, DIBASIC, AND POTASSIUM PHOSPHATE 125 ML/HR: .53; .5; .37; .037; .03; .012; .00082 INJECTION, SOLUTION INTRAVENOUS at 14:41

## 2020-07-09 RX ADMIN — HEPARIN SODIUM 5000 UNITS: 5000 INJECTION INTRAVENOUS; SUBCUTANEOUS at 06:07

## 2020-07-09 RX ADMIN — HYDROMORPHONE HYDROCHLORIDE 0.5 MG: 1 INJECTION, SOLUTION INTRAMUSCULAR; INTRAVENOUS; SUBCUTANEOUS at 08:36

## 2020-07-09 RX ADMIN — INSULIN LISPRO 1 UNITS: 100 INJECTION, SOLUTION INTRAVENOUS; SUBCUTANEOUS at 17:03

## 2020-07-09 RX ADMIN — ROCURONIUM BROMIDE 50 MG: 10 INJECTION, SOLUTION INTRAVENOUS at 07:31

## 2020-07-09 RX ADMIN — HEPARIN SODIUM 5000 UNITS: 5000 INJECTION INTRAVENOUS; SUBCUTANEOUS at 22:58

## 2020-07-09 RX ADMIN — OXYCODONE HYDROCHLORIDE 5 MG: 5 TABLET ORAL at 19:49

## 2020-07-09 RX ADMIN — FENTANYL CITRATE 150 MCG: 50 INJECTION, SOLUTION INTRAMUSCULAR; INTRAVENOUS at 07:27

## 2020-07-09 RX ADMIN — ACETAMINOPHEN 975 MG: 325 TABLET ORAL at 05:49

## 2020-07-09 RX ADMIN — SODIUM CHLORIDE, SODIUM LACTATE, POTASSIUM CHLORIDE, AND CALCIUM CHLORIDE: .6; .31; .03; .02 INJECTION, SOLUTION INTRAVENOUS at 08:23

## 2020-07-09 RX ADMIN — HEPARIN SODIUM 5000 UNITS: 5000 INJECTION INTRAVENOUS; SUBCUTANEOUS at 14:41

## 2020-07-09 RX ADMIN — EPHEDRINE SULFATE 10 MG: 50 INJECTION, SOLUTION INTRAVENOUS at 08:05

## 2020-07-09 RX ADMIN — SODIUM CHLORIDE: 0.9 INJECTION, SOLUTION INTRAVENOUS at 07:36

## 2020-07-09 RX ADMIN — MIDAZOLAM 2 MG: 1 INJECTION INTRAMUSCULAR; INTRAVENOUS at 07:27

## 2020-07-09 RX ADMIN — SODIUM CHLORIDE, SODIUM GLUCONATE, SODIUM ACETATE, POTASSIUM CHLORIDE, MAGNESIUM CHLORIDE, SODIUM PHOSPHATE, DIBASIC, AND POTASSIUM PHOSPHATE 125 ML/HR: .53; .5; .37; .037; .03; .012; .00082 INJECTION, SOLUTION INTRAVENOUS at 23:34

## 2020-07-09 RX ADMIN — FENTANYL CITRATE 50 MCG: 50 INJECTION, SOLUTION INTRAMUSCULAR; INTRAVENOUS at 08:21

## 2020-07-09 RX ADMIN — GLYCOPYRROLATE 0.6 MG: 0.2 INJECTION, SOLUTION INTRAMUSCULAR; INTRAVENOUS at 10:28

## 2020-07-09 RX ADMIN — NEOSTIGMINE METHYLSULFATE 3 MG: 1 INJECTION, SOLUTION INTRAVENOUS at 10:28

## 2020-07-09 RX ADMIN — EPHEDRINE SULFATE 10 MG: 50 INJECTION, SOLUTION INTRAVENOUS at 07:40

## 2020-07-09 RX ADMIN — HYDROMORPHONE HYDROCHLORIDE 0.5 MG: 1 INJECTION, SOLUTION INTRAMUSCULAR; INTRAVENOUS; SUBCUTANEOUS at 08:56

## 2020-07-09 RX ADMIN — SODIUM CHLORIDE, SODIUM LACTATE, POTASSIUM CHLORIDE, AND CALCIUM CHLORIDE: .6; .31; .03; .02 INJECTION, SOLUTION INTRAVENOUS at 10:27

## 2020-07-09 RX ADMIN — PHENYLEPHRINE HYDROCHLORIDE 100 MCG: 10 INJECTION INTRAVENOUS at 07:43

## 2020-07-09 RX ADMIN — SUGAMMADEX 200 MG: 100 INJECTION, SOLUTION INTRAVENOUS at 10:32

## 2020-07-09 RX ADMIN — ACETAMINOPHEN 975 MG: 325 TABLET ORAL at 22:57

## 2020-07-09 RX ADMIN — LIDOCAINE HYDROCHLORIDE 100 MG: 20 INJECTION, SOLUTION EPIDURAL; INFILTRATION; INTRACAUDAL; PERINEURAL at 07:31

## 2020-07-09 RX ADMIN — TOPIRAMATE 50 MG: 25 TABLET, FILM COATED ORAL at 22:57

## 2020-07-09 RX ADMIN — SENNOSIDES 8.6 MG: 8.6 TABLET, FILM COATED ORAL at 22:58

## 2020-07-09 RX ADMIN — LAMOTRIGINE 100 MG: 100 TABLET ORAL at 22:58

## 2020-07-09 RX ADMIN — PHENYLEPHRINE HYDROCHLORIDE 100 MCG: 10 INJECTION INTRAVENOUS at 08:06

## 2020-07-09 RX ADMIN — ONDANSETRON 4 MG: 2 INJECTION INTRAMUSCULAR; INTRAVENOUS at 07:35

## 2020-07-09 RX ADMIN — CEFAZOLIN SODIUM 2000 MG: 2 SOLUTION INTRAVENOUS at 07:20

## 2020-07-09 RX ADMIN — PANTOPRAZOLE SODIUM 40 MG: 40 TABLET, DELAYED RELEASE ORAL at 17:03

## 2020-07-09 RX ADMIN — ROCURONIUM BROMIDE 20 MG: 10 INJECTION, SOLUTION INTRAVENOUS at 08:25

## 2020-07-09 RX ADMIN — ARIPIPRAZOLE 5 MG: 5 TABLET ORAL at 17:03

## 2020-07-09 RX ADMIN — HYDROMORPHONE HYDROCHLORIDE 1 MG: 1 INJECTION, SOLUTION INTRAMUSCULAR; INTRAVENOUS; SUBCUTANEOUS at 09:36

## 2020-07-09 RX ADMIN — GABAPENTIN 200 MG: 100 CAPSULE ORAL at 05:49

## 2020-07-09 RX ADMIN — ATORVASTATIN CALCIUM 40 MG: 40 TABLET, FILM COATED ORAL at 17:03

## 2020-07-09 RX ADMIN — SODIUM CHLORIDE, SODIUM LACTATE, POTASSIUM CHLORIDE, AND CALCIUM CHLORIDE 125 ML/HR: .6; .31; .03; .02 INJECTION, SOLUTION INTRAVENOUS at 06:11

## 2020-07-09 NOTE — DISCHARGE INSTRUCTIONS
[facility]  [place] E[place], and [place]  [phone]    Hysterectomy Discharge Instructions    WHAT YOU NEED TO KNOW:   A hysterectomy is surgery to remove your uterus  Your ovaries, fallopian tubes, cervix, or part of your vagina may also need to be removed  The organs and tissue that will be removed depends on your medical condition  After a hysterectomy, you will not be able to become pregnant  If your ovaries are removed, you will go through menopause if you have not already  DISCHARGE INSTRUCTIONS:   Contact your doctor at the number above if:   · You have a fever over 101o  · You have nausea or are vomiting that does not improve after a light meal    · Your pain is getting worse, even after you take medicine  · You feel pain or burning when you urinate, or you have trouble urinating  · You have pus or a foul-smelling odor coming from your vagina  · Your wound is red, swollen, or draining pus  · You see new or an increased amount of bright red blood coming from your vagina or your incisions  · You have questions or concerns about your condition or care  Seek care immediately:   · Your arm or leg feels warm, tender, and painful  It may look swollen and red  · You have increasing abdominal or pelvic pain  · You have heavy vaginal bleeding that fills 1 or more sanitary pads in 1 hour  Call 911 for any of the following:   · You feel lightheaded, short of breath, and have chest pain  · You cough up blood  Medicines: You may need any of the following:  · Prescription medicine may be given  You may receive a prescription for pain medication or be advised to use over the counter (OTC) pain medication such as acetaminophen (Tylenol) or ibuprofen (Advil, Motrin)  Ask your healthcare provider how to take this medicine safely  · NSAIDs , such as ibuprofen, help decrease swelling, pain, and fever   NSAIDs can cause stomach bleeding or kidney problems in certain people  If you take blood thinner medicine, always ask your healthcare provider if NSAIDs are safe for you  Always read the medicine label and follow directions  · Stool softeners help treat or prevent constipation  · Take your medicine as directed  Contact your healthcare provider if you think your medicine is not helping or if you have side effects  Tell him or her if you are allergic to any medicine  Keep a list of the medicines, vitamins, and herbs you take  Include the amounts, and when and why you take them  Bring the list or the pill bottles to follow-up visits  Carry your medicine list with you in case of an emergency  Activity:   · Rest as needed  Get up and move around as directed to help prevent blood clots  Start with short walks and slowly increase the distance every day  Limit the number of times you climb stairs to 2 times each day for the first week  Plan most of your daily activities on one level of your home  · Do not lift objects heavier than 10 pounds for 6 weeks  Avoid strenuous activity for 2 weeks  · Do not strain during bowel movements  High-fiber foods and extra liquids can help you prevent constipation  Examples of high-fiber foods are fruit and bran  Prune juice and water are good liquids to drink  · Do not have sex, use tampons, or douche for up to 8 weeks  You may shower as soon as the day after surgery  Tub baths can be taken starting 2 weeks after surgery  Do not go into pools or hot tubs until cleared by your doctor  · Ask when it is safe for you to drive  It is generally safe to drive after 2 weeks and when no longer taking prescription pain medication  · Ask when you may return to work and to other regular activities  Wound care: Care for your abdominal incisions as directed  Carefully wash around the wound with soap and water   If you have Hibiclens or medicated soap that you were instructed to use before surgery, you may use that to wash with for up to 2 days after surgery  If not, any mild non-scented, non-abrasive soap is safe  It is okay to let the soap and water run over your incision  Do not scrub your incision  Dry the area and put on new, clean bandages as directed  Change your bandages when they get wet or dirty  If you have strips of medical tape, let them fall off on their own  It may take 7 to 14 days for them to fall off  Check your incision every day for redness, swelling, or pus  Deep breathing: Take deep breaths and cough 10 times each hour  This will decrease your risk for a lung infection  Take a deep breath and hold it for as long as you can  Let the air out and then cough strongly  Deep breaths help open your airway  You may be given an incentive spirometer to help you take deep breaths  Put the plastic piece in your mouth and take a slow, deep breath, then let the air out and cough  Repeat these steps 10 times every hour  Get support: This surgery may be life-changing for you and your family  You will no longer be able to get pregnant  Sudden changes in the levels of your hormones may occur and cause mood swings and depression  You may feel angry, sad, or frightened, or cry frequently and unexpectedly  These feelings are normal  Talk to your healthcare provider about where you can get support  You can also ask if hormone replacement medicine is right for you  Follow up with your healthcare provider or gynecologist as directed: You may need to return to have stitches removed, and for other tests  Write down your questions so you remember to ask them during your visits  © 2017 [employer] [employer] Information is for End User's use only and may not be sold, redistributed or otherwise used for commercial purposes  All illustrations and images included in CareNotes® are the copyrighted property of [employer]  or [employer]  The above information is an  only  It is not intended as medical advice for individual conditions or treatments  Talk to your doctor, nurse or pharmacist before following any medical regimen to see if it is safe and effective for you

## 2020-07-09 NOTE — INTERVAL H&P NOTE
H&P reviewed  After examining the patient I find the following changes in the patients condition since the H&P was written:    1  Endometrial cnacer: Plan to proceed with surgery as planned  Pt agrees  2  Elevated Cr 1 37 mg/dL: Baseline 0 9-1 1 mg/dL  Will avoid NSAIDs  Celebrex order canceled  Given known h/o nephrolithiasis, diabetes, etc, pt has multiple potential contributing factors  Plan to obtain STAT BMP now  Consider in OR retrograde byelogram +/- stent depending on findings  Pt counseled and consents to proceed if/as indicated  Vitals:    07/09/20 0553   BP: (!) 182/90   Pulse: 62   Resp: 18   Temp: (!) 97 2 °F (36 2 °C)   SpO2: 95%     Pre-op test results noted      Evelia Ocampo MD, [facility] [place] [address]  7/9/2020  6:54 AM

## 2020-07-09 NOTE — ANESTHESIA PREPROCEDURE EVALUATION
Review of Systems/Medical History  Patient summary reviewed        Cardiovascular  Negative cardio ROS Hyperlipidemia, Hypertension controlled,    Pulmonary  Negative pulmonary ROS Asthma , PRN med  controlled , Shortness of breath, Sleep apnea ,        GI/Hepatic  Negative GI/hepatic ROS   GERD well controlled,        Negative  ROS Kidney stones,        Endo/Other  Negative endo/other ROS Diabetes well controlled type 2 Oral agent,      GYN  Negative gynecology ROS          Hematology  Negative hematology ROS      Musculoskeletal  Negative musculoskeletal ROS        Neurology  Negative neurology ROS      Psychology   Negative psychology ROS Anxiety, Depression ,              Physical Exam    Airway    Mallampati score: III  TM Distance: >3 FB  Neck ROM: full     Dental       Cardiovascular  Comment: Negative ROS, Rhythm: regular, Rate: normal, Cardiovascular exam normal    Pulmonary  Pulmonary exam normal Breath sounds clear to auscultation,     Other Findings        Anesthesia Plan  ASA Score- 3     Anesthesia Type- general with ASA Monitors  Additional Monitors:   Airway Plan: ETT  Plan Factors-    Induction- intravenous  Postoperative Plan-     Informed Consent- Anesthetic plan and risks discussed with patient

## 2020-07-09 NOTE — PLAN OF CARE
Problem: Potential for Falls  Goal: Patient will remain free of falls  Description  INTERVENTIONS:  - Assess patient frequently for physical needs  -  Identify cognitive and physical deficits and behaviors that affect risk of falls    -  [place] fall precautions as indicated by assessment   - Educate patient/family on patient safety including physical limitations  - Instruct patient to call for assistance with activity based on assessment  - Modify environment to reduce risk of injury  - Consider OT/PT consult to assist with strengthening/mobility  Outcome: Progressing     Problem: RESPIRATORY - ADULT  Goal: Achieves optimal ventilation and oxygenation  Description  INTERVENTIONS:  - Assess for changes in respiratory status  - Assess for changes in mentation and behavior  - Position to facilitate oxygenation and minimize respiratory effort  - Oxygen administered by appropriate delivery if ordered  - Initiate smoking cessation education as indicated  - Encourage broncho-pulmonary hygiene including cough, deep breathe, Incentive Spirometry  - Assess the need for suctioning and aspirate as needed  - Assess and instruct to report SOB or any respiratory difficulty  - Respiratory Therapy support as indicated  Outcome: Progressing     Problem: SKIN/TISSUE INTEGRITY - ADULT  Goal: Skin integrity remains intact  Description  INTERVENTIONS  - Identify patients at risk for skin breakdown  - Assess and monitor skin integrity  - Assess and monitor nutrition and hydration status  - Monitor labs (i e  albumin)  - Assess for incontinence   - Turn and reposition patient  - Assist with mobility/ambulation  - Relieve pressure over bony prominences  - Avoid friction and shearing  - Provide appropriate hygiene as needed including keeping skin clean and dry  - Evaluate need for skin moisturizer/barrier cream  - Collaborate with interdisciplinary team (i e  Nutrition, Rehabilitation, etc )   - Patient/family teaching  Outcome: Progressing     Problem: HEMATOLOGIC - ADULT  Goal: Maintains hematologic stability  Description  INTERVENTIONS  - Assess for signs and symptoms of bleeding or hemorrhage  - Monitor labs  - Administer supportive blood products/factors as ordered and appropriate  Outcome: Progressing     Problem: PAIN - ADULT  Goal: Verbalizes/displays adequate comfort level or baseline comfort level  Description  Interventions:  - Encourage patient to monitor pain and request assistance  - Assess pain using appropriate pain scale  - Administer analgesics based on type and severity of pain and evaluate response  - Implement non-pharmacological measures as appropriate and evaluate response  - Consider cultural and social influences on pain and pain management  - Notify physician/advanced practitioner if interventions unsuccessful or patient reports new pain  Outcome: Progressing     Problem: INFECTION - ADULT  Goal: Absence or prevention of progression during hospitalization  Description  INTERVENTIONS:  - Assess and monitor for signs and symptoms of infection  - Monitor lab/diagnostic results  - Monitor all insertion sites, i e  indwelling lines, tubes, and drains  - Monitor endotracheal if appropriate and nasal secretions for changes in amount and color  - [place] appropriate cooling/warming therapies per order  - Administer medications as ordered  - Instruct and encourage patient and family to use good hand hygiene technique  - Identify and instruct in appropriate isolation precautions for identified infection/condition  Outcome: Progressing  Goal: Absence of fever/infection during neutropenic period  Description  INTERVENTIONS:  - Monitor WBC    Outcome: Progressing     Problem: SAFETY ADULT  Goal: Patient will remain free of falls  Description  INTERVENTIONS:  - Assess patient frequently for physical needs  -  Identify cognitive and physical deficits and behaviors that affect risk of falls    -  [place] fall precautions as indicated by assessment   - Educate patient/family on patient safety including physical limitations  - Instruct patient to call for assistance with activity based on assessment  - Modify environment to reduce risk of injury  - Consider OT/PT consult to assist with strengthening/mobility  Outcome: Progressing  Goal: Maintain or return to baseline ADL function  Description  INTERVENTIONS:  -  Assess patient's ability to carry out ADLs; assess patient's baseline for ADL function and identify physical deficits which impact ability to perform ADLs (bathing, care of mouth/teeth, toileting, grooming, dressing, etc )  - Assess/evaluate cause of self-care deficits   - Assess range of motion  - Assess patient's mobility; develop plan if impaired  - Assess patient's need for assistive devices and provide as appropriate  - Encourage maximum independence but intervene and supervise when necessary  - Involve family in performance of ADLs  - Assess for home care needs following discharge   - Consider OT consult to assist with ADL evaluation and planning for discharge  - Provide patient education as appropriate  Outcome: Progressing  Goal: Maintain or return mobility status to optimal level  Description  INTERVENTIONS:  - Assess patient's baseline mobility status (ambulation, transfers, stairs, etc )    - Identify cognitive and physical deficits and behaviors that affect mobility  - Identify mobility aids required to assist with transfers and/or ambulation (gait belt, sit-to-stand, lift, walker, cane, etc )  - [place] fall precautions as indicated by assessment  - Record patient progress and toleration of activity level on Mobility SBAR; progress patient to next Phase/Stage  - Instruct patient to call for assistance with activity based on assessment  - Consider rehabilitation consult to assist with strengthening/weightbearing, etc   Outcome: Progressing     Problem: DISCHARGE PLANNING  Goal: Discharge to home or other facility with appropriate resources  Description  INTERVENTIONS:  - Identify barriers to discharge w/patient and caregiver  - Arrange for needed discharge resources and transportation as appropriate  - Identify discharge learning needs (meds, wound care, etc )  - Arrange for interpretive services to assist at discharge as needed  - Refer to Case Management Department for coordinating discharge planning if the patient needs post-hospital services based on physician/advanced practitioner order or complex needs related to functional status, cognitive ability, or social support system  Outcome: Progressing     Problem: Knowledge Deficit  Goal: Patient/family/caregiver demonstrates understanding of disease process, treatment plan, medications, and discharge instructions  Description  Complete learning assessment and assess knowledge base    Interventions:  - Provide teaching at level of understanding  - Provide teaching via preferred learning methods  Outcome: Progressing

## 2020-07-09 NOTE — ANESTHESIA POSTPROCEDURE EVALUATION
Post-Op Assessment Note    CV Status:  Stable    Pain management: adequate     Mental Status:  Alert and awake   Hydration Status:  Euvolemic   PONV Controlled:  Controlled   Airway Patency:  Patent  Airway: intubated   Post Op Vitals Reviewed: Yes      Staff: Anesthesiologist   Comments: Patient demonstrated airway obstruction possibly secondary to residual anesthetic vs  C02 narcosis vs opioid over sedation  The patient was given 0 4 mg  Narcan x 2 and placed on BiPAP with dramatic improvement in her mental status and oxygenation  After close monitoring she was switched to CPAP and D/C to floor            BP      Temp      Pulse     Resp      SpO2

## 2020-07-09 NOTE — CONSULTS
Inpatient Consultation - [facility]    Patient Information: Fernanda Aguiar 72 y o  female MRN: [id number]  Unit/Bed#: E5 -01 Encounter: [id number]  PCP: Shanti Doyle MD  Date of Admission:  7/9/2020  Date of Consultation: 07/09/20  Requesting Physician: Patrick Ahmadi MD    Reason For Consultation:  ZOE and nephrolithiasis  Assessment/Plan:    Fernanda Aguiar is a 73 y/o F with PMH of HTN, DMT2, HLD, LATIA, obesity admitted under Gyn Oncology inpatient service for management of grade 1 endometrial cancer  She is currently post operative day 0 s/p robotic total hysterectomy, b/l salpingo-oophorectomy with bilateral pelvic sentinel lymph node biopsies and cystoscopy with retrograde pyelogram  Patient received 19 ml Iohexol solution today for her procedure  It was noted on labs that Cr bumped to 1 31, Bun 19 and GFR 47  She was placed on plasmalyte  cc/hr and new BMP drawn later with Cr noted to trending down to 1 22   CT abd pelvis w/o contrast revealed 22 x 21 mm left renal pelvic calculus, with no evidence of hydronephrosis and tiny b/l non-obstructive renal calculi  Pt has hx of laser surgery for removal of right renal calculi and cholecystectomy for gallstones 4 yrs ago  Urology was consulted for further recommendations  1  ZOE  Acute on chronic CKD Stage 2  Most likely etiology 2/2 to hypotension events, BP dropped bellow 110 intraoperatively which might have caused renal hypoperfusion  A different cause can be attributable to contrast induced kidney damage s/p small quantity of Iohexol solution  A component of dehydration could account as well, patient admits to not drinking enough water  Hx of uncontrolled HTN and DMT2 can also play a role towards the deterioration of her renal function  Initially Cr 1 31--> trending down to 1 22;  Bun 19-->20; GFR 43-->47  Baseline Cr 0 9-1 1  Continue Plasmalyte 125 cc/hr   Avoid nephrotoxic agents such as NSAIDS  Hold BP home meds: Lisinopril, HCTZ  Avoid Morphine, due to renal clearance of metabolites  DVT prophylaxis with Heparin  I/Os and maintain urinary catheter   BMP in am    2  Nephrolithiasis  Hx of kidney stones with removal of right calculi s/p laser therapy   Nonobstructive stone on retrograde pyelogram  CT abdomen/pelvis wo contrast revealed 22 x 21 mm left renal pelvis calculus which may result in mild left upper pole calyx dilatation  No overt hydronephrosis is present  Tiny bilateral nonobstructing renal calculi  Currently asymptomatic, afebrile and comfortable appearing  Pt reports left sided flank pain and urinary frequency 2 weeks back  Urology consulted with the recommendation of interval PCNL for definitive stone treatment after she recovers post operatively from her hysterectomy    3  HTN  Poorly controlled   HTN goal <140/90  Home medications: Lisinopril 20 mg, HCTZ 25 mg which were held  Will continue to hold these meds  Continue Lopressor 25 mg daily with holding parameters SBP <120   Avoid hypotensive events     4  DMT2  Well controlled, HbA1c 6 6  Home medication: Metformin 500 mg BID, held on admission  Continue to hold Metformin   ISS and Accu-checks ACHS  Diabetic diet    5  LATIA  Continue CPAP    6  Endometrial cancer, grade 1  S/p robotic total hysterectomy today   Management per primary team    VTE Prophylaxis: Heparin  / sequential compression device     Recommendations for Discharge:  · Per primary team    Counseling / Coordination of Care Time: 30 minutes  Greater than 50% of total time spent on patient counseling and coordination of care  Collaboration of Care: Were Recommendations Directly Discussed with Primary Treatment Team? - Yes     History of Present Illness:    Ta Blue is a 72 y o  female with PMH of HTN, DMT2, HLD, obesity, LATIA who is originally admitted to the [address] [place] [address] Oncology service on 7/9/2020 due to management of endometrial cancer, grade 1   Patient is currently post-operative day 0 s/p robotic total hysterectomy with b/l salpingo-oophorectomy with b/l pelvic sentinel LN biopsy  We are consulted for ZOE and nephrolithiasis  Patient states she if feeling overall comfortable, in no distress, and with no significant pain  She denies any current flank pain, dysuria, hematuria, urinary urgency  She admits to having L sided flank pain 2 weeks back along with urinary frequency  She reports hx of right side calculi s/p laser treatment 4 years ago along with open cholecystectomy for the removal of gallstones  She denies any hx of NSAID use  No other renal problems in the past, has never established with a nephrologist outpatient  We reviewed her labs, & CT findings and explained in detail the plan  No further questions or concerns endorsed  Review of Systems:    Review of Systems   Constitutional: Negative for chills, fatigue and fever  Eyes: Negative for visual disturbance  Respiratory: Negative for cough and shortness of breath  Cardiovascular: Negative for chest pain, palpitations and leg swelling  Gastrointestinal: Negative for abdominal pain, blood in stool, constipation, diarrhea, nausea and vomiting  Genitourinary: Positive for frequency  Negative for dysuria, hematuria and pelvic pain  Left flank pain 2 weeks ago, resolved    Skin: Negative for rash  Neurological: Negative for dizziness, weakness and headaches  Psychiatric/Behavioral: Negative for agitation         Past Medical and Surgical History:   Past Medical History:   Diagnosis Date    Anxiety     Asthma     [facility]     [facility]     CPAP (continuous positive airway pressure) dependence     currently using    Depression     Diabetes mellitus (HCC)     NIDDM    GERD (gastroesophageal reflux disease)     Glaucoma     History of transfusion     2x with birth of both babies 1970and 1983, no reaction    Hyperlipidemia     Hypertension     Kidney stone     Obesity     PMB (postmenopausal bleeding)     Shortness of breath     Sleep apnea     Stroke [facility]) 2016    Thickened endometrium     Uterus cancer ([address] )     Wears glasses      Past Surgical History:   Procedure Laterality Date    APPENDECTOMY      CHOLECYSTECTOMY      KIDNEY STONE SURGERY      [place] HYSTEROSCOPY,W/ENDO BX N/A 2020    Procedure: DILATATION AND CURETTAGE (D&C) WITH HYSTEROSCOPY;  Surgeon: Liza Mills MD;  Location: [facility];  Service: Gynecology    TUBAL LIGATION       Meds/Allergies: Allergies: No Known Allergies  Prior to Admission Medications   Prescriptions Last Dose Informant Patient Reported? Taking?    ARIPiprazole (ABILIFY) 5 mg tablet 2020 at 1800 Self Yes Yes   Sig: Take 5 mg by mouth every evening    Blood Pressure KIT   No No   Sig: by Does not apply route daily   Cholecalciferol (VITAMIN D3) 25 MCG (1000 UT) CAPS 2020 Self Yes Yes   Sig: Take 1,000 Units by mouth daily    Lancets (FREESTYLE) lancets   No No   Sig: Test 3xs daily   acetaminophen (TYLENOL) 650 mg CR tablet 2020  No No   Sig: Take 1 tablet (650 mg total) by mouth every 8 (eight) hours as needed for mild pain   Patient not taking: Reported on 2020   albuterol (PROVENTIL HFA,VENTOLIN HFA) 90 mcg/act inhaler 2020  No Yes   Sig: Inhale 2 puffs every 6 (six) hours as needed for wheezing or shortness of breath   atorvastatin (LIPITOR) 40 mg tablet 2020 at 0400  No Yes   Sig: TAKE ONE TABLET BY MOUTH ONCE DAILY   Patient taking differently: Take 40 mg by mouth daily    buPROPion (WELLBUTRIN) 75 mg tablet 2020 at 0400 Self Yes Yes   Sig: Take 75 mg by mouth every morning    glucose blood (FREESTYLE TEST STRIPS) test strip   No No   Sig: Test 3xs daily   glucose monitoring kit (FREESTYLE) monitoring kit   No No   Si each by Does not apply route 3 (three) times a day TEST 3XS DAILY   hydrochlorothiazide (HYDRODIURIL) 25 mg tablet 2020 at 0900  No Yes   Sig: TAKE ONE TABLET BY MOUTH ONCE DAILY   Patient taking differently: Take 25 mg by mouth daily    lamoTRIgine (LaMICtal) 100 mg tablet 7/7/2020 Self Yes Yes   Sig: Take 100 mg by mouth daily at bedtime   lisinopril (ZESTRIL) 20 mg tablet 7/8/2020 at 0900  No Yes   Sig: Take 1 tablet (20 mg total) by mouth daily   metFORMIN (GLUCOPHAGE) 500 mg tablet 7/8/2020 at 0900  No Yes   Sig: TAKE ONE TABLET BY MOUTH TWICE A DAY WITH MEALS   Patient taking differently: Take 500 mg by mouth 2 (two) times a day with meals    metoprolol tartrate (LOPRESSOR) 25 mg tablet 7/9/2020 at 0400  No Yes   Sig: Take 1 tablet (25 mg total) by mouth daily   misoprostol (CYTOTEC) 200 mcg tablet   No No   Sig: Take 1 tablet (200 mcg total) by mouth 4 (four) times a day   Patient not taking: Reported on 6/16/2020   omeprazole (PriLOSEC) 20 mg delayed release capsule 7/8/2020 at 1800  No Yes   Sig: Take 2 capsules (40 mg total) by mouth every evening   polyethylene glycol (GLYCOLAX) 17 GM/SCOOP powder   No Yes   Sig: At 5pm take 5mgx2 dulcolax  At 6pm 32oz miralax in 64oz gatorade  Drink 8oz glass every 5 mins until 32oz finished  Drink remaining as rec     topiramate (TOPAMAX) 50 MG tablet 7/7/2020 Self Yes Yes   Sig: Take 50 mg by mouth daily at bedtime      Facility-Administered Medications: None     Social History:     Social History     Socioeconomic History    Marital status: Single     Spouse name: Not on file    Number of children: Not on file    Years of education: Not on file    Highest education level: Not on file   Occupational History    Not on file   Social Needs    Financial resource strain: Not on file    Food insecurity:     Worry: Not on file     Inability: Not on file    Transportation needs:     Medical: Not on file     Non-medical: Not on file   Tobacco Use    Smoking status: Never Smoker    Smokeless tobacco: Never Used   Substance and Sexual Activity    Alcohol use: Yes     Frequency: Monthly or less     Drinks per session: 1 or 2 Binge frequency: Never    Drug use: Never    Sexual activity: Yes     Partners: Male     Birth control/protection: Female Sterilization   Lifestyle    Physical activity:     Days per week: Not on file     Minutes per session: Not on file    Stress: Not on file   Relationships    Social connections:     Talks on phone: Not on file     Gets together: Not on file     Attends Restoration service: Not on file     Active member of club or organization: Not on file     Attends meetings of clubs or organizations: Not on file     Relationship status: Not on file    Intimate partner violence:     Fear of current or ex partner: Not on file     Emotionally abused: Not on file     Physically abused: Not on file     Forced sexual activity: Not on file   Other Topics Concern    Not on file   Social History Narrative    ** Merged History Encounter **            Family History:  History reviewed  No pertinent family history  Physical Exam:     Vitals:   Blood Pressure: 127/73 (07/09/20 1335)  Pulse: 74 (07/09/20 1335)  Temperature: 98 2 °F (36 8 °C) (07/09/20 1335)  Temp Source: Temporal (07/09/20 1335)  Respirations: 16 (07/09/20 1335)  Height: 5' 4" (162 6 cm) (07/09/20 0553)  Weight - Scale: 99 8 kg (220 lb) (07/09/20 0553)  SpO2: 96 % (07/09/20 1537)    Physical Exam   Constitutional: She is oriented to person, place, and time  She appears well-developed and well-nourished  No distress  Wearing CPAP   HENT:   Head: Normocephalic and atraumatic  Nose: Nose normal    Mouth/Throat: Oropharynx is clear and moist    Eyes: Conjunctivae and EOM are normal    Neck: Normal range of motion  Neck supple  Cardiovascular: Normal rate, regular rhythm and normal heart sounds  Pulmonary/Chest: Effort normal and breath sounds normal  No respiratory distress  Abdominal: Soft  Bowel sounds are normal  There is no tenderness  There is no guarding     RUQ 4 cm linear scar s/p cholecystectomy 4 years ago  4 small incisions, clean, dry and intact s/p robotic total hysterectomy today   Genitourinary:   Genitourinary Comments: Stringer catheter in place    Musculoskeletal: Normal range of motion  She exhibits no edema or tenderness  No b/l  CVA tenderness   SCDs b/l    Lymphadenopathy:     She has no cervical adenopathy  Neurological: She is alert and oriented to person, place, and time  Skin: Skin is warm  No rash noted  She is not diaphoretic  Psychiatric: She has a normal mood and affect  Her behavior is normal  Judgment and thought content normal    Nursing note and vitals reviewed  Additional Data:     Lab Results: I have personally reviewed pertinent reports  Results from last 7 days   Lab Units 07/09/20  1146   WBC Thousand/uL 10 60*   HEMOGLOBIN g/dL 11 6   HEMATOCRIT % 38 4   PLATELETS Thousands/uL 262     Results from last 7 days   Lab Units 07/09/20  1146   POTASSIUM mmol/L 3 6   CHLORIDE mmol/L 105   CO2 mmol/L 25   BUN mg/dL 18   CREATININE mg/dL 1 22   CALCIUM mg/dL 8 2*           Imaging: I have personally reviewed pertinent reports  Ct Renal Stone Study Abdomen Pelvis Wo Contrast    Result Date: 6/25/2020  Narrative: CT ABDOMEN AND PELVIS WITHOUT IV CONTRAST - LOW DOSE RENAL STONE INDICATION:   Flank pain, kidney stone suspected  Left flank pain  COMPARISON:  1/24/2017  TECHNIQUE:  Low dose thin section CT examination of the abdomen and pelvis was performed without intravenous or oral contrast according to a protocol specifically designed to evaluate for urinary tract calculus  Axial, sagittal, and coronal 2D reformatted images were created from the source data and submitted for interpretation  Evaluation for pathology in the abdomen and pelvis that is unrelated to urinary tract calculi is limited  Radiation dose length product (DLP) for this visit:  660 mGy-cm     This examination, like all CT scans performed in the [facility], was performed utilizing techniques to minimize radiation dose exposure, including the use of iterative reconstruction and automated exposure control  FINDINGS: RIGHT KIDNEY AND URETER: Tiny 1 to 2 mm nonobstructing right renal calculus is noted  No hydronephrosis or hydroureter  LEFT KIDNEY AND URETER: 22 x 21 mm left renal pelvis calculus noted this may result in some left upper pole calyx dilatation  3 mm nonobstructing left lower pole renal calculus is noted  No hydronephrosis or hydroureter  URINARY BLADDER: Unremarkable  Small to moderate hiatal hernia noted  Limited low radiation dose noncontrast CT evaluation demonstrates no clinically significant abnormality of liver, spleen, pancreas, or adrenal glands  The gallbladder is surgically absent  No ascites or bulky lymphadenopathy on this limited noncontrast study  Bowel loops appear unremarkable  Limited evaluation demonstrates no evidence to suggest acute appendicitis  No acute fracture or destructive osseous lesion is identified  Impression: 22 x 21 mm left renal pelvis calculus which may result in mild left upper pole calyx dilatation  No overt hydronephrosis is present  Tiny bilateral nonobstructing renal calculi  Workstation performed: [id number]       ** Please Note: This note has been constructed using a voice recognition system   Wenceslao Mcgarry MD  07/09/20  4:15 PM

## 2020-07-09 NOTE — TELEPHONE ENCOUNTER
Patient is a large left renal stone 2 x 2 cm, requires interval PCNL  She is currently recovering from robotic hysterectomy at this time    Please arrange outpatient follow-up with advanced practitioner in several weeks to schedule PCNL at an interval     Eleanor Najjar, PA-C  Date: 7/9/2020 Time: 3:30 PM

## 2020-07-09 NOTE — OP NOTE
OPERATIVE REPORT  PATIENT NAME: Fernanda Aguiar    :  1955  MRN: [id number]  Pt Location: [facility] OR ROOM 06    SURGERY DATE: 2020    Surgeon(s) and Role:     * Patrick Ahmadi MD - Primary     * Tali Hernández PA-C - Assisting     * Fabián Quinn MD - Shyann Machado MD - Fellow    Preop Diagnosis:  Endometrial cancer, grade I ([address] ) [C54 1]    Post-Op Diagnosis Codes:     * Endometrial cancer, grade I ([address] ) [C54 1]    Procedure(s) (LRB):  ROBOTIC TOTAL HYSTERECTOMY, BILATERAL SALPINGO-OOPHORECTOMY, PELVIC SENTINEL LYMPH NODE BIOPSIES I (N/A)  LYMPHOGRAPHY WITH INDOCYANINE GREEN (ICG) (N/A)  BILATERAL PELVIC EXCISIONAL SENTINEL LYMPH NODE BIOPSIES (N/A)  CYSTOSCOPY WITH RETROGRADE PYELOGRAM (Bilateral)    Specimen(s):  ID Type Source Tests Collected by Time Destination   1 : left external iliac sentinel lymph node Tissue Lymph Node, [place] TISSUE EXAM Patrick Ahmadi MD 2020 0848    2 : right external lilac sentinel lymph node Tissue Lymph Node, [place] TISSUE EXAM Patrick Ahmadi MD 2020 6859    3 : pelvic washings Washing Peritoneal Washings NON-GYNECOLOGIC CYTOLOGY Patrick Ahmadi MD 2020 9402    4 : right common iliac sentinel lymph nodes Tissue Lymph Node, [place] TISSUE EXAM Patrick Ahmadi MD 2020 7700    5 : uterus, cervix, b/l fallopian tubes and ovaries Tissue Uterus w/Bilateral Ovaries and Fallopian Tubes TISSUE EXAM Patrick Ahmadi MD 2020 1783        Estimated Blood Loss:   Minimal    Drains:  Urethral Catheter Non-latex; Double-lumen 16 Fr  (Active)   Number of days: 0       [REMOVED] Urethral Catheter Non-latex 16 Fr  (Removed)   Number of days: 0       Anesthesia Type:   General    Operative Indications:  Patient with FIGO grade 1 endometrial cancer and diabetes  She recently consulted the emergency department due to left flank pain and CT scan demonstrated left kidney stone  At that time, this was nonobstructive    Of interest, her creatinine has been increasing from baseline of originally 0 7, then 1 5 to now more recently 1 37 mg/dL  After counseling regarding indications, risks benefits and alternatives of surgical exploration, she consented and wanted to proceed  Operative Findings:  1  Retrograde pyelograms obtained at the beginning of the procedure due to rising creatinine and persistent pain to rule out the possibility of obstructive process associated with recently diagnosed left renal pelvis stone  No evidence of obstruction noted  Approximately 2 cm radiopaque left renal pelvis stone documented  2  After cervical ICG injection; left external iliac, right external iliac and right common iliac sentinel lymph nodes were identified using firefly camera  All those were excised sent for surgical pathology  3  Bilateral adhesions from ovaries to pelvic sidewalls and posterior cul-de-sac  Otherwise grossly normal ovaries  4  Grossly normal left fallopian tube  5  Right fallopian tube densely adherent to posterior cul-de-sac with area of hydrosalpinx identified  6  Approximately 12 week size uterus, grossly normal cervix and no evidence of extra uterine disease  7  At the completion procedure cystoscopy demonstrated bilateral potent ureteral urine jets and no evidence of bladder injuries  Complications:   None    Procedure and Technique:  The patient was taken to the operating room where general endotracheal anesthesia was induced without complications  The patient received antibiotic prophylaxis per hospital protocol  Sequential compression devices were applied to the lower extremities and activated prior to induction of anesthesia  A single dose of preoperative heparin was administered  The patient was placed in the dorsolithotomy position in [address] [place] [address]  First, the bladder vagina were prepped and draped and cystoscopy with retrograde pyelograms was obtained    For this purpose a 25 Samoan 30 degree cystoscope was used and bilateral ureteral orifices were sequentially cannulated with 5 [employer] Jewels open-ended ureteral catheter  Injection of iodinated contrast delineated the ureters and renal pelvises bilaterally with the above-mentioned findings  The rest the bladder was examined and noted to be normal   At the completion, 16 [employer]els Stringer was placed  At this point, the patient's abdomen was prepped and and the patient was re-draped in the usual sterile fashion  Under direct visualization the uterus was sounded to approximately 11 cm  The cervix was injected with approximately 5 mL of ICG at 3 and 9 o'clock positions per sentinel lymph node protocol  The endocervix was dilated  A SELENA uterine manipulator was secured in place with a stitch of 0 Vicryl  Stringer catheter was placed and the intravaginal occluder balloon was inflated      Attention was turned to the abdomen  All port sites were infiltrated with bupivacaine at the beginning of completion of the procedure  An 8 mm skin incision was made approximately 4 cm above the umbilicus near the midline  Then, using a 5 mm Endopath Excel trocar and the 5 mm laparoscope, the peritoneal cavity was entered under directvisualization  Good intraperitoneal location was confirmed and pneumoperitoneum was created to maximal pressure 15 mm of mercury  A total of four 8 mm robotic trocars were placed (2 on the left, 1 in the midline replacing the 5 mm entry port and 1 on the right) and the 5 mm entry trocar was reinserted in the right flank for assistant's use  The patient was placed in steep Trendelenburg and the Syncronexinci system was docked  The above-mentioned findings were noted  The round ligaments were cauterized and divided bilaterally and the bladder was mobilized anteriorly  The pararectal and paravesical spaces were developed and the ureters clearly identified  Using firefly camera sentinel lymph nodes were clearly identified and excised using sharp dissection    Adhesiolysis was used to mobilize dense adhesions from bilateral ovaries and the right fallopian tube to the pelvic sidewalls and posterior cul-de-sac  The ovarian vessels were skeletonized,cauterized and divided bilaterally  The fallopian tubes and ovaries were mobilized without difficulties  The uterine vessels were skeletonized cauterized and divided bilaterally  The cardinal ligaments were serially cauterized and divided and a circumferential colpotomy was made  The specimen was removed through the vagina  The vaginal cuff was closed using a running stitch of 2-0 PDO Stratafix  Airtight closure an excellent hemostasis was obtained  Copious irrigation was used and excellent hemostasis was confirmed at low intraperitoneal pressures  Surgicel was applied to the vaginal cuff and right common iliac dissection bed  At this point the robot was undocked  Pneumoperitoneum was completely released with the assistance of Valsalva maneuvers  All trocars were removed and the skin was closed using a subcuticular stitch of 4-0 Monocryl and Histoacryl was applied to all incisions      The Stringer was removed  Using the cystoscope, the bladder was re-examined and the above mentioned findings were noted  At the completion, a new 12 [employer] Jewels Stringer catheter was inserted for perioperative fluid balance monitoring given recent increase in creatinine      The patient tolerated the procedure well  Sponge, lap, needle and instrument counts were reported as correct x2    The patient was successfully extubated in the operating room and transferred to the post anesthetic care unit in stable condition       I was present for the entire procedure    Patient Disposition:  PACU     SIGNATURE: Liyah Hines MD, [facility]  DATE: July 9, 2020  TIME: 10:31 AM

## 2020-07-09 NOTE — PERIOPERATIVE NURSING NOTE
Kem Garcia (OR) made aware that new blood work ordered stat & repeat ABO Rh need to be done ASAP in holding

## 2020-07-09 NOTE — PROGRESS NOTES
Plan to keep patient overnight to monitor urine output obtain Renal consult as per ZOE protocol given recent elevation in creatinine  For now, patient will be as outpatient/no charge bed  Reassess status and plan tomorrow      Cadence Jacobo MD, [address] [place] [address]  7/9/2020  11:14 AM

## 2020-07-09 NOTE — CONSULTS
Consult - Urology   Adriano Quinones 1955, 72 y o  female MRN: [id number]    Unit/Bed#: E5 -01 Encounter: [id number]    Nephrolithiasis  Assessment & Plan  Large left renal stone measuring 2 2 x 2 1 cm, nonobstructing on retrograde pyelogram by the GYN Oncology team today  I do not think this explains her rising creatinine  She has had some pain preoperatively from this  Will require interval PCNL for definitive stone treatment when she has recovered from her hysterectomy  Will arrange  Bedside rounds performed with Tala Wallace RN  Discussed with Dr Neal Odonnell, who spoke with Dr Viktor Zamora  Subjective/Objective     Subjective:   CC: "I was having pain on my left side "  HPI:  42-year-old female who is postoperative day 0 status post robotic hysterectomy  She has a slight bump in her creatinine and some left flank pain pre operatively  She has had a preoperative CT which showed a large left renal stone  She reports preoperatively his have some left flank pain  She has been voiding well on her own without dysuria or hematuria  Stringer catheter, placed intraoperatively draining clear yellow urine  ROS:  Review of Systems   Constitutional: Negative for activity change and appetite change  HENT: Negative for congestion and ear pain  Eyes: Negative for pain  Respiratory: Negative for cough and shortness of breath  Cardiovascular: Negative for chest pain and palpitations  Gastrointestinal: Negative for abdominal distention, abdominal pain, blood in stool, constipation, diarrhea and nausea  Genitourinary: Positive for flank pain  Negative for difficulty urinating, dysuria and hematuria  Musculoskeletal: Negative for arthralgias and myalgias  Skin: Negative for rash  Allergic/Immunologic: Negative for immunocompromised state  Neurological: Negative for dizziness and headaches  Hematological: Negative for adenopathy  Does not bruise/bleed easily  Psychiatric/Behavioral: Negative for agitation  The patient is not nervous/anxious  Objective:  Vitals: Blood pressure 127/73, pulse 74, temperature 98 2 °F (36 8 °C), temperature source Temporal, resp  rate 16, height 5' 4" (1 626 m), weight 99 8 kg (220 lb), SpO2 97 %, not currently breastfeeding  ,Body mass index is 37 76 kg/m²  Intake/Output Summary (Last 24 hours) at 7/9/2020 1529  Last data filed at 7/9/2020 1335  Gross per 24 hour   Intake 3250 ml   Output 950 ml   Net 2300 ml       Invasive Devices     Peripheral Intravenous Line            Peripheral IV 07/09/20 Right Arm less than 1 day    Peripheral IV 07/09/20 Right Hand less than 1 day          Drain            Urethral Catheter Non-latex; Double-lumen 16 Fr  less than 1 day                Physical Exam   Constitutional: She is oriented to person, place, and time  She appears well-developed and well-nourished  She is cooperative  She does not appear ill  No distress  55-year-old female, resting comfortably in bed  CPAP in place   HENT:   Head: Normocephalic and atraumatic  Moist mucous membranes  Eyes: Conjunctivae and EOM are normal    Neck: Normal range of motion  Neck supple  No tracheal deviation present  Cardiovascular: Normal rate, regular rhythm and normal heart sounds  No murmur heard  Pulmonary/Chest: Effort normal and breath sounds normal  No respiratory distress  She has no wheezes  Good airflow bilaterally on deep inspiration  Abdominal: Soft  Bowel sounds are normal  She exhibits no distension and no mass  There is no tenderness  Abdomen obese, incisions clean dry and intact  Ice to the belly  Normoactive bowel sounds x4 quadrants  Genitourinary:   Genitourinary Comments: Urethral Stringer draining clear yellow urine  Bilateral CVA without tenderness  Musculoskeletal: Normal range of motion  She exhibits no edema  Neurological: She is alert and oriented to person, place, and time     Skin: Skin is warm and dry  No rash noted  She is not diaphoretic  No erythema  No pallor  Psychiatric: She has a normal mood and affect  Her behavior is normal  Judgment and thought content normal    Nursing note and vitals reviewed  History:    Past Medical History:   Diagnosis Date    Anxiety     Asthma     Bipolar disorder ([facility] )     Cancer (HCC)     CPAP (continuous positive airway pressure) dependence     currently using    Depression     Diabetes mellitus (HCC)     NIDDM    GERD (gastroesophageal reflux disease)     Glaucoma     History of transfusion     2x with birth of both babies 1970and 1983, no reaction    Hyperlipidemia     Hypertension     Kidney stone     Obesity     PMB (postmenopausal bleeding)     Shortness of breath     Sleep apnea     Stroke ([facility] ) 2016    Thickened endometrium     Uterus cancer ([facility] )     Wears glasses      Past Surgical History:   Procedure Laterality Date    APPENDECTOMY      CHOLECYSTECTOMY      KIDNEY STONE SURGERY      [place] HYSTEROSCOPY,W/ENDO BX N/A 5/27/2020    Procedure: DILATATION AND CURETTAGE (D&C) WITH HYSTEROSCOPY;  Surgeon: Yari Hills MD;  Location: [facility] OR;  Service: Gynecology    TUBAL LIGATION       History reviewed  No pertinent family history    Social History     Socioeconomic History    Marital status: Single     Spouse name: None    Number of children: None    Years of education: None    Highest education level: None   Occupational History    None   Social Needs    Financial resource strain: None    Food insecurity:     Worry: None     Inability: None    Transportation needs:     Medical: None     Non-medical: None   Tobacco Use    Smoking status: Never Smoker    Smokeless tobacco: Never Used   Substance and Sexual Activity    Alcohol use: Yes     Frequency: Monthly or less     Drinks per session: 1 or 2     Binge frequency: Never    Drug use: Never    Sexual activity: Yes     Partners: Male     Birth control/protection: Female Sterilization   Lifestyle    Physical activity:     Days per week: None     Minutes per session: None    Stress: None   Relationships    Social connections:     Talks on phone: None     Gets together: None     Attends Zoroastrian service: None     Active member of club or organization: None     Attends meetings of clubs or organizations: None     Relationship status: None    Intimate partner violence:     Fear of current or ex partner: None     Emotionally abused: None     Physically abused: None     Forced sexual activity: None   Other Topics Concern    None   Social History Narrative    ** Merged History Encounter **          Imaging:  CT renal stone study 6/25/2020 22 x 21 mm left renal pelvis stone  This was again removed visualized on retrograde pyelogram intraoperatively today by Dr Scott Gates reviewed - both report and images personally reviewed  Lab Results:  I have personally reviewed pertinent labs    Results from last 7 days   Lab Units 07/09/20  1146   WBC Thousand/uL 10 60*   HEMOGLOBIN g/dL 11 6   PLATELETS Thousands/uL 262     Results from last 7 days   Lab Units 07/09/20  1146 07/09/20  0750   SODIUM mmol/L 138 138   POTASSIUM mmol/L 3 6 3 5   CHLORIDE mmol/L 105 104   CO2 mmol/L 25 24   BUN mg/dL 18 20   CREATININE mg/dL 1 22 1 31*   EGFR ml/min/1 73sq m 47 43   CALCIUM mg/dL 8 2* 8 8               Jorge Almanzar PA-C  Date: 7/9/2020 Time: 3:29 PM

## 2020-07-09 NOTE — ASSESSMENT & PLAN NOTE
Large left renal stone measuring 2 2 x 2 1 cm, nonobstructing on retrograde pyelogram by the GYN Oncology team today  I do not think this explains her rising creatinine  She has had some pain preoperatively from this  Will require interval PCNL for definitive stone treatment when she has recovered from her hysterectomy  Will arrange

## 2020-07-10 VITALS
TEMPERATURE: 98.3 F | RESPIRATION RATE: 18 BRPM | OXYGEN SATURATION: 97 % | WEIGHT: 220 LBS | HEIGHT: 64 IN | HEART RATE: 73 BPM | DIASTOLIC BLOOD PRESSURE: 80 MMHG | BODY MASS INDEX: 37.56 KG/M2 | SYSTOLIC BLOOD PRESSURE: 123 MMHG

## 2020-07-10 DIAGNOSIS — I10 ESSENTIAL HYPERTENSION: ICD-10-CM

## 2020-07-10 PROBLEM — Z90.710 HISTORY OF ROBOT-ASSISTED LAPAROSCOPIC HYSTERECTOMY: Status: ACTIVE | Noted: 2020-07-10

## 2020-07-10 LAB
ANION GAP SERPL CALCULATED.3IONS-SCNC: 8 MMOL/L (ref 4–13)
BUN SERPL-MCNC: 12 MG/DL (ref 5–25)
CALCIUM SERPL-MCNC: 8.1 MG/DL (ref 8.3–10.1)
CHLORIDE SERPL-SCNC: 104 MMOL/L (ref 100–108)
CO2 SERPL-SCNC: 27 MMOL/L (ref 21–32)
CREAT SERPL-MCNC: 1.05 MG/DL (ref 0.6–1.3)
ERYTHROCYTE [DISTWIDTH] IN BLOOD BY AUTOMATED COUNT: 14.6 % (ref 11.6–15.1)
GFR SERPL CREATININE-BSD FRML MDRD: 56 ML/MIN/1.73SQ M
GLUCOSE P FAST SERPL-MCNC: 127 MG/DL (ref 65–99)
GLUCOSE SERPL-MCNC: 120 MG/DL (ref 65–140)
GLUCOSE SERPL-MCNC: 127 MG/DL (ref 65–140)
GLUCOSE SERPL-MCNC: 146 MG/DL (ref 65–140)
HCT VFR BLD AUTO: 34.8 % (ref 34.8–46.1)
HGB BLD-MCNC: 10.6 G/DL (ref 11.5–15.4)
MAGNESIUM SERPL-MCNC: 1.6 MG/DL (ref 1.6–2.6)
MCH RBC QN AUTO: 26.9 PG (ref 26.8–34.3)
MCHC RBC AUTO-ENTMCNC: 30.5 G/DL (ref 31.4–37.4)
MCV RBC AUTO: 88 FL (ref 82–98)
PLATELET # BLD AUTO: 225 THOUSANDS/UL (ref 149–390)
PMV BLD AUTO: 11.5 FL (ref 8.9–12.7)
POTASSIUM SERPL-SCNC: 3.7 MMOL/L (ref 3.5–5.3)
RBC # BLD AUTO: 3.94 MILLION/UL (ref 3.81–5.12)
SODIUM SERPL-SCNC: 139 MMOL/L (ref 136–145)
WBC # BLD AUTO: 9.02 THOUSAND/UL (ref 4.31–10.16)

## 2020-07-10 PROCEDURE — 83735 ASSAY OF MAGNESIUM: CPT | Performed by: OBSTETRICS & GYNECOLOGY

## 2020-07-10 PROCEDURE — 94660 CPAP INITIATION&MGMT: CPT

## 2020-07-10 PROCEDURE — 82948 REAGENT STRIP/BLOOD GLUCOSE: CPT

## 2020-07-10 PROCEDURE — 99024 POSTOP FOLLOW-UP VISIT: CPT | Performed by: OBSTETRICS & GYNECOLOGY

## 2020-07-10 PROCEDURE — 85027 COMPLETE CBC AUTOMATED: CPT | Performed by: OBSTETRICS & GYNECOLOGY

## 2020-07-10 PROCEDURE — 80048 BASIC METABOLIC PNL TOTAL CA: CPT | Performed by: OBSTETRICS & GYNECOLOGY

## 2020-07-10 RX ORDER — HYDROCHLOROTHIAZIDE 25 MG/1
25 TABLET ORAL DAILY
Start: 2020-07-12 | End: 2020-07-28 | Stop reason: SDUPTHER

## 2020-07-10 RX ORDER — LISINOPRIL 20 MG/1
20 TABLET ORAL DAILY
Qty: 20 TABLET | Refills: 0
Start: 2020-07-12 | End: 2020-07-28 | Stop reason: SDUPTHER

## 2020-07-10 RX ADMIN — BUPROPION HYDROCHLORIDE 75 MG: 75 TABLET, FILM COATED ORAL at 08:22

## 2020-07-10 RX ADMIN — HEPARIN SODIUM 5000 UNITS: 5000 INJECTION INTRAVENOUS; SUBCUTANEOUS at 05:21

## 2020-07-10 RX ADMIN — ACETAMINOPHEN 975 MG: 325 TABLET ORAL at 05:17

## 2020-07-10 RX ADMIN — SODIUM CHLORIDE, SODIUM GLUCONATE, SODIUM ACETATE, POTASSIUM CHLORIDE, MAGNESIUM CHLORIDE, SODIUM PHOSPHATE, DIBASIC, AND POTASSIUM PHOSPHATE 125 ML/HR: .53; .5; .37; .037; .03; .012; .00082 INJECTION, SOLUTION INTRAVENOUS at 08:23

## 2020-07-10 NOTE — PROGRESS NOTES
Gyn Oncology Progress note   Guero Cisneros 72 y o  female MRN: [id number]  Unit/Bed#: E5 -01 Encounter: [id number]    Assessment: 72 y o  with grade 1 endometrial cancer POD# 1 from [facility], BSO, LND    Plan:  Endometrial cancer, s/p surgery  - F/u final pathology  - Hgb: 11 6 --> 10 6  - UOP:  96cc/kg/hr --> consider removal of lindo if ZOE has resolved  - Encourage incentive spirometer    ZOE  - Baseline Cr 0 8-1 --> 1 3  - Per Nephrology, likely secondary to intraop hypotension on top of CKD 2  - Maintain MAP >65 for renal perfusion  - IV fluids  - AM BMP pending    L renal pelvis non-obstructing stone  - Intraop retrograde pyelogram showing no evidence of obstruction  - Per urology, will require interval PCNL for definitive stone treatment; to be arranged by Urology    Left Leg pain  - Patient reports this has been present for approximately one month  - Pain is located directly under her patella and appears to MSK in nature  - She has no calf tenderness and Russ's sign is negative  - Continue to monitor at this time, low suspicion for DVT at this time  - If patient develops any other symptoms, can consider LE dopplers    DM  - Sliding scale insulin algorithm 3  - -190    HTN  - Home lisinopril held  - Metoprolol XL ordered  -BP: 130-150/70-90    LATIA  - On home CPAP overnight    Asthma  - Albuteral PRN    DVT PPx  - Heparin 5000u TID    FEN  - DM diet  - IV fluids to be discontinued when patient tolerating more PO                   Subjective:    Guero Cisneros has no current complaints  Pain is well controlled  Patient is not currently voiding  She is not ambulating  Patient is currently passing flatus and has had no bowel movement  She is tolerating PO, and denies nausea or vomitting  Patient denies fever, chills, chest pain, shortness of breath, or calf tenderness       /70 (BP Location: Left arm)   Pulse 85   Temp (!) 97 2 °F (36 2 °C) (Temporal)   Resp 18   Ht 5' 4" (1 626 m)   Wt 99 8 kg (220 lb)   SpO2 97%   BMI 37 76 kg/m²     I/O last 3 completed shifts: In: 3758 3 [I V :3758 3]  Out: 950 [Urine:950]  I/O this shift:  In: 602 1 [I V :602 1]  Out: 1150 [Urine:1150]    Lab Results   Component Value Date    WBC 9 02 07/10/2020    HGB 10 6 (L) 07/10/2020    HCT 34 8 07/10/2020    MCV 88 07/10/2020     07/10/2020       Lab Results   Component Value Date    CALCIUM 8 2 (L) 07/09/2020    K 3 6 07/09/2020    CO2 25 07/09/2020     07/09/2020    BUN 18 07/09/2020    CREATININE 1 22 07/09/2020           Physical Exam   Constitutional: She appears well-nourished  HENT:   Head: Normocephalic and atraumatic  Cardiovascular: Normal rate, regular rhythm and normal heart sounds  Pulmonary/Chest: Breath sounds normal  No respiratory distress  She has no wheezes  Abdominal: Soft  Bowel sounds are normal  She exhibits no distension  There is no tenderness  There is no guarding     Incision C/D/I x5   Musculoskeletal:   Left leg tenderness to palpation under left patella  Negative Russ's sign bilaterally  No calf tenderness bilaterally  No erythema or palpable cords bilaterally         Debi Medina MD  7/10/2020  6:34 AM

## 2020-07-10 NOTE — UTILIZATION REVIEW
Initial Clinical Review    Elective   OP   surgical procedure    Age/Sex: 72 y o  female     Surgery Date:    7/9/20    Procedure: ROBOTIC TOTAL HYSTERECTOMY, BILATERAL SALPINGO-OOPHORECTOMY, PELVIC SENTINEL LYMPH NODE BIOPSIES I (N/A)  LYMPHOGRAPHY WITH INDOCYANINE GREEN (ICG) (N/A)  BILATERAL PELVIC EXCISIONAL SENTINEL LYMPH NODE BIOPSIES (N/A)  CYSTOSCOPY WITH RETROGRADE PYELOGRAM (Bilateral    Anesthesia:    general    Operative Findings: Retrograde pyelograms obtained at the beginning of the procedure due to rising creatinine and persistent pain to rule out the possibility of obstructive process associated with recently diagnosed left renal pelvis stone  No evidence of obstruction noted  Approximately 2 cm radiopaque left renal pelvis stone documented  2  After cervical ICG injection; left external iliac, right external iliac and right common iliac sentinel lymph nodes were identified using firefly camera  All those were excised sent for surgical pathology  3  Bilateral adhesions from ovaries to pelvic sidewalls and posterior cul-de-sac  Otherwise grossly normal ovaries  4  Grossly normal left fallopian tube  5  Right fallopian tube densely adherent to posterior cul-de-sac with area of hydrosalpinx identified  6  Approximately 12 week size uterus, grossly normal cervix and no evidence of extra uterine disease  7  At the completion procedure cystoscopy demonstrated bilateral potent ureteral urine jets and no evidence of bladder injuries  Urology consult  ( 7/9)    Found with   Large renal stone on retrograde pyelogram by GYN oncology team day of surgery  Does  Not  Explain rising creatinine  Jamie  Require interval  PCNL for definitive treatment when recovered from hysterectomy      Nephrology consult  ( 7/9)     ZOE post op, likely due to  Intra op hypotension on top of  CKD  Stage 2, baseline  Creatinine   0 8 - 1, up to  1 3, improved with IVF          POD#1 Progress Note:   7/10   Remained overnight to monitor urine output and obtain renal consult per  ZOE protocol  Given recent elevation in creatinine  Continue post op care  Continue  IVF  Monitor  Am labs  Encourage ambulation/incentive spirometry  Pain controlled  + flatus   - BM   Stringer   In,  Will possibly  D/c if  Labs improved        Admission Orders: Date/Time/Statement: Admission Orders (From admission, onward)     Ordered        07/09/20 1109  Inpatient Admission  Once                   Orders Placed This Encounter   Procedures    Inpatient Admission     Standing Status:   Standing     Number of Occurrences:   1     Order Specific Question:   Admitting Physician     Answer:   Mason Cox [[id number]]     Order Specific Question:   Level of Care     Answer:   Med Surg [[id number]]     Order Specific Question:   Estimated length of stay     Answer:   Inpatient Only Surgery     Vital Signs: /78 (BP Location: Left arm)   Pulse 72   Temp (!) 97 2 °F (36 2 °C) (Temporal)   Resp 18   Ht 5' 4" (1 626 m)   Wt 99 8 kg (220 lb)   SpO2 98%   BMI 37 76 kg/m²      Diet:    CCD  diet    Mobility:    OOB  As tolerated    DVT Prophylaxis:    SCD'S    Medications/Pain Control:   Scheduled Medications:    Medications:  acetaminophen 975 mg Oral Q8H [address]   ARIPiprazole 5 mg Oral QPM   atorvastatin 40 mg Oral Daily With Dinner   buPROPion 75 mg Oral QAM   heparin (porcine) 5,000 Units Subcutaneous Q8H [address]   insulin lispro 1-6 Units Subcutaneous TID AC   lamoTRIgine 100 mg Oral HS   metoprolol tartrate 25 mg Oral Daily   pantoprazole 40 mg Oral QPM   senna 1 tablet Oral HS   topiramate 50 mg Oral HS     Continuous IV Infusions:    multi-electrolyte 125 mL/hr Intravenous Continuous     PRN Meds:    albuterol 2 puff Inhalation Q6H PRN   HYDROmorphone 0 2 mg Intravenous Q4H PRN   ondansetron 4 mg Intravenous Q6H PRN   oxyCODONE 10 mg Oral Q4H PRN   oxyCODONE 5 mg Oral Q4H PRN         Network Utilization Review Department  [email or web address]  ATTENTION: Please call with any questions or concerns to [phone] and carefully listen to the prompts so that you are directed to the right person  All voicemails are confidential   [facility] [place] all requests for admission clinical reviews, approved or denied determinations and any other requests to dedicated fax number below belonging to the campus where the patient is receiving treatment   List of dedicated fax numbers for the Facilities:  [address] DENIALS (Administrative/Medical Necessity) [phone]   [address] (Maternity/NICU/Pediatrics) [phone]   Conda Sas [phone]   Jamas Piggs [phone]   Holly Dress [phone]   Levell Pleasure [phone]   [address] [place] [address] [phone]   [facility]  [phone]   [address] [facility] [address] [facility]  [address] [facility] [address] [facility] [phone]

## 2020-07-10 NOTE — TREATMENT PLAN
Renal function has improved s/p isolyte  Continue this if patient with poor oral intake  May stop IVF if tolerating oral diet  Monitor BMP  sCr back to baseline  Continue holding HCTZ/ACEi upon discharge as BP low normal/controlled

## 2020-07-14 ENCOUNTER — TELEPHONE (OUTPATIENT)
Dept: GYNECOLOGIC ONCOLOGY | Facility: CLINIC | Age: 65
End: 2020-07-14

## 2020-07-14 DIAGNOSIS — Z90.710 S/P HYSTERECTOMY WITH OOPHORECTOMY: ICD-10-CM

## 2020-07-14 DIAGNOSIS — Z90.721 S/P HYSTERECTOMY WITH OOPHORECTOMY: ICD-10-CM

## 2020-07-14 RX ORDER — OXYCODONE HYDROCHLORIDE 5 MG/1
5 TABLET ORAL EVERY 6 HOURS PRN
Qty: 5 TABLET | Refills: 0 | Status: SHIPPED | OUTPATIENT
Start: 2020-07-14 | End: 2020-07-24

## 2020-07-14 NOTE — TELEPHONE ENCOUNTER
Post-operative telephone call made to patient  Recovering well from surgery without questions  Stated that she is in extreme pain and tylenol is not helping her  Her pain medications have run out   Transferred call to [facility] regarding pain medication

## 2020-07-15 DIAGNOSIS — I10 ESSENTIAL HYPERTENSION: ICD-10-CM

## 2020-07-16 NOTE — TELEPHONE ENCOUNTER
Will fill med, but this patient of Dr Linda Diez needs a follow up appointment  Will ask staff to schedule

## 2020-07-16 NOTE — TELEPHONE ENCOUNTER
Please reach out to the pt for an appt with any available provider for HTN med refills f/u thank you

## 2020-07-26 ENCOUNTER — DOCUMENTATION (OUTPATIENT)
Dept: OTHER | Facility: HOSPITAL | Age: 65
End: 2020-07-26

## 2020-07-26 NOTE — QUICK NOTE
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

## 2020-07-28 ENCOUNTER — OFFICE VISIT (OUTPATIENT)
Dept: GYNECOLOGIC ONCOLOGY | Facility: CLINIC | Age: 65
End: 2020-07-28
Payer: COMMERCIAL

## 2020-07-28 ENCOUNTER — OFFICE VISIT (OUTPATIENT)
Dept: FAMILY MEDICINE CLINIC | Facility: CLINIC | Age: 65
End: 2020-07-28

## 2020-07-28 VITALS
HEART RATE: 94 BPM | HEIGHT: 64 IN | RESPIRATION RATE: 16 BRPM | BODY MASS INDEX: 37.73 KG/M2 | WEIGHT: 221 LBS | TEMPERATURE: 98 F | OXYGEN SATURATION: 97 % | SYSTOLIC BLOOD PRESSURE: 132 MMHG | DIASTOLIC BLOOD PRESSURE: 86 MMHG

## 2020-07-28 VITALS
TEMPERATURE: 97.4 F | HEIGHT: 64 IN | HEART RATE: 86 BPM | WEIGHT: 220 LBS | DIASTOLIC BLOOD PRESSURE: 84 MMHG | BODY MASS INDEX: 37.56 KG/M2 | SYSTOLIC BLOOD PRESSURE: 138 MMHG | OXYGEN SATURATION: 97 %

## 2020-07-28 DIAGNOSIS — I10 HYPERTENSION, UNSPECIFIED TYPE: ICD-10-CM

## 2020-07-28 DIAGNOSIS — N20.0 NEPHROLITHIASIS: ICD-10-CM

## 2020-07-28 DIAGNOSIS — I10 ESSENTIAL HYPERTENSION: Chronic | ICD-10-CM

## 2020-07-28 DIAGNOSIS — C54.1 ENDOMETRIAL CANCER, GRADE I (HCC): Primary | ICD-10-CM

## 2020-07-28 DIAGNOSIS — N95.0 POSTMENOPAUSAL BLEEDING: ICD-10-CM

## 2020-07-28 DIAGNOSIS — F32.A DEPRESSION, UNSPECIFIED DEPRESSION TYPE: ICD-10-CM

## 2020-07-28 DIAGNOSIS — N17.9 ACUTE KIDNEY INJURY (HCC): ICD-10-CM

## 2020-07-28 DIAGNOSIS — G89.29 CHRONIC PAIN OF LEFT KNEE: ICD-10-CM

## 2020-07-28 DIAGNOSIS — E78.5 HYPERLIPIDEMIA, UNSPECIFIED HYPERLIPIDEMIA TYPE: ICD-10-CM

## 2020-07-28 DIAGNOSIS — Z12.39 SCREENING FOR BREAST CANCER: Primary | ICD-10-CM

## 2020-07-28 DIAGNOSIS — R10.84 GENERALIZED ABDOMINAL PAIN: ICD-10-CM

## 2020-07-28 DIAGNOSIS — E11.9 TYPE 2 DIABETES MELLITUS WITHOUT COMPLICATION, WITHOUT LONG-TERM CURRENT USE OF INSULIN (HCC): ICD-10-CM

## 2020-07-28 DIAGNOSIS — M25.562 CHRONIC PAIN OF LEFT KNEE: ICD-10-CM

## 2020-07-28 DIAGNOSIS — R13.19 ESOPHAGEAL DYSPHAGIA: ICD-10-CM

## 2020-07-28 DIAGNOSIS — K21.9 GASTROESOPHAGEAL REFLUX DISEASE WITHOUT ESOPHAGITIS: ICD-10-CM

## 2020-07-28 DIAGNOSIS — J45.20 MILD INTERMITTENT ASTHMA, UNSPECIFIED WHETHER COMPLICATED: ICD-10-CM

## 2020-07-28 PROBLEM — Z90.710 HISTORY OF ROBOT-ASSISTED LAPAROSCOPIC HYSTERECTOMY: Status: RESOLVED | Noted: 2020-07-10 | Resolved: 2020-07-28

## 2020-07-28 PROBLEM — Z12.11 COLON CANCER SCREENING: Status: RESOLVED | Noted: 2020-02-27 | Resolved: 2020-07-28

## 2020-07-28 PROBLEM — R10.9 ABDOMINAL PAIN: Status: ACTIVE | Noted: 2020-07-28

## 2020-07-28 PROCEDURE — 3066F NEPHROPATHY DOC TX: CPT | Performed by: FAMILY MEDICINE

## 2020-07-28 PROCEDURE — 4010F ACE/ARB THERAPY RXD/TAKEN: CPT | Performed by: NURSE PRACTITIONER

## 2020-07-28 PROCEDURE — 3079F DIAST BP 80-89 MM HG: CPT | Performed by: FAMILY MEDICINE

## 2020-07-28 PROCEDURE — 3008F BODY MASS INDEX DOCD: CPT | Performed by: OBSTETRICS & GYNECOLOGY

## 2020-07-28 PROCEDURE — 4010F ACE/ARB THERAPY RXD/TAKEN: CPT | Performed by: FAMILY MEDICINE

## 2020-07-28 PROCEDURE — 3044F HG A1C LEVEL LT 7.0%: CPT | Performed by: OBSTETRICS & GYNECOLOGY

## 2020-07-28 PROCEDURE — 99024 POSTOP FOLLOW-UP VISIT: CPT | Performed by: OBSTETRICS & GYNECOLOGY

## 2020-07-28 PROCEDURE — 3066F NEPHROPATHY DOC TX: CPT | Performed by: OBSTETRICS & GYNECOLOGY

## 2020-07-28 PROCEDURE — 99214 OFFICE O/P EST MOD 30 MIN: CPT | Performed by: FAMILY MEDICINE

## 2020-07-28 PROCEDURE — 3060F POS MICROALBUMINURIA REV: CPT | Performed by: OBSTETRICS & GYNECOLOGY

## 2020-07-28 PROCEDURE — 3044F HG A1C LEVEL LT 7.0%: CPT | Performed by: FAMILY MEDICINE

## 2020-07-28 PROCEDURE — 3079F DIAST BP 80-89 MM HG: CPT | Performed by: OBSTETRICS & GYNECOLOGY

## 2020-07-28 PROCEDURE — 3075F SYST BP GE 130 - 139MM HG: CPT | Performed by: OBSTETRICS & GYNECOLOGY

## 2020-07-28 PROCEDURE — 3075F SYST BP GE 130 - 139MM HG: CPT | Performed by: FAMILY MEDICINE

## 2020-07-28 PROCEDURE — 1036F TOBACCO NON-USER: CPT | Performed by: OBSTETRICS & GYNECOLOGY

## 2020-07-28 PROCEDURE — 3060F POS MICROALBUMINURIA REV: CPT | Performed by: FAMILY MEDICINE

## 2020-07-28 PROCEDURE — 1036F TOBACCO NON-USER: CPT | Performed by: FAMILY MEDICINE

## 2020-07-28 RX ORDER — ALBUTEROL SULFATE 90 UG/1
2 AEROSOL, METERED RESPIRATORY (INHALATION) EVERY 6 HOURS PRN
Qty: 1 INHALER | Refills: 4 | Status: SHIPPED | OUTPATIENT
Start: 2020-07-28 | End: 2020-11-12 | Stop reason: SDUPTHER

## 2020-07-28 RX ORDER — SERTRALINE HYDROCHLORIDE 100 MG/1
1 TABLET, FILM COATED ORAL EVERY 24 HOURS
COMMUNITY
Start: 2014-02-06 | End: 2020-07-28 | Stop reason: ALTCHOICE

## 2020-07-28 RX ORDER — ATORVASTATIN CALCIUM 20 MG/1
1 TABLET, FILM COATED ORAL EVERY 24 HOURS
COMMUNITY
Start: 2014-02-06 | End: 2020-07-28 | Stop reason: ALTCHOICE

## 2020-07-28 RX ORDER — ALPRAZOLAM 0.5 MG/1
1 TABLET ORAL 3 TIMES DAILY
COMMUNITY
Start: 2014-02-06 | End: 2020-08-05

## 2020-07-28 RX ORDER — HYDROCHLOROTHIAZIDE 25 MG/1
25 TABLET ORAL DAILY
Refills: 4
Start: 2020-07-28 | End: 2020-08-24 | Stop reason: SDUPTHER

## 2020-07-28 RX ORDER — SENNOSIDES 8.6 MG
650 CAPSULE ORAL EVERY 8 HOURS PRN
Qty: 30 TABLET | Refills: 0 | Status: SHIPPED | OUTPATIENT
Start: 2020-07-28 | End: 2020-08-21 | Stop reason: ALTCHOICE

## 2020-07-28 RX ORDER — OMEPRAZOLE 20 MG/1
40 CAPSULE, DELAYED RELEASE ORAL EVERY EVENING
Qty: 60 CAPSULE | Refills: 3 | Status: SHIPPED | OUTPATIENT
Start: 2020-07-28 | End: 2021-01-10

## 2020-07-28 RX ORDER — ASPIRIN AND DIPYRIDAMOLE 25; 200 MG/1; MG/1
CAPSULE, EXTENDED RELEASE ORAL
COMMUNITY
End: 2020-07-29 | Stop reason: ALTCHOICE

## 2020-07-28 RX ORDER — RISPERIDONE 1 MG/ML
SOLUTION ORAL
COMMUNITY
End: 2020-07-28 | Stop reason: ALTCHOICE

## 2020-07-28 RX ORDER — ZOLPIDEM TARTRATE 5 MG/1
1 TABLET ORAL EVERY 24 HOURS
COMMUNITY
Start: 2014-02-06 | End: 2020-07-28 | Stop reason: ALTCHOICE

## 2020-07-28 RX ORDER — OXYCODONE HYDROCHLORIDE 5 MG/1
5 TABLET ORAL EVERY 6 HOURS PRN
Qty: 20 TABLET | Refills: 0 | Status: SHIPPED | OUTPATIENT
Start: 2020-07-28 | End: 2020-08-21 | Stop reason: ALTCHOICE

## 2020-07-28 RX ORDER — ATORVASTATIN CALCIUM 40 MG/1
40 TABLET, FILM COATED ORAL DAILY
Qty: 30 TABLET | Refills: 4 | Status: SHIPPED | OUTPATIENT
Start: 2020-07-28 | End: 2020-11-06

## 2020-07-28 RX ORDER — METOPROLOL SUCCINATE 25 MG/1
1 TABLET, EXTENDED RELEASE ORAL EVERY 24 HOURS
COMMUNITY
Start: 2014-02-06 | End: 2020-07-28 | Stop reason: ALTCHOICE

## 2020-07-28 RX ORDER — LISINOPRIL 20 MG/1
20 TABLET ORAL DAILY
Qty: 20 TABLET | Refills: 4
Start: 2020-07-28

## 2020-07-28 RX ORDER — LISINOPRIL 10 MG/1
1 TABLET ORAL EVERY 24 HOURS
COMMUNITY
Start: 2014-02-06 | End: 2020-07-28 | Stop reason: ALTCHOICE

## 2020-07-28 NOTE — ASSESSMENT & PLAN NOTE
Final pathology results discussed with patient  Tumor conference recommended consideration of vaginal brachytherapy  Patient will be referred to Radiation Oncology for consideration of vaginal brachytherapy after further healing of her vaginal cuff  She is healing well from surgery  Episode of clear vaginal discharge reported  Vaginal exam today demonstrates no evidence to suggest fistula, vaginitis other ongoing discharge  Healing well  Recommended to continue with strict pelvic rest and no heavy lifting  I plan to see her back in 3-4 weeks for vaginal cuff check

## 2020-07-28 NOTE — ASSESSMENT & PLAN NOTE
Postoperative  Generalized  Normal bowel movements  Denies N/V  Reviewed by [facility] gyn surgeons this morning  Pain are improved by Tylenol  8/10 severity  Wounds from laparoscopic incision intact, clean no purulent Discharge, non tender      Oxycodone 5 mg q 6 hourly PRN x 5/7

## 2020-07-28 NOTE — ASSESSMENT & PLAN NOTE
History of CKD 2 with secondary ZOE  Baseline creatinine 0 8-1  Hemoglobin 10 6  GFR improving      DATE - Creatinine / GFR:  7/10/2020 - 1 05 / 56 7/9/2020 - 1 22 / 47 6/25/2020 - 1 05  /  56  3/18/2020 - 0 82  /  76  3/2/2020 - 0 77  /  82    03/02/2020 -   micro albumin:  Creatinine ratio 338   microalbuminuria 839    -For repeat BMP x 6/52, Then review with BMP repeat x 3/12  -antihypertensives restarted with blood pressure today superceding goal systolic blood pressure of 120-130   -patient encouraged to drink copious amounts of fluid at least 30-40 oz per day to maintain adequate renal perfusion  -For review in 3 months

## 2020-07-28 NOTE — ASSESSMENT & PLAN NOTE
Large stone noted  Causing pain  She has been seen by Urology and retrograde pyelogram was performed in the operating room with Dr Paris Figueroa present  He advised against additional intervention at that time    Patient is scheduled to see Urology on August 21st

## 2020-07-28 NOTE — PROGRESS NOTES
Due to language barrier, an  was present during the history-taking and subsequent discussion (and for part of the physical exam) with this patient  dentaZOOM FT#:[id number]    Assessment/Plan:    Left knee pain  Duration x 4/12  Denies any trauma prior to onset of pain to left knee and admits occasional intermittent swelling  No redness  No increased warmth  Normal range of motion  - Xray and review for Physiotherapy (nonweightbearing until after clearance from gyn surgeon given)  - Weight loss advised  - Possible Intrajoint steroid injecion if severe and unable to continue PT  - oxycodone 5 mg Q 6 hourly p r n  Ordered x 5/7  -ice pack advised    [facility] ([facility] )  History of CKD 2 with secondary MICHAEL  Baseline creatinine 0 8-1  Hemoglobin 10 6  GFR improving  DATE - Creatinine / GFR:  7/10/2020 - 1 05 / 56 7/9/2020 - 1 22 / 47 6/25/2020 - 1 05  /  56  3/18/2020 - 0 82  /  76  3/2/2020 - 0 77  /  82    03/02/2020 -   micro albumin:  Creatinine ratio 338   microalbuminuria 839    -For repeat BMP x 6/52, Then review with BMP repeat x 3/12  -antihypertensives restarted with blood pressure today superceding goal systolic blood pressure of 120-130   -patient encouraged to drink copious amounts of fluid at least 30-40 oz per day to maintain adequate renal perfusion  -For review in 3 months    Abdominal pain  Postoperative  Generalized  Normal bowel movements  Denies N/V  Reviewed by [facility] gyn surgeons this morning  Pain are improved by Tylenol  8/10 severity  Wounds from laparoscopic incision intact, clean no purulent Discharge, non tender  Oxycodone 5 mg q 6 hourly PRN x 5/7    Diabetes mellitus ([facility] )    Lab Results   Component Value Date    HGBA1C 6 9 (H) 06/25/2020     Continue management as before    For review with a fasting lipid profile and A1c in 3 months    Essential hypertension  Goal -130 with history of Michael I possibly secondary to intraoperative decrease in blood pressure  Blood pressure today 132/86  To continue antihypertensive therapy lisinopril 20 mg OD, HCTZ 25 mg OD  Maintain a low-salt diet  Encouraged copious p o  hydration to maintain renal perfusion  Endometrial cancer, grade I ([address] )  Continue management as per surgery-for brachy therapy    Nephrolithiasis    Patient awaiting appointment with urology August 21  Depression  Patient reports feeling sad today after receiving news of cancer that requires radiotherapy  However she admits to good family support -no suicidal ideations  And otherwise reports her mood as being good apart from today after hearing the news regarding her condition  Diagnoses and all orders for this visit:    Screening for breast cancer  -     Mammo screening bilateral w cad; Future    Chronic pain of left knee  -     XR knee 3 vw left non injury; Future  -     acetaminophen (TYLENOL) 650 mg CR tablet; Take 1 tablet (650 mg total) by mouth every 8 (eight) hours as needed for mild pain    Postmenopausal bleeding    Mild intermittent asthma, unspecified whether complicated  -     albuterol (PROVENTIL HFA,VENTOLIN HFA) 90 mcg/act inhaler; Inhale 2 puffs every 6 (six) hours as needed for wheezing or shortness of breath    Hyperlipidemia, unspecified hyperlipidemia type  -     atorvastatin (LIPITOR) 40 mg tablet; Take 1 tablet (40 mg total) by mouth daily    Essential hypertension  -     hydrochlorothiazide (HYDRODIURIL) 25 mg tablet; Take 1 tablet (25 mg total) by mouth daily  -     metoprolol tartrate (LOPRESSOR) 25 mg tablet; Take 1 tablet (25 mg total) by mouth daily    Type 2 diabetes mellitus without complication, without long-term current use of insulin (HCC)  -     Basic metabolic panel; Future  -     Basic metabolic panel; Future  -     HEMOGLOBIN A1C W/ EAG ESTIMATION; Future  -     Lipid panel; Future  -     metFORMIN (GLUCOPHAGE) 500 mg tablet;  Take 1 tablet (500 mg total) by mouth 2 (two) times a day with meals    Gastroesophageal reflux disease without esophagitis  -     omeprazole (PriLOSEC) 20 mg delayed release capsule; Take 2 capsules (40 mg total) by mouth every evening    Esophageal dysphagia  -     omeprazole (PriLOSEC) 20 mg delayed release capsule; Take 2 capsules (40 mg total) by mouth every evening    Hypertension, unspecified type  -     lisinopril (ZESTRIL) 20 mg tablet; Take 1 tablet (20 mg total) by mouth daily    Acute kidney injury ([address] )    Generalized abdominal pain  -     oxyCODONE (ROXICODONE) 5 mg immediate release tablet; Take 1 tablet (5 mg total) by mouth every 6 (six) hours as needed for moderate painMax Daily Amount: 20 mg    Depression, unspecified depression type    Other orders  -     Discontinue: metoprolol succinate (TOPROL-XL) 25 mg 24 hr tablet; 1 tablet every 24 hours  -     Discontinue: zolpidem (Ambien) 5 mg tablet; 1 tablet every 24 hours  -     Discontinue: sertraline (Zoloft) 100 mg tablet; 1 tablet every 24 hours  -     Discontinue: risperiDONE (RisperDAL) 1 mg/mL oral solution; take 2 milliliter by oral route 2 times every day  -     Discontinue: aspirin-dipyridamole (Aggrenox)  mg per 12 hr capsule; take 1 capsule by oral route 2 times every day in the morning and evening  -     ALPRAZolam (Xanax) 0 5 mg tablet; 1 tablet 3 (three) times a day  -     Discontinue: lisinopril (ZESTRIL) 10 mg tablet; 1 tablet every 24 hours  -     Discontinue: atorvastatin (LIPITOR) 20 mg tablet; 1 tablet every 24 hours          Subjective:      Patient ID: Vibha Tinoco is a 72 y o  female  This patient presents for refill of her prescriptions  On further probing she admits to having generalized 8-9 out of 10 abdominal pain nonradiating, unrelated to food ingestion, and unrelieved by Tylenol but improved with oxycodone given after her abdominal surgery  She has normal bowel habits at least every 3 days with no blood in the stool  She has no urinary symptoms    No nausea or vomiting  Patient also complains of 4 month history of left knee pain not associated with any traumatic incident  The pain to the knee is worse after standing or walking all day  The left knee gets swollen mildly sometimes  This pain also is minimally improved with Tylenol  She has normal range of motion to the left knee  No other joint pain complaints  The following portions of the patient's history were reviewed and updated as appropriate: allergies, current medications, past family history, past medical history, past social history, past surgical history and problem list     Review of Systems   Constitutional: Positive for appetite change  Negative for fever  HENT: Negative for congestion, ear pain, rhinorrhea and sore throat  Respiratory: Positive for shortness of breath (when she walks a lot)  Negative for cough, chest tightness and wheezing  Cardiovascular: Positive for chest pain (when she walks a lot)  Negative for palpitations  Gastrointestinal: Positive for abdominal pain (general post operation)  Negative for constipation, diarrhea, nausea and vomiting  Genitourinary: Positive for dysuria (x2/52  noted externally (outside a vagina))  Negative for frequency, hematuria, urgency, vaginal bleeding and vaginal discharge  Musculoskeletal: Positive for arthralgias (left knee)  Negative for myalgias  Skin: Negative for rash  Neurological: Negative for dizziness and headaches  Psychiatric/Behavioral: Negative for behavioral problems, dysphoric mood and sleep disturbance  Objective:      /86 (BP Location: Left arm, Patient Position: Sitting, Cuff Size: Large)   Pulse 94   Temp 98 °F (36 7 °C) (Temporal)   Resp 16   Ht 5' 4" (1 626 m)   Wt 100 kg (221 lb)   SpO2 97%   BMI 37 93 kg/m²          Physical Exam   Constitutional: She appears well-developed and well-nourished  No distress  Neck: Normal range of motion     Cardiovascular: Normal rate, regular rhythm and normal heart sounds  Exam reveals no gallop and no friction rub  No murmur heard  Pulmonary/Chest: Effort normal and breath sounds normal  No respiratory distress  She has no wheezes  She has no rales  She exhibits no tenderness  Abdominal: Soft  Bowel sounds are normal  She exhibits no distension and no mass  There is tenderness (Mild pain to left umbilical Region on palpation  )  There is no rebound and no guarding  No hernia  Musculoskeletal: Normal range of motion  She exhibits tenderness ( to medial joint line of left knee)  She exhibits no edema or deformity  Left knee: She exhibits no swelling, no effusion, no erythema and no bony tenderness  Tenderness found  Medial joint line tenderness noted  Neurological: She is alert  Skin: Skin is warm and dry     Psychiatric: Her behavior is normal  Thought content normal

## 2020-07-28 NOTE — ASSESSMENT & PLAN NOTE
Duration x 4/12  Denies any trauma prior to onset of pain to left knee and admits occasional intermittent swelling  No redness  No increased warmth  Normal range of motion  - Xray and review for Physiotherapy (nonweightbearing until after clearance from gyn surgeon given)  - Weight loss advised  - Possible Intrajoint steroid injecion if severe and unable to continue PT  - oxycodone 5 mg Q 6 hourly p r n   Ordered x 5/7  -ice pack advised

## 2020-07-28 NOTE — LETTER
July 28, 2020     Alla Quinn MD  [address] [place] [address]  [address]    Patient: Gregorio Perez   YOB: 1955   Date of Visit: 7/28/2020       Dear Dr Aleida Alegria: Thank you for referring Gregorio Perez to me for evaluation  Below are my notes for this consultation  If you have questions, please do not hesitate to call me  I look forward to following your patient along with you  Sincerely,        Kong Rose MD        CC: No Recipients  Kong Rose MD  7/28/2020 11:36 AM  Incomplete  Assessment/Plan:    Problem List Items Addressed This Visit        Genitourinary    Endometrial cancer, grade I ([address] ) - Primary     Final pathology results discussed with patient  Tumor conference recommended consideration of vaginal brachytherapy  Patient will be referred to Radiation Oncology for consideration of vaginal brachytherapy after further healing of her vaginal cuff  She is healing well from surgery  Episode of clear vaginal discharge reported  Vaginal exam today demonstrates no evidence to suggest fistula, vaginitis other ongoing discharge  Healing well  Recommended to continue with strict pelvic rest and no heavy lifting  I plan to see her back in 3-4 weeks for vaginal cuff check  Nephrolithiasis     Large stone noted  Causing pain  She has been seen by Urology and retrograde pyelogram was performed in the operating room with Dr Zain Soto present  He advised against additional intervention at that time  Patient is scheduled to see Urology on August 21st                  CHIEF COMPLAINT:   Postoperative follow-up, discussion of treatment options for endometrial cancer      Problem:  Cancer Staging  Endometrial cancer, grade I ([address] )  Staging form: Corpus Uteri - Carcinoma, AJCC 8th Edition  - Pathologic stage from 7/9/2020: FIGO Stage IB (pT1b, pN0(sn), cM0) - Signed by Kong Rose MD on 7/28/2020        Previous therapy: Endometrial cancer, grade I ([facility] )    6/2/2020 Initial Diagnosis     Endometrial cancer, grade I ([facility] )      7/9/2020 -  Cancer Staged     Staging form: Corpus Uteri - Carcinoma, AJCC 8th Edition  - Pathologic stage from 7/9/2020: FIGO Stage IB (pT1b, pN0(sn), cM0) - Signed by Yulisa Clements MD on 7/28/2020  Tumor size (mm): 55  Method of lymph node assessment: [place] lymph node biopsy  Histologic grade (G): G1  Histologic grading system: 3 grade system  Lymph-vascular invasion (LVI): LVI not present (absent)/not identified  Residual tumor (R): R0 - None  Histopathologic type: Endometrioid adenocarcinoma, NOS  Diagnostic confirmation: Positive histology  Specimen type: Excision  Staged by: Managing physician  Peritoneal cytology results: Negative  Pelvic naeem status: Negative  Number of pelvic nodes positive from dissection: 0  Number of pelvic nodes examined during dissection: 8  Lymph node metastasis: Absent  Omentectomy performed: No  Morcellation performed: No  National guidelines used in treatment planning: Yes  Type of national guideline used in treatment planning: NCCN        7/9/2020 Surgery     Cystoscopy with retrograde bilateral pyelograms, robotic hysterectomy, bilateral salpingo-oophorectomy, adhesiolysis, sentinel lymph node biopsies  Final pathology demonstrated 5 5 cm grade 1 endometrial tumor with 84% myometrial invasion, low uterine segment involvement noted  No lymphovascular space invasion  Negative cervix  Negative pelvic washings  A sentinel pelvic lymph nodes negative for malignancy  Tumor demonstrated MLH1 and PMS2 loss  MLH1 methylqtion detected  Patient ID: Darya Cortez is a 72 y o  female  HPI  27-year-old menopausal female with grade 1 endometrial cancer  On July 9th underwent cystoscopy with retrograde pyelograms to further evaluate kidney stone as well as robotic hysterectomy, bilateral salpingo-oophorectomy and sentinel lymph node biopsies    Final pathology demonstrated stage IB grade 1 tumor with deep myometrial invasion to 84%  No lymphovascular invasion  Tumor conference recommended consideration of vaginal brachytherapy  Patient is recovering well  Reports single episode of clear vaginal discharge yesterday  Normal urination  No urine leakage  Normal bladder function  No pain  She is scheduled to see Urology on August 21st   The following portions of the patient's history were reviewed and updated as appropriate: allergies, current medications, past family history, past medical history, past social history, past surgical history and problem list     Review of Systems  As per HPI  Twelve point review of systems is otherwise unremarkable    Current Outpatient Medications   Medication Sig Dispense Refill    acetaminophen (TYLENOL) 650 mg CR tablet Take 1 tablet (650 mg total) by mouth every 8 (eight) hours as needed for mild pain (Patient not taking: Reported on 6/16/2020) 30 tablet 0    albuterol (PROVENTIL HFA,VENTOLIN HFA) 90 mcg/act inhaler Inhale 2 puffs every 6 (six) hours as needed for wheezing or shortness of breath 1 Inhaler 0    ALPRAZolam (Xanax) 0 5 mg tablet 1 tablet 3 (three) times a day      ARIPiprazole (ABILIFY) 5 mg tablet Take 5 mg by mouth every evening       aspirin-dipyridamole (Aggrenox)  mg per 12 hr capsule take 1 capsule by oral route 2 times every day in the morning and evening      atorvastatin (LIPITOR) 20 mg tablet 1 tablet every 24 hours      atorvastatin (LIPITOR) 40 mg tablet TAKE ONE TABLET BY MOUTH ONCE DAILY (Patient taking differently: Take 40 mg by mouth daily ) 30 tablet 2    Blood Pressure KIT by Does not apply route daily 1 each 0    buPROPion (WELLBUTRIN) 75 mg tablet Take 75 mg by mouth every morning       Cholecalciferol (VITAMIN D3) 25 MCG (1000 UT) CAPS Take 1,000 Units by mouth daily       glucose blood (FREESTYLE TEST STRIPS) test strip Test 3xs daily 100 each 2    glucose monitoring kit (FREESTYLE) monitoring kit 1 each by Does not apply route 3 (three) times a day TEST 3XS DAILY 1 each 0    hydrochlorothiazide (HYDRODIURIL) 25 mg tablet Take 1 tablet (25 mg total) by mouth daily      lamoTRIgine (LaMICtal) 100 mg tablet Take 100 mg by mouth daily at bedtime      Lancets (FREESTYLE) lancets Test 3xs daily 100 each 2    lisinopril (ZESTRIL) 10 mg tablet 1 tablet every 24 hours      lisinopril (ZESTRIL) 20 mg tablet Take 1 tablet (20 mg total) by mouth daily 20 tablet 0    metFORMIN (GLUCOPHAGE) 500 mg tablet TAKE ONE TABLET BY MOUTH TWICE A DAY WITH MEALS (Patient taking differently: Take 500 mg by mouth 2 (two) times a day with meals ) 60 tablet 2    metoprolol succinate (TOPROL-XL) 25 mg 24 hr tablet 1 tablet every 24 hours      metoprolol tartrate (LOPRESSOR) 25 mg tablet Take 1 tablet (25 mg total) by mouth daily 90 tablet 0    misoprostol (CYTOTEC) 200 mcg tablet Take 1 tablet (200 mcg total) by mouth 4 (four) times a day (Patient not taking: Reported on 6/16/2020) 1 tablet 0    omeprazole (PriLOSEC) 20 mg delayed release capsule Take 2 capsules (40 mg total) by mouth every evening 60 capsule 3    polyethylene glycol (GLYCOLAX) 17 GM/SCOOP powder At 5pm take 5mgx2 dulcolax  At 6pm 32oz miralax in 64oz gatorade  Drink 8oz glass every 5 mins until 32oz finished  Drink remaining as rec  238 g 0    risperiDONE (RisperDAL) 1 mg/mL oral solution take 2 milliliter by oral route 2 times every day      sertraline (Zoloft) 100 mg tablet 1 tablet every 24 hours      topiramate (TOPAMAX) 50 MG tablet Take 50 mg by mouth daily at bedtime      zolpidem (Ambien) 5 mg tablet 1 tablet every 24 hours       No current facility-administered medications for this visit  Objective:    Blood pressure 138/84, pulse 86, temperature (!) 97 4 °F (36 3 °C), temperature source Oral, height 5' 4" (1 626 m), weight 99 8 kg (220 lb), SpO2 97 %, not currently breastfeeding    Body mass index is 37 76 kg/m²  Body surface area is 2 04 meters squared  Physical Exam  Abdomen:  Incisions well-healed  No hernias  Pelvic:  Vagina demonstrates no fluid or discharge  Vaginal cuff intact  No leakage, no blood  No findings to suggest fistula  Surgical pathology results noted      Alpesh Denton MD, [address]  7/28/2020  11:36 AM

## 2020-07-28 NOTE — PROGRESS NOTES
Assessment/Plan:    Problem List Items Addressed This Visit        Genitourinary    Endometrial cancer, grade I ([address] ) - Primary     Final pathology results discussed with patient  Tumor conference recommended consideration of vaginal brachytherapy  Patient will be referred to Radiation Oncology for consideration of vaginal brachytherapy after further healing of her vaginal cuff  She is healing well from surgery  Episode of clear vaginal discharge reported  Vaginal exam today demonstrates no evidence to suggest fistula, vaginitis other ongoing discharge  Healing well  Recommended to continue with strict pelvic rest and no heavy lifting  I plan to see her back in 3-4 weeks for vaginal cuff check  Relevant Orders    Ambulatory referral to Radiation Oncology    Nephrolithiasis     Large stone noted  Causing pain  She has been seen by Urology and retrograde pyelogram was performed in the operating room with Dr Kalina Vanegas present  He advised against additional intervention at that time  Patient is scheduled to see Urology on August 21st              I spent approximately 15 minute in the care of this patient  More than 50% of time was devoted to review pathology results, discussion of methylation studies in light of PMS2 MLH1 loss, indication for referral to Radiation Oncology and consideration of vaginal brachytherapy  All questions were answered to patient's satisfaction  CHIEF COMPLAINT:   Postoperative follow-up, discussion of treatment options for endometrial cancer      Problem:  Cancer Staging  Endometrial cancer, grade I ([address] )  Staging form: Corpus Uteri - Carcinoma, AJCC 8th Edition  - Pathologic stage from 7/9/2020: FIGO Stage IB (pT1b, pN0(sn), cM0) - Signed by Sheila Hobbs MD on 7/28/2020        Previous therapy:     Endometrial cancer, grade I ([address] )    6/2/2020 Initial Diagnosis     Endometrial cancer, grade I ([address] )      7/9/2020 -  Cancer Staged     Staging form: Corpus Uteri - Carcinoma, AJCC 8th Edition  - Pathologic stage from 7/9/2020: FIGO Stage IB (pT1b, pN0(sn), cM0) - Signed by Liyah Hines MD on 7/28/2020  Tumor size (mm): 55  Method of lymph node assessment: Villard lymph node biopsy  Histologic grade (G): G1  Histologic grading system: 3 grade system  Lymph-vascular invasion (LVI): LVI not present (absent)/not identified  Residual tumor (R): R0 - None  Histopathologic type: Endometrioid adenocarcinoma, NOS  Diagnostic confirmation: Positive histology  Specimen type: Excision  Staged by: Managing physician  Peritoneal cytology results: Negative  Pelvic naeem status: Negative  Number of pelvic nodes positive from dissection: 0  Number of pelvic nodes examined during dissection: 8  Lymph node metastasis: Absent  Omentectomy performed: No  Morcellation performed: No  National guidelines used in treatment planning: Yes  Type of national guideline used in treatment planning: NCCN        7/9/2020 Surgery     Cystoscopy with retrograde bilateral pyelograms, robotic hysterectomy, bilateral salpingo-oophorectomy, adhesiolysis, sentinel lymph node biopsies  Final pathology demonstrated 5 5 cm grade 1 endometrial tumor with 84% myometrial invasion, low uterine segment involvement noted  No lymphovascular space invasion  Negative cervix  Negative pelvic washings  A sentinel pelvic lymph nodes negative for malignancy  Tumor demonstrated MLH1 and PMS2 loss  MLH1 methylqtion detected  Patient ID: Kelly Nobles is a 72 y o  female  HPI  66-year-old menopausal female with grade 1 endometrial cancer  On July 9th underwent cystoscopy with retrograde pyelograms to further evaluate kidney stone as well as robotic hysterectomy, bilateral salpingo-oophorectomy and sentinel lymph node biopsies  Final pathology demonstrated stage IB grade 1 tumor with deep myometrial invasion to 84%  No lymphovascular invasion    Tumor conference recommended consideration of vaginal brachytherapy  Patient is recovering well  Reports single episode of clear vaginal discharge yesterday  Normal urination  No urine leakage  Normal bladder function  No pain  She is scheduled to see Urology on August 21st   The following portions of the patient's history were reviewed and updated as appropriate: allergies, current medications, past family history, past medical history, past social history, past surgical history and problem list     Review of Systems  As per HPI  Twelve point review of systems is otherwise unremarkable    Current Outpatient Medications   Medication Sig Dispense Refill    acetaminophen (TYLENOL) 650 mg CR tablet Take 1 tablet (650 mg total) by mouth every 8 (eight) hours as needed for mild pain (Patient not taking: Reported on 6/16/2020) 30 tablet 0    albuterol (PROVENTIL HFA,VENTOLIN HFA) 90 mcg/act inhaler Inhale 2 puffs every 6 (six) hours as needed for wheezing or shortness of breath 1 Inhaler 0    ALPRAZolam (Xanax) 0 5 mg tablet 1 tablet 3 (three) times a day      ARIPiprazole (ABILIFY) 5 mg tablet Take 5 mg by mouth every evening       aspirin-dipyridamole (Aggrenox)  mg per 12 hr capsule take 1 capsule by oral route 2 times every day in the morning and evening      atorvastatin (LIPITOR) 20 mg tablet 1 tablet every 24 hours      atorvastatin (LIPITOR) 40 mg tablet TAKE ONE TABLET BY MOUTH ONCE DAILY (Patient taking differently: Take 40 mg by mouth daily ) 30 tablet 2    Blood Pressure KIT by Does not apply route daily 1 each 0    buPROPion (WELLBUTRIN) 75 mg tablet Take 75 mg by mouth every morning       Cholecalciferol (VITAMIN D3) 25 MCG (1000 UT) CAPS Take 1,000 Units by mouth daily       glucose blood (FREESTYLE TEST STRIPS) test strip Test 3xs daily 100 each 2    glucose monitoring kit (FREESTYLE) monitoring kit 1 each by Does not apply route 3 (three) times a day TEST 3XS DAILY 1 each 0    hydrochlorothiazide (HYDRODIURIL) 25 mg tablet Take 1 tablet (25 mg total) by mouth daily      lamoTRIgine (LaMICtal) 100 mg tablet Take 100 mg by mouth daily at bedtime      Lancets (FREESTYLE) lancets Test 3xs daily 100 each 2    lisinopril (ZESTRIL) 10 mg tablet 1 tablet every 24 hours      lisinopril (ZESTRIL) 20 mg tablet Take 1 tablet (20 mg total) by mouth daily 20 tablet 0    metFORMIN (GLUCOPHAGE) 500 mg tablet TAKE ONE TABLET BY MOUTH TWICE A DAY WITH MEALS (Patient taking differently: Take 500 mg by mouth 2 (two) times a day with meals ) 60 tablet 2    metoprolol succinate (TOPROL-XL) 25 mg 24 hr tablet 1 tablet every 24 hours      metoprolol tartrate (LOPRESSOR) 25 mg tablet Take 1 tablet (25 mg total) by mouth daily 90 tablet 0    misoprostol (CYTOTEC) 200 mcg tablet Take 1 tablet (200 mcg total) by mouth 4 (four) times a day (Patient not taking: Reported on 6/16/2020) 1 tablet 0    omeprazole (PriLOSEC) 20 mg delayed release capsule Take 2 capsules (40 mg total) by mouth every evening 60 capsule 3    polyethylene glycol (GLYCOLAX) 17 GM/SCOOP powder At 5pm take 5mgx2 dulcolax  At 6pm 32oz miralax in 64oz gatorade  Drink 8oz glass every 5 mins until 32oz finished  Drink remaining as rec  238 g 0    risperiDONE (RisperDAL) 1 mg/mL oral solution take 2 milliliter by oral route 2 times every day      sertraline (Zoloft) 100 mg tablet 1 tablet every 24 hours      topiramate (TOPAMAX) 50 MG tablet Take 50 mg by mouth daily at bedtime      zolpidem (Ambien) 5 mg tablet 1 tablet every 24 hours       No current facility-administered medications for this visit  Objective:    Blood pressure 138/84, pulse 86, temperature (!) 97 4 °F (36 3 °C), temperature source Oral, height 5' 4" (1 626 m), weight 99 8 kg (220 lb), SpO2 97 %, not currently breastfeeding  Body mass index is 37 76 kg/m²  Body surface area is 2 04 meters squared  Physical Exam  Abdomen:  Incisions well-healed  No hernias    Pelvic:  Vagina demonstrates no fluid or discharge  Vaginal cuff intact  No leakage, no blood  No findings to suggest fistula  Surgical pathology results noted      Marco A Ramírez MD, [address]  7/28/2020  11:37 AM

## 2020-07-30 NOTE — ASSESSMENT & PLAN NOTE
Patient reports feeling sad today after receiving news of cancer that requires radiotherapy  However she admits to good family support -no suicidal ideations  And otherwise reports her mood as being good apart from today after hearing the news regarding her condition

## 2020-07-30 NOTE — ASSESSMENT & PLAN NOTE
Lab Results   Component Value Date    HGBA1C 6 9 (H) 06/25/2020     Continue management as before    For review with a fasting lipid profile and A1c in 3 months

## 2020-07-30 NOTE — ASSESSMENT & PLAN NOTE
Goal -130 with history of Michael I possibly secondary to intraoperative decrease in blood pressure  Blood pressure today 132/86  To continue antihypertensive therapy lisinopril 20 mg OD, HCTZ 25 mg OD  Maintain a low-salt diet  Encouraged copious p o  hydration to maintain renal perfusion

## 2020-07-31 ENCOUNTER — HOSPITAL ENCOUNTER (OUTPATIENT)
Dept: MAMMOGRAPHY | Facility: CLINIC | Age: 65
Discharge: HOME | End: 2020-07-31
Payer: COMMERCIAL

## 2020-07-31 VITALS — WEIGHT: 221 LBS | HEIGHT: 64 IN | BODY MASS INDEX: 37.73 KG/M2

## 2020-07-31 DIAGNOSIS — Z12.39 SCREENING FOR BREAST CANCER: ICD-10-CM

## 2020-07-31 DIAGNOSIS — Z12.31 ENCOUNTER FOR SCREENING MAMMOGRAM FOR MALIGNANT NEOPLASM OF BREAST: ICD-10-CM

## 2020-07-31 PROCEDURE — 77067 SCR MAMMO BI INCL CAD: CPT

## 2020-07-31 PROCEDURE — 77063 BREAST TOMOSYNTHESIS BI: CPT

## 2020-08-05 ENCOUNTER — CLINICAL SUPPORT (OUTPATIENT)
Dept: RADIATION ONCOLOGY | Facility: CLINIC | Age: 65
End: 2020-08-05
Attending: RADIOLOGY
Payer: COMMERCIAL

## 2020-08-05 VITALS
DIASTOLIC BLOOD PRESSURE: 76 MMHG | TEMPERATURE: 97 F | SYSTOLIC BLOOD PRESSURE: 132 MMHG | BODY MASS INDEX: 36.77 KG/M2 | OXYGEN SATURATION: 97 % | HEART RATE: 70 BPM | WEIGHT: 215.39 LBS | HEIGHT: 64 IN

## 2020-08-05 DIAGNOSIS — C54.1 ENDOMETRIAL CANCER, GRADE I (HCC): Primary | ICD-10-CM

## 2020-08-05 DIAGNOSIS — C54.1 ENDOMETRIAL CANCER, GRADE I (HCC): ICD-10-CM

## 2020-08-05 PROCEDURE — 99211 OFF/OP EST MAY X REQ PHY/QHP: CPT | Performed by: RADIOLOGY

## 2020-08-05 RX ORDER — BUSPIRONE HYDROCHLORIDE 15 MG/1
15 TABLET ORAL 2 TIMES DAILY PRN
COMMUNITY

## 2020-08-05 NOTE — PROGRESS NOTES
Consultation - Radiation Oncology      YOT:[id number] : 1955  Encounter: [id number]  Patient Information: [id number] [facility]  Chief Complaint   Patient presents with    Consult     radiation oncology     Cancer Staging  Endometrial cancer, grade I [facility])  Staging form: Corpus Uteri - Carcinoma, AJCC 8th Edition  - Pathologic stage from 2020: FIGO Stage IB (pT1b, pN0(sn), cM0) - Signed by Varun Silva MD on 2020  Tumor size (mm): 55  Method of lymph node assessment: [place] lymph node biopsy  Histologic grade (G): G1  Histologic grading system: 3 grade system  Lymph-vascular invasion (LVI): LVI not present (absent)/not identified  Residual tumor (R): R0 - None  Histopathologic type: Endometrioid adenocarcinoma, NOS  Diagnostic confirmation: Positive histology  Specimen type: Excision  Staged by: Managing physician  Peritoneal cytology results: Negative  Pelvic naeem status: Negative  Number of pelvic nodes positive from dissection: 0  Number of pelvic nodes examined during dissection: 8  Lymph node metastasis: Absent  Omentectomy performed: No  Morcellation performed: No  National guidelines used in treatment planning: Yes  Type of national guideline used in treatment planning: NCCN           History of Present Illness   Maria M Haskins is a 72y o  year old female who presents with newly diagnosed endometrial cancer       She presented to the ED 2020 with vaginal bleeding x 5 days  She reported bleeding off and on for 3 months  Transvaginal ultrasound completed on 2020 showed an anteverted uterus 12 9 x 5 2 x 5 4cm with a thickened endometrium of 25 mm  She had an EMB attempted in office on 1/15/2020 that resulted in no endometrial tissue  She was scheduled for a hysteroscopy, D&C in 2020 which was delayed d/t COVID   This was eventually performed 20 and revealed Endometrial carcinoma FIGO grade 1      20 Dr Clotilda Severs- She reports approximately 1 year history of worsening postmenopausal bleeding  Reports bleeding as intermittent, typically mild but occasionally with larger volume/clots  Reports some left pelvic colicky pain which is worse when bleeding occurs  Recommended and she has agreed to proceed with robotic hysterectomy, bilateral pelvis sentinel lymph node biopsies vs lymphadenectomy and all other indicated procedures      7/9/20 Cystoscopy with retrograde bilateral pyelograms, robotic hysterectomy, bilateral salpingo-oophorectomy, adhesiolysis, sentinel lymph node biopsies      Post op evaluated by nephrology for ZOE, improved with IVF  Also evaluated by urology for L renal pelvis non-obstructing stone  Plan for interval PCNL     7/26/20 presented at gyn oncology tumor board- She has a 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 status post robotically assisted total laparoscopic hysterectomy and bilateral salpingo-oophorectomy and pelvic sentinel lymph node and mapping   She has noted for MLH1 and MSH2 loss  Gilmar Shah genetic testing is ongoing   Was recommended that she undergo adjuvant vaginal brachytherapy      7/28/20 Dr Santiago Calderón- final pathology results discussed with patient  Tumor conference recommended consideration of vaginal brachytherapy  Will be referred to rad onc for consideration of vaginal brachytherapy after further healing of her vaginal cuff  She is healing well from surgery   Episode of clear vaginal discharge reported   Vaginal exam today demonstrates no evidence to suggest fistula, vaginitis other ongoing discharge   Healing well  Recommended to continue with strict pelvic rest and no heavy lifting   I plan to see her back in 3-4 weeks for vaginal cuff check  Nephrolithiasis, large stone noted  Causing pain  Scheduled to see urology  Patient seen today for consultation with her youngest son who has a twin brother   She is recovering well from surgery but continues to have a decrease in appetite along with some fatigue  She has sleep apnea but does not have a CPAP machine currently  She denies any vaginal bleeding or discharge  She denies any pelvic pains or cramps  She reports some depression regarding her diagnosis  She has a history of depression and bipolar disorder with anxiety and does go to the psychiatric clinic on  [address]  She denies any history of cancer in the past including no skin cancer  She denies any previous radiation therapy and no chemotherapy  She lives with her son and   She has 8 children with 7 sons and 1 daughter who are all healthy without any evidence of cancer  Her mother is 80years old and lives in [facility]   She has no history of any cancer  Her father  of an overdose when he was 36      20 Tawanna Core  20 Dr Tejal Zimmerman  20 colonoscopy & EGD     Historical Information   Oncology History   Endometrial cancer, grade I ([facility] [address] )   2020 Initial Diagnosis    Endometrial cancer, grade I ([facility] [address] )     2020 Biopsy    Final Diagnosis    A  Endometrium, Endometrial currettage:  - Endometrial carcinoma, favor endometrioid type, FIGO grade 1       B  Endocervix, Endocervical currettage:  - Endocervical and squamous epithelium   - Lower uterine segment epithelium with tubal metaplasia  - No dysplasia or cancer is identified            2020 -  Cancer Staged    Staging form: Corpus Uteri - Carcinoma, AJCC 8th Edition  - Pathologic stage from 2020: FIGO Stage IB (pT1b, pN0(sn), cM0) - Signed by Paola Lr MD on 2020  Tumor size (mm): 55  Method of lymph node assessment: [place] lymph node biopsy  Histologic grade (G): G1  Histologic grading system: 3 grade system  Lymph-vascular invasion (LVI): LVI not present (absent)/not identified  Residual tumor (R): R0 - None  Histopathologic type: Endometrioid adenocarcinoma, NOS  Diagnostic confirmation: Positive histology  Specimen type: Excision  Staged by: Managing physician  Peritoneal cytology results: Negative  Pelvic naeem status: Negative  Number of pelvic nodes positive from dissection: 0  Number of pelvic nodes examined during dissection: 8  Lymph node metastasis: Absent  Omentectomy performed: No  Morcellation performed: No  National guidelines used in treatment planning: Yes  Type of national guideline used in treatment planning: NCCN       7/9/2020 Surgery    Cystoscopy with retrograde bilateral pyelograms, robotic hysterectomy, bilateral salpingo-oophorectomy, adhesiolysis, sentinel lymph node biopsies  Final pathology demonstrated 5 5 cm grade 1 endometrial tumor with 84% myometrial invasion, low uterine segment involvement noted  No lymphovascular space invasion  Negative cervix  Negative pelvic washings  A sentinel pelvic lymph nodes negative for malignancy  Tumor demonstrated MLH1 and PMS2 loss  MLH1 methylqtion detected             Past Medical History:   Diagnosis Date    Anxiety     Asthma     [facility]     Cancer (HCC)     CPAP (continuous positive airway pressure) dependence     currently using    Depression     Diabetes mellitus ([facility] )     NIDDM    Endometrial cancer ([facility] )     3/20    GERD (gastroesophageal reflux disease)     Glaucoma     History of transfusion     2x with birth of both babies 1970and 1983, no reaction    Hyperlipidemia     Hypertension     Kidney stone     Obesity     PMB (postmenopausal bleeding)     Shortness of breath     Sleep apnea     Stroke ([facility] ) 2016    Thickened endometrium     Uter[facility] cancer [facility]     Wears glasses      Past Surgical History:   Procedure Laterality Date    APPENDECTOMY      CHOLECYSTECTOMY      KIDNEY STONE SURGERY      LYMPH NODE DISSECTION N/A 7/9/2020    Procedure: BILATERAL PELVIC EXCISIONAL SENTINEL LYMPH NODE BIOPSIES;  Surgeon: Gary Pierre MD;  Location: [facility] OR;  Service: Gynecology Oncology    GA Craig Back Bilateral 7/9/2020    Procedure: CYSTOSCOPY WITH RETROGRADE PYELOGRAM;  Surgeon: Maud Carrel, MD;  [facility];  Service: Gynecology Oncology    [place] HYSTEROSCOPY,W/ENDO BX N/A 5/27/2020    Procedure: DILATATION AND CURETTAGE (D&C) WITH HYSTEROSCOPY;  Surgeon: Isabel Owen MD;  [facility];  Service: Gynecology    [place] INJECTION FOR LYMPHATIC XRAY N/A 7/9/2020    Procedure: LYMPHOGRAPHY WITH INDOCYANINE GREEN (ICG);   Surgeon: Maud Carrel, MD;  [facility];  Service: Gynecology Oncology    [place] LAPAROSCOPY W TOT HYSTERECTUTERUS <=250 [address] TUBE/OVARY N/A 7/9/2020    Procedure: ROBOTIC TOTAL HYSTERECTOMY, BILATERAL SALPINGO-OOPHORECTOMY, PELVIC SENTINEL LYMPH NODE BIOPSIES I;  Surgeon: Maud Carrel, MD;  [facility];  Service: Gynecology Oncology    TUBAL LIGATION         Family History   Problem Relation Age of Onset    No Known Problems Mother     No Known Problems Maternal Grandmother     No Known Problems Paternal Grandmother     No Known Problems Maternal Aunt        Social History   Social History     Substance and Sexual Activity   Alcohol Use Yes    Frequency: Monthly or less    Drinks per session: 1 or 2    Binge frequency: Never     Social History     Substance and Sexual Activity   Drug Use Never     Social History     Tobacco Use   Smoking Status Never Smoker   Smokeless Tobacco Never Used         Meds/Allergies     Current Outpatient Medications:     acetaminophen (TYLENOL) 650 mg CR tablet, Take 1 tablet (650 mg total) by mouth every 8 (eight) hours as needed for mild pain, Disp: 30 tablet, Rfl: 0    albuterol (PROVENTIL HFA,VENTOLIN HFA) 90 mcg/act inhaler, Inhale 2 puffs every 6 (six) hours as needed for wheezing or shortness of breath, Disp: 1 Inhaler, Rfl: 4    atorvastatin (LIPITOR) 40 mg tablet, Take 1 tablet (40 mg total) by mouth daily, Disp: 30 tablet, Rfl: 4    busPIRone (BUSPAR) 15 mg tablet, Take 15 mg by mouth 3 (three) times a day Pt unsure of frequency, Disp: , Rfl:     Cholecalciferol (VITAMIN D3) 25 MCG (1000 UT) CAPS, Take 1,000 Units by mouth daily , Disp: , Rfl:     glucose blood (FREESTYLE TEST STRIPS) test strip, Test 3xs daily, Disp: 100 each, Rfl: 2    glucose monitoring kit (FREESTYLE) monitoring kit, 1 each by Does not apply route 3 (three) times a day TEST 3XS DAILY, Disp: 1 each, Rfl: 0    hydrochlorothiazide (HYDRODIURIL) 25 mg tablet, Take 1 tablet (25 mg total) by mouth daily, Disp: , Rfl: 4    Lancets (FREESTYLE) lancets, Test 3xs daily, Disp: 100 each, Rfl: 2    lisinopril (ZESTRIL) 20 mg tablet, Take 1 tablet (20 mg total) by mouth daily, Disp: 20 tablet, Rfl: 4    metFORMIN (GLUCOPHAGE) 500 mg tablet, Take 1 tablet (500 mg total) by mouth 2 (two) times a day with meals, Disp: 60 tablet, Rfl: 4    metoprolol tartrate (LOPRESSOR) 25 mg tablet, Take 1 tablet (25 mg total) by mouth daily, Disp: 90 tablet, Rfl: 4    omeprazole (PriLOSEC) 20 mg delayed release capsule, Take 2 capsules (40 mg total) by mouth every evening, Disp: 60 capsule, Rfl: 3    oxyCODONE (ROXICODONE) 5 mg immediate release tablet, Take 1 tablet (5 mg total) by mouth every 6 (six) hours as needed for moderate painMax Daily Amount: 20 mg, Disp: 20 tablet, Rfl: 0    Blood Pressure KIT, by Does not apply route daily, Disp: 1 each, Rfl: 0    misoprostol (CYTOTEC) 200 mcg tablet, Take 1 tablet (200 mcg total) by mouth 4 (four) times a day (Patient not taking: Reported on 6/16/2020), Disp: 1 tablet, Rfl: 0  No Known Allergies    Review of Systems   Constitutional: Positive for appetite change (decreased since surgery) and fatigue (9/10)  HENT: Negative  Eyes: Negative  Respiratory: Positive for shortness of breath (sometimes)  Sleep apnea, states she does not have a CPAP right now   Cardiovascular: Positive for chest pain (sometimes) and leg swelling (sometimes with activity)  Gastrointestinal: Positive for constipation     Endocrine: Negative  Genitourinary: Positive for vaginal discharge (reports moderate amount of clear discharge, since surgery)  Negative for vaginal bleeding (not anymore) and vaginal pain  Musculoskeletal: Positive for arthralgias (left knee)  Skin: Negative  Allergic/Immunologic: Negative  Neurological: Positive for dizziness (since surgery)  Hematological: Negative  Psychiatric/Behavioral: Positive for sleep disturbance  Negative for suicidal ideas  Reports feeling depressed d/t diagnosis     OBJECTIVE:   /76   Pulse 70   Temp (!) 97 °F (36 1 °C)   Ht 5' 4"   Wt 97 7 kg (215 lb 6 2 oz)   SpO2 97%   BMI 36 97 kg/m²   Pain Assessment:  7  Performance Status: ECOG/Zubrod/WHO: 1 - Symptomatic but completely ambulatory    Physical Exam  Vitals signs and nursing note reviewed  Constitutional:       General: She is not in acute distress  Appearance: She is well-developed  She is not diaphoretic  HENT:      Head: Normocephalic and atraumatic  Mouth/Throat:      Pharynx: No oropharyngeal exudate  Eyes:      General: No scleral icterus  Conjunctiva/sclera: Conjunctivae normal       Pupils: Pupils are equal, round, and reactive to light  Neck:      Musculoskeletal: Normal range of motion and neck supple  Thyroid: No thyromegaly  Trachea: No tracheal deviation  Cardiovascular:      Rate and Rhythm: Normal rate and regular rhythm  Heart sounds: Normal heart sounds  Pulmonary:      Effort: Pulmonary effort is normal  No respiratory distress  Breath sounds: Normal breath sounds  No wheezing or rales  Abdominal:      General: Bowel sounds are normal  There is no distension  Palpations: Abdomen is soft  There is no mass  Tenderness: There is no abdominal tenderness  Genitourinary:     Comments: Pelvic examination deferred  Musculoskeletal: Normal range of motion  General: No tenderness  Lymphadenopathy:      Cervical: No cervical adenopathy  Upper Body:      Right upper body: No supraclavicular adenopathy  Left upper body: No supraclavicular adenopathy  Lower Body: No right inguinal adenopathy  No left inguinal adenopathy  Skin:     General: Skin is warm and dry  Coloration: Skin is not pale  Findings: No erythema or rash  Neurological:      Mental Status: She is alert and oriented to person, place, and time  Cranial Nerves: No cranial nerve deficit  Coordination: Coordination normal    Psychiatric:         Behavior: Behavior normal          Thought Content: Thought content normal          Judgment: Judgment normal        RESULTS  Lab Results    Chemistry        Component Value Date/Time    K 3 7 07/10/2020 0613     07/10/2020 0613    CO2 27 07/10/2020 0613    BUN 12 07/10/2020 0613    CREATININE 1 05 07/10/2020 0613        Component Value Date/Time    CALCIUM 8 1 (L) 07/10/2020 0613    ALKPHOS 72 03/02/2020 0922    AST 32 03/02/2020 0922    ALT 40 03/02/2020 0922            Lab Results   Component Value Date    WBC 9 02 07/10/2020    HGB 10 6 (L) 07/10/2020    HCT 34 8 07/10/2020    MCV 88 07/10/2020     07/10/2020       Imaging Studies  Fl < 1 Hour    Result Date: 7/10/2020  Narrative: 50 seconds were utilized by the fluoroscopy unit  No films were obtained during this examination  Signed by:  Luanne Granado MD     Mammo Screening Bilateral W 3d & Cad    Result Date: 7/31/2020  Narrative: DIAGNOSIS: Screening for breast cancer; Encounter for screening mammogram for malignant neoplasm of breast TECHNIQUE: Digital screening mammography was performed  Computer Aided Detection (CAD) analyzed all applicable images  COMPARISONS: Prior breast imaging dated: 12/17/2018 RELEVANT HISTORY: Family Breast Cancer History: No known family history of breast cancer  Family Medical History: Family medical history includes endometrial cancer in maternal aunt  Personal History: No known relevant hormone history   No known relevant surgical history  Medical history includes endometrial cancer  The patient is scheduled in a reminder system for screening mammography  8-10% of cancers will be missed on mammography  Management of a palpable abnormality must be based on clinical grounds  Patients will be notified of their results via letter from our facility  Accredited by [employer] of Radiology and FDA  RISK ASSESSMENT: 5 Year Tyrer-Cuzick: 1 62 % 10 Year Tyrer-Cuzick: 2 99 % Lifetime Tyrer-Cuzick: 6 24 % TISSUE DENSITY: There are scattered areas of fibroglandular density  INDICATION: Estefany Abdi is a 72 y o  female presenting for screening mammography  FINDINGS: Bilateral There are no suspicious masses, grouped microcalcifications or areas of architectural distortion  Vascular calcification is identified  The skin and nipple areolar complex are unremarkable  Impression: 1  No mammographic evidence of malignancy  2  Bilateral vascular calcification  As this can be associated with systemic cardiovascular disease, clinical correlation is suggested  ASSESSMENT/BI-RADS CATEGORY: Left: 2 - Benign Right: 2 - Benign Overall: 2 - Benign RECOMMENDATION:      - Routine screening mammogram in 1 year for both breasts  Workstation ID: [id number]       Pathology: See Above      ASSESSMENT  1   Endometrial cancer, grade I ([facility] [address] )  Ambulatory referral to Radiation Oncology    Radiation Simulation Treatment     Cancer Staging  Endometrial cancer, grade I ([facility] [address] )  Staging form: Corpus Uteri - Carcinoma, AJCC 8th Edition  - Pathologic stage from 7/9/2020: FIGO Stage IB (pT1b, pN0(sn), cM0) - Signed by Funmi Medrano MD on 7/28/2020  Tumor size (mm): 55  Method of lymph node assessment: [place] lymph node biopsy  Histologic grade (G): G1  Histologic grading system: 3 grade system  Lymph-vascular invasion (LVI): LVI not present (absent)/not identified  Residual tumor (R): R0 - None  Histopathologic type: Endometrioid adenocarcinoma, NOS  Diagnostic confirmation: Positive histology  Specimen type: Excision  Staged by: Managing physician  Peritoneal cytology results: Negative  Pelvic naeem status: Negative  Number of pelvic nodes positive from dissection: 0  Number of pelvic nodes examined during dissection: 8  Lymph node metastasis: Absent  Omentectomy performed: No  Morcellation performed: No  National guidelines used in treatment planning: Yes  Type of national guideline used in treatment planning: NCCN        PLAN/DISCUSSION  Orders Placed This Encounter   Procedures   [address] Joey Longo is a 72y o  year old female with a stage IB endometrial carcinoma status post hysterectomy for grade 1 endometrioid adenocarcinoma measuring 55 mm with 8 negative pelvic lymph nodes  She is seen today consultation to discuss postoperative cuff vaginal brachytherapy  We discussed she is at increased risk for locally recurrent disease and that postoperative vaginal cuff high-dose rate brachytherapy has been shown to reduce incidence vaginal cuff recurrence  We discussed rationale for treatment including the acute side effects and the potential chronic complications  She does wish to proceed with treatment once she is fully healed from surgery  She will see Dr Kathryn Tabares August 25, 2020 for examination including checking her vaginal cuff  She will return here for simulation and treatment planning purposes August 26, 2020 and will plan for treatment at [facility]  Alisha Knight MD  8/5/2020,10:32 AM      Portions of the record may have been created with voice recognition software  Occasional wrong word or "sound a like" substitutions may have occurred due to the inherent limitations of voice recognition software  Read the chart carefully and recognize, using context, where substitutions have occurred

## 2020-08-05 NOTE — PROGRESS NOTES
Gregorio Perez 1955 is a 72 y o  female     Patient is a 72 y o female with newly diagnosed endometrial cancer  She presented to the ED January 2020 with vaginal bleeding x 5 days  She reported bleeding off and on for 3 months  Transvaginal ultrasound completed on 1/9/2020 showed an anteverted uterus 12 9 x 5 2 x 5 4cm with a thickened endometrium of 25 mm  She had an EMB attempted in office on 1/15/2020 that resulted in no endometrial tissue  She was scheduled for a hysteroscopy, D&C in March 2020 which was delayed d/t COVID  This was eventually performed 5/27/20 and revealed Endometrial carcinoma FIGO grade 1     6/16/20 Dr Suki Mojica- She reports approximately 1 year history of worsening postmenopausal bleeding  Reports bleeding as intermittent, typically mild but occasionally with larger volume/clots  Reports some left pelvic colicky pain which is worse when bleeding occurs  Recommended and she has agreed to proceed with robotic hysterectomy, bilateral pelvis sentinel lymph node biopsies vs lymphadenectomy and all other indicated procedures  7/9/20 Cystoscopy with retrograde bilateral pyelograms, robotic hysterectomy, bilateral salpingo-oophorectomy, adhesiolysis, sentinel lymph node biopsies  Post op evaluated by nephrology for ZOE, improved with IVF  Also evaluated by urology for L renal pelvis non-obstructing stone  Plan for interval PCNL    7/26/20 presented at gyn oncology tumor board- She has a 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 status post robotically assisted total laparoscopic hysterectomy and bilateral salpingo-oophorectomy and pelvic sentinel lymph node and mapping  She has noted for MLH1 and MSH2 loss  Her genetic testing is ongoing  Was recommended that she undergo adjuvant vaginal brachytherapy  7/28/20 Dr Suki Mojica- final pathology results discussed with patient   Tumor conference recommended consideration of vaginal brachytherapy  Will be referred to rad onc for consideration of vaginal brachytherapy after further healing of her vaginal cuff  She is healing well from surgery  Episode of clear vaginal discharge reported  Vaginal exam today demonstrates no evidence to suggest fistula, vaginitis other ongoing discharge  Healing well  Recommended to continue with strict pelvic rest and no heavy lifting  I plan to see her back in 3-4 weeks for vaginal cuff check  Nephrolithiasis, large stone noted  Causing pain  Scheduled to see urology  8/21/20 Mana Menendez  8/25/20 Dr Tracy Parks  9/4/20 colonoscopy & EGD    Oncology History   Endometrial cancer, grade I ([facility] [address] )   5/27/2020 Initial Diagnosis    Endometrial cancer, grade I ([facility] [address] )     5/27/2020 Biopsy    Final Diagnosis    A  Endometrium, Endometrial currettage:  - Endometrial carcinoma, favor endometrioid type, FIGO grade 1       B  Endocervix, Endocervical currettage:  - Endocervical and squamous epithelium   - Lower uterine segment epithelium with tubal metaplasia  - No dysplasia or cancer is identified            7/9/2020 -  Cancer Staged    Staging form: Corpus Uteri - Carcinoma, AJCC 8th Edition  - Pathologic stage from 7/9/2020: FIGO Stage IB (pT1b, pN0(sn), cM0) - Signed by Memo Simmons MD on 7/28/2020  Tumor size (mm): 55  Method of lymph node assessment: [place] lymph node biopsy  Histologic grade (G): G1  Histologic grading system: 3 grade system  Lymph-vascular invasion (LVI): LVI not present (absent)/not identified  Residual tumor (R): R0 - None  Histopathologic type: Endometrioid adenocarcinoma, NOS  Diagnostic confirmation: Positive histology  Specimen type: Excision  Staged by: Managing physician  Peritoneal cytology results: Negative  Pelvic naeem status: Negative  Number of pelvic nodes positive from dissection: 0  Number of pelvic nodes examined during dissection: 8  Lymph node metastasis: Absent  Omentectomy performed: No  Morcellation performed: No  National guidelines used in treatment planning: Yes  Type of national guideline used in treatment planning: NCCN       2020 Surgery    Cystoscopy with retrograde bilateral pyelograms, robotic hysterectomy, bilateral salpingo-oophorectomy, adhesiolysis, sentinel lymph node biopsies  Final pathology demonstrated 5 5 cm grade 1 endometrial tumor with 84% myometrial invasion, low uterine segment involvement noted  No lymphovascular space invasion  Negative cervix  Negative pelvic washings  A sentinel pelvic lymph nodes negative for malignancy  Tumor demonstrated MLH1 and PMS2 loss  MLH1 methylqtion detected  Clinical Trial: no      OB/GYN History:  The patient underwent menarche at 15 years  Menopause Status post  No LMP recorded  Patient is postmenopausal   Menopause at 39 years  Menopause Reason naturally  Hormone replacement therapy: no     7  Para 8  Age at first delivery being 14 years  Nursing: no    Birth control pills: yes    Years used unknown  Pregnancy test needed: no    Health Maintenance   Topic Date Due    Medicare Annual Wellness Visit (AWV)  1955    Diabetic Foot Exam  1965    DM Eye Exam  1965    Colorectal Cancer Screening  2005    Pneumococcal Vaccine: 65+ Years (1 of 1 - PPSV23) 2020    Influenza Vaccine  2020    HEMOGLOBIN A1C  2020    BMI: Followup Plan  2021    Fall Risk  2021    MAMMOGRAM  2021    BMI: Adult  2021    DTaP,Tdap,and Td Vaccines (2 - Td) 2023    HIV Screening  Completed    Hepatitis C Screening  Completed    HIB Vaccine  Aged Out    Hepatitis B Vaccine  Aged Out    IPV Vaccine  Aged Out    Hepatitis A Vaccine  Aged Out    Meningococcal ACWY Vaccine  Aged Out    HPV Vaccine  Aged Out       Past Medical History:   Diagnosis Date    Anxiety     Asthma     [facility] [address] [facility]     Cancer ([facility] [address] [facility]     CPAP (continuous positive airway pressure) dependence currently using    Depression     Diabetes mellitus [facility]     NIDDM    Endometrial cancer [facility]     3/20    GERD (gastroesophageal reflux disease)     Glaucoma     History of transfusion     2x with birth of both babies 1970and 1983, no reaction    Hyperlipidemia     Hypertension     Kidney stone     Obesity     PMB (postmenopausal bleeding)     Shortness of breath     Sleep apnea     Stroke ([facility] [address] ) 2016    Thickened endometrium     Uterus cancer [facility]     Wears glasses        Past Surgical History:   Procedure Laterality Date    APPENDECTOMY      CHOLECYSTECTOMY      KIDNEY STONE SURGERY      LYMPH NODE DISSECTION N/A 7/9/2020    Procedure: BILATERAL PELVIC EXCISIONAL SENTINEL LYMPH NODE BIOPSIES;  Surgeon: Anyi Christensen MD;  [facility];  Service: Gynecology Oncology    KS CYSTOURETHROSCOPY,URETER CATHETER Bilateral 7/9/2020    Procedure: CYSTOSCOPY WITH RETROGRADE PYELOGRAM;  Surgeon: Anyi Christensen MD;  [facility];  Service: Gynecology Oncology    KS HYSTEROSCOPY,W/ENDO BX N/A 5/27/2020    Procedure: DILATATION AND CURETTAGE (D&C) WITH HYSTEROSCOPY;  Surgeon: Liza Mills MD;  [facility];  Service: Gynecology    KS INJECTION FOR LYMPHATIC XRAY N/A 7/9/2020    Procedure: LYMPHOGRAPHY WITH INDOCYANINE GREEN (ICG);   Surgeon: Anyi Christensen MD;  [facility];  Service: Gynecology Oncology    KS LAPAROSCOPY W TOT HYSTERECTUTERUS <=250 [address] TUBE/OVARY N/A 7/9/2020    Procedure: ROBOTIC TOTAL HYSTERECTOMY, BILATERAL SALPINGO-OOPHORECTOMY, PELVIC SENTINEL LYMPH NODE BIOPSIES I;  Surgeon: Anyi Christensen MD;  [facility];  Service: Gynecology Oncology    TUBAL LIGATION         Family History   Problem Relation Age of Onset    No Known Problems Mother     No Known Problems Maternal Grandmother     No Known Problems Paternal Grandmother     No Known Problems Maternal Aunt        Social History     Tobacco Use    Smoking status: Never Smoker    Smokeless tobacco: Never Used   Substance Use Topics    Alcohol use: Yes     Frequency: Monthly or less     Drinks per session: 1 or 2     Binge frequency: Never    Drug use: Never          Current Outpatient Medications:     acetaminophen (TYLENOL) 650 mg CR tablet, Take 1 tablet (650 mg total) by mouth every 8 (eight) hours as needed for mild pain, Disp: 30 tablet, Rfl: 0    albuterol (PROVENTIL HFA,VENTOLIN HFA) 90 mcg/act inhaler, Inhale 2 puffs every 6 (six) hours as needed for wheezing or shortness of breath, Disp: 1 Inhaler, Rfl: 4    atorvastatin (LIPITOR) 40 mg tablet, Take 1 tablet (40 mg total) by mouth daily, Disp: 30 tablet, Rfl: 4    busPIRone (BUSPAR) 15 mg tablet, Take 15 mg by mouth 3 (three) times a day Pt unsure of frequency, Disp: , Rfl:     Cholecalciferol (VITAMIN D3) 25 MCG (1000 UT) CAPS, Take 1,000 Units by mouth daily , Disp: , Rfl:     glucose blood (FREESTYLE TEST STRIPS) test strip, Test 3xs daily, Disp: 100 each, Rfl: 2    glucose monitoring kit (FREESTYLE) monitoring kit, 1 each by Does not apply route 3 (three) times a day TEST 3XS DAILY, Disp: 1 each, Rfl: 0    hydrochlorothiazide (HYDRODIURIL) 25 mg tablet, Take 1 tablet (25 mg total) by mouth daily, Disp: , Rfl: 4    Lancets (FREESTYLE) lancets, Test 3xs daily, Disp: 100 each, Rfl: 2    lisinopril (ZESTRIL) 20 mg tablet, Take 1 tablet (20 mg total) by mouth daily, Disp: 20 tablet, Rfl: 4    metFORMIN (GLUCOPHAGE) 500 mg tablet, Take 1 tablet (500 mg total) by mouth 2 (two) times a day with meals, Disp: 60 tablet, Rfl: 4    metoprolol tartrate (LOPRESSOR) 25 mg tablet, Take 1 tablet (25 mg total) by mouth daily, Disp: 90 tablet, Rfl: 4    omeprazole (PriLOSEC) 20 mg delayed release capsule, Take 2 capsules (40 mg total) by mouth every evening, Disp: 60 capsule, Rfl: 3    oxyCODONE (ROXICODONE) 5 mg immediate release tablet, Take 1 tablet (5 mg total) by mouth every 6 (six) hours as needed for moderate painMax Daily Amount: 20 mg, Disp: 20 tablet, Rfl: 0    Blood Pressure KIT, by Does not apply route daily, Disp: 1 each, Rfl: 0    misoprostol (CYTOTEC) 200 mcg tablet, Take 1 tablet (200 mcg total) by mouth 4 (four) times a day (Patient not taking: Reported on 6/16/2020), Disp: 1 tablet, Rfl: 0    No Known Allergies     Review of Systems:  Review of Systems   Constitutional: Positive for appetite change (decreased since surgery) and fatigue (9/10)  HENT: Negative  Eyes: Negative  Respiratory: Positive for shortness of breath (sometimes)  Sleep apnea, states she does not have a CPAP right now   Cardiovascular: Positive for chest pain (sometimes) and leg swelling (sometimes with activity)  Gastrointestinal: Positive for constipation  Endocrine: Negative  Genitourinary: Positive for vaginal discharge (reports moderate amount of clear discharge, since surgery)  Negative for vaginal bleeding (not anymore) and vaginal pain  Musculoskeletal: Positive for arthralgias (left knee)  Skin: Negative  Allergic/Immunologic: Negative  Neurological: Positive for dizziness (since surgery)  Hematological: Negative  Psychiatric/Behavioral: Positive for sleep disturbance  Negative for suicidal ideas  Reports feeling depressed d/t diagnosis       Vitals:    08/05/20 0939   BP: 132/76   Pulse: 70   Temp: (!) 97 °F (36 1 °C)   SpO2: 97%   Weight: 97 7 kg (215 lb 6 2 oz)   Height: 5' 4"       Pain Score:   7    Imaging:No images are attached to the encounter       Teaching N[employer] RT packet given

## 2020-08-21 ENCOUNTER — TELEPHONE (OUTPATIENT)
Dept: GYNECOLOGIC ONCOLOGY | Facility: CLINIC | Age: 65
End: 2020-08-21

## 2020-08-21 ENCOUNTER — OFFICE VISIT (OUTPATIENT)
Dept: UROLOGY | Facility: MEDICAL CENTER | Age: 65
End: 2020-08-21
Payer: COMMERCIAL

## 2020-08-21 VITALS
HEART RATE: 73 BPM | SYSTOLIC BLOOD PRESSURE: 124 MMHG | HEIGHT: 64 IN | TEMPERATURE: 97.3 F | BODY MASS INDEX: 37.73 KG/M2 | WEIGHT: 221 LBS | DIASTOLIC BLOOD PRESSURE: 82 MMHG

## 2020-08-21 DIAGNOSIS — N20.0 KIDNEY STONE: Primary | ICD-10-CM

## 2020-08-21 LAB
BACTERIA UR QL AUTO: ABNORMAL /HPF
BILIRUB UR QL STRIP: NEGATIVE
CLARITY UR: ABNORMAL
COLOR UR: YELLOW
GLUCOSE UR STRIP-MCNC: NEGATIVE MG/DL
HGB UR QL STRIP.AUTO: ABNORMAL
HYALINE CASTS #/AREA URNS LPF: ABNORMAL /LPF
KETONES UR STRIP-MCNC: NEGATIVE MG/DL
LEUKOCYTE ESTERASE UR QL STRIP: ABNORMAL
NITRITE UR QL STRIP: NEGATIVE
NON-SQ EPI CELLS URNS QL MICRO: ABNORMAL /HPF
PH UR STRIP.AUTO: 5.5 [PH]
PROT UR STRIP-MCNC: ABNORMAL MG/DL
RBC #/AREA URNS AUTO: ABNORMAL /HPF
SL AMB  POCT GLUCOSE, UA: ABNORMAL
SL AMB LEUKOCYTE ESTERASE,UA: ABNORMAL
SL AMB POCT BILIRUBIN,UA: ABNORMAL
SL AMB POCT BLOOD,UA: ABNORMAL
SL AMB POCT CLARITY,UA: CLEAR
SL AMB POCT COLOR,UA: YELLOW
SL AMB POCT KETONES,UA: ABNORMAL
SL AMB POCT NITRITE,UA: ABNORMAL
SL AMB POCT PH,UA: 5.5
SL AMB POCT SPECIFIC GRAVITY,UA: 1.02
SL AMB POCT URINE PROTEIN: ABNORMAL
SL AMB POCT UROBILINOGEN: 0.2
SP GR UR STRIP.AUTO: 1.03 (ref 1–1.03)
UROBILINOGEN UR QL STRIP.AUTO: 0.2 E.U./DL
WBC #/AREA URNS AUTO: ABNORMAL /HPF

## 2020-08-21 PROCEDURE — 3079F DIAST BP 80-89 MM HG: CPT | Performed by: NURSE PRACTITIONER

## 2020-08-21 PROCEDURE — 81001 URINALYSIS AUTO W/SCOPE: CPT | Performed by: NURSE PRACTITIONER

## 2020-08-21 PROCEDURE — 3074F SYST BP LT 130 MM HG: CPT | Performed by: NURSE PRACTITIONER

## 2020-08-21 PROCEDURE — 81003 URINALYSIS AUTO W/O SCOPE: CPT | Performed by: NURSE PRACTITIONER

## 2020-08-21 PROCEDURE — 87086 URINE CULTURE/COLONY COUNT: CPT | Performed by: NURSE PRACTITIONER

## 2020-08-21 PROCEDURE — 3060F POS MICROALBUMINURIA REV: CPT | Performed by: NURSE PRACTITIONER

## 2020-08-21 PROCEDURE — 3044F HG A1C LEVEL LT 7.0%: CPT | Performed by: NURSE PRACTITIONER

## 2020-08-21 PROCEDURE — 3066F NEPHROPATHY DOC TX: CPT | Performed by: NURSE PRACTITIONER

## 2020-08-21 PROCEDURE — 99214 OFFICE O/P EST MOD 30 MIN: CPT | Performed by: NURSE PRACTITIONER

## 2020-08-21 PROCEDURE — 3061F NEG MICROALBUMINURIA REV: CPT | Performed by: FAMILY MEDICINE

## 2020-08-21 PROCEDURE — 3008F BODY MASS INDEX DOCD: CPT | Performed by: NURSE PRACTITIONER

## 2020-08-21 PROCEDURE — 3061F NEG MICROALBUMINURIA REV: CPT | Performed by: NURSE PRACTITIONER

## 2020-08-21 PROCEDURE — 1036F TOBACCO NON-USER: CPT | Performed by: NURSE PRACTITIONER

## 2020-08-21 NOTE — TELEPHONE ENCOUNTER
Called patient to go over screening questions for appointment on 8/25  All answers were negative  Patient aware of masking policy and 1 visitor policy as well

## 2020-08-21 NOTE — H&P
Pre-op visit  8/21/2020      Chief Complaint   Patient presents with    Nephrolithiasis     Assessment and Plan     72 y o  female managed by new patient  1  Nephrolithiasis  · CT stone study performed 06/25/2020 reveals a 22 x 21 mm left renal pelvis calculi with no overt hydronephrosis noted  · Recommend PCNL  · OR case requested  · Urinalysis with microscopy and urine culture ordered  · Patient will need medical clearance prior to surgery    History and physical was performed for the patients upcoming PCNL scheduled with Dr Kalina Vanegas  All questions and concerns regarding surgery have been addressed and answered  Will proceed with surgery as planned  History of Present Illness  Ata Hernandez is a 72 y o  female here for history and physical prior to their upcoming PCNL  The patient has had no overall changes in their health since their last visit and denies any prior complications with anesthesia  Patient with a significant past medical history of grade 1 endometrial cancer and is status post TAHBSO, sent no pelvic node biopsy, cystoscopy with retrograde pyelogram with Dr Fareed Winters 07/09/2020  On diagnostic imaging, patient was noted to have a 22 by 21 mm left renal pelvis calculi with no ever hydronephrosis noted  She reports a significant past medical history of nephrolithiasis with  instrumentation on the right side  She currently denies all lower urinary tract symptoms  She does complain of left flank pain and discomfort  Denies fever and chills  Most recent BMP performed 07/10/2020 reveals BUN of 12 and creatinine 1 05    Patient has complications secondary to anesthesia  She denies recent onset of chest pain, dizziness  She does report occasional shortness of breath with an underlying history of asthma which has been well controlled and she is using inhalers daily  Review of Systems   Constitutional: Negative for chills and fever     Respiratory: Positive for shortness of breath  Negative for cough  Cardiovascular: Negative for chest pain  Gastrointestinal: Negative for abdominal distention, abdominal pain, blood in stool, nausea and vomiting  Genitourinary: Positive for flank pain (Left)  Negative for difficulty urinating, dysuria, enuresis, frequency, hematuria and urgency  Musculoskeletal: Negative for back pain  Skin: Negative for rash  Neurological: Negative for dizziness, light-headedness and headaches  Past Medical History  Past Medical History:   Diagnosis Date    Anxiety     Asthma     Bipolar disorder ([place] [address] )     Cancer (HCC)     CPAP (continuous positive airway pressure) dependence     currently using    Depression     Diabetes mellitus ([place] [address] )     NIDDM    Endometrial cancer ([place] [address] )     3/20    GERD (gastroesophageal reflux disease)     Glaucoma     History of transfusion     2x with birth of both babies 1970and 1983, no reaction    Hyperlipidemia     Hypertension     Kidney stone     Obesity     PMB (postmenopausal bleeding)     Shortness of breath     Sleep apnea     Stroke ([place] [address] ) 2016    Thickened endometrium     Uterus cancer ([place] [address] )     Wears glasses        Past Social History  Past Surgical History:   Procedure Laterality Date    APPENDECTOMY      CHOLECYSTECTOMY      KIDNEY STONE SURGERY      LYMPH NODE DISSECTION N/A 7/9/2020    Procedure: BILATERAL PELVIC EXCISIONAL SENTINEL LYMPH NODE BIOPSIES;  Surgeon: Pj Andrew MD;  [facility];  Service: Gynecology Oncology    DE CYSTOURETHROSCOPY,URETER CATHETER Bilateral 7/9/2020    Procedure: CYSTOSCOPY WITH RETROGRADE PYELOGRAM;  Surgeon: Pj Andrew MD;  [facility];  Service: Gynecology Oncology    DE HYSTEROSCOPY,W/ENDO BX N/A 5/27/2020    Procedure: DILATATION AND CURETTAGE (D&C) WITH HYSTEROSCOPY;  Surgeon:  Edy Jones MD;  [facility];  Service: Gynecology    DE INJECTION FOR LYMPHATIC XRAY N/A 7/9/2020    Procedure: Lizz Granado WITH INDOCYANINE GREEN (ICG);   Surgeon: Funmi Medrano MD;  [facility];  Service: Gynecology Oncology    NC LAPAROSCOPY [place] <=250 GRAM  W TUBE/OVARY N/A 7/9/2020    Procedure: ROBOTIC TOTAL HYSTERECTOMY, BILATERAL SALPINGO-OOPHORECTOMY, PELVIC SENTINEL LYMPH NODE BIOPSIES I;  Surgeon: Funmi Medrano MD;  [facility];  Service: Gynecology Oncology    TUBAL LIGATION         Past Family History  Family History   Problem Relation Age of Onset    No Known Problems Mother     No Known Problems Maternal Grandmother     No Known Problems Paternal Grandmother     No Known Problems Maternal Aunt        Past Social history  Social History     Socioeconomic History    Marital status: Single     Spouse name: Not on file    Number of children: Not on file    Years of education: Not on file    Highest education level: Not on file   Occupational History    Not on file   Social Needs    Financial resource strain: Not on file    Food insecurity     Worry: Not on file     Inability: Not on file    Transportation needs     Medical: Not on file     Non-medical: Not on file   Tobacco Use    Smoking status: Never Smoker    Smokeless tobacco: Never Used   Substance and Sexual Activity    Alcohol use: Yes     Frequency: Monthly or less     Drinks per session: 1 or 2     Binge frequency: Never    Drug use: Never    Sexual activity: Yes     Partners: Male     Birth control/protection: Female Sterilization   Lifestyle    Physical activity     Days per week: Not on file     Minutes per session: Not on file    Stress: Not on file   Relationships    Social connections     Talks on phone: Not on file     Gets together: Not on file     Attends [employer] service: Not on file     Active member of club or organization: Not on file     Attends meetings of clubs or organizations: Not on file     Relationship status: Not on file    Intimate partner violence     Fear of current or ex partner: Not on file     Emotionally abused: Not on file     Physically abused: Not on file     Forced sexual activity: Not on file   Other Topics Concern    Not on file   Social History Narrative    ** Merged History Encounter **            Current Medications  Current Outpatient Medications   Medication Sig Dispense Refill    atorvastatin (LIPITOR) 40 mg tablet Take 1 tablet (40 mg total) by mouth daily 30 tablet 4    Blood Pressure KIT by Does not apply route daily 1 each 0    busPIRone (BUSPAR) 15 mg tablet Take 15 mg by mouth 3 (three) times a day Pt unsure of frequency      Cholecalciferol (VITAMIN D3) 25 MCG (1000 UT) CAPS Take 1,000 Units by mouth daily       glucose blood (FREESTYLE TEST STRIPS) test strip Test 3xs daily 100 each 2    glucose monitoring kit (FREESTYLE) monitoring kit 1 each by Does not apply route 3 (three) times a day TEST 3XS DAILY 1 each 0    hydrochlorothiazide (HYDRODIURIL) 25 mg tablet Take 1 tablet (25 mg total) by mouth daily  4    Lancets (FREESTYLE) lancets Test 3xs daily 100 each 2    lisinopril (ZESTRIL) 20 mg tablet Take 1 tablet (20 mg total) by mouth daily 20 tablet 4    metFORMIN (GLUCOPHAGE) 500 mg tablet Take 1 tablet (500 mg total) by mouth 2 (two) times a day with meals 60 tablet 4    metoprolol tartrate (LOPRESSOR) 25 mg tablet Take 1 tablet (25 mg total) by mouth daily 90 tablet 4    omeprazole (PriLOSEC) 20 mg delayed release capsule Take 2 capsules (40 mg total) by mouth every evening 60 capsule 3    albuterol (PROVENTIL HFA,VENTOLIN HFA) 90 mcg/act inhaler Inhale 2 puffs every 6 (six) hours as needed for wheezing or shortness of breath (Patient not taking: Reported on 8/21/2020) 1 Inhaler 4     No current facility-administered medications for this visit  Allergies  No Known Allergies      Past Medical History, Social History, Family History, medications and allergies were reviewed        Vitals  Vitals:    08/21/20 1119   BP: 124/82   Pulse: 73 Temp: (!) 97 3 °F (36 3 °C)   Weight: 100 kg (221 lb)   Height: 5' 4" (1 626 m)     Physical Exam    /82   Pulse 73   Temp (!) 97 3 °F (36 3 °C)   Ht 5' 4" (1 626 m)   Wt 100 kg (221 lb)   BMI 37 93 kg/m²   General appearance: alert and oriented, in no acute distress  Head: Normocephalic, without obvious abnormality, atraumatic  Lungs: clear to auscultation bilaterally  Heart: regular rate and rhythm, S1, S2 normal, no murmur, click, rub or gallop  Abdomen: Soft, nontender  Extremities: Full range of motion no edema noted  Skin: Warm, dry and intact  Neurologic: Grossly normal      CT ABDOMEN AND PELVIS WITHOUT IV CONTRAST - LOW DOSE RENAL STONE      INDICATION:   Flank pain, kidney stone suspected  Left flank pain      COMPARISON:  1/24/2017      TECHNIQUE:  Low dose thin section CT examination of the abdomen and pelvis was performed without intravenous or oral contrast according to a protocol specifically designed to evaluate for urinary tract calculus  Axial, sagittal, and coronal 2D   reformatted images were created from the source data and submitted for interpretation  Evaluation for pathology in the abdomen and pelvis that is unrelated to urinary tract calculi is limited       Radiation dose length product (DLP) for this visit:  660 mGy-cm   This examination, like all CT scans performed in the [facility], was performed utilizing techniques to minimize radiation dose exposure, including the use of iterative   reconstruction and automated exposure control       FINDINGS:     RIGHT KIDNEY AND URETER:  Tiny 1 to 2 mm nonobstructing right renal calculus is noted  No hydronephrosis or hydroureter      LEFT KIDNEY AND URETER:  22 x 21 mm left renal pelvis calculus noted this may result in some left upper pole calyx dilatation  3 mm nonobstructing left lower pole renal calculus is noted    No hydronephrosis or hydroureter      URINARY BLADDER:   Unremarkable      Small to moderate hiatal hernia noted      Limited low radiation dose noncontrast CT evaluation demonstrates no clinically significant abnormality of liver, spleen, pancreas, or adrenal glands  The gallbladder is surgically absent  No ascites or bulky lymphadenopathy on this limited noncontrast study  Bowel loops appear unremarkable  Limited evaluation demonstrates no evidence to suggest acute appendicitis  No acute fracture or destructive osseous lesion is identified         IMPRESSION:     22 x 21 mm left renal pelvis calculus which may result in mild left upper pole calyx dilatation    No overt hydronephrosis is present      Tiny bilateral nonobstructing renal calculi

## 2020-08-21 NOTE — PROGRESS NOTES
Pre-op visit  8/21/2020      Chief Complaint   Patient presents with    Nephrolithiasis     Assessment and Plan     72 y o  female managed by new patient  1  Nephrolithiasis  · CT stone study performed 06/25/2020 reveals a 22 x 21 mm left renal pelvis calculi with no overt hydronephrosis noted  · Recommend PCNL  · OR case requested  · Urinalysis with microscopy and urine culture ordered  · Patient will need medical clearance prior to surgery    History and physical was performed for the patients upcoming PCNL scheduled with Dr Maria M Delong  All questions and concerns regarding surgery have been addressed and answered  Will proceed with surgery as planned  History of Present Illness  Keith Zaidi is a 72 y o  female here for history and physical prior to their upcoming PCNL  The patient has had no overall changes in their health since their last visit and denies any prior complications with anesthesia  Patient with a significant past medical history of grade 1 endometrial cancer and is status post TAHBSO, sent no pelvic node biopsy, cystoscopy with retrograde pyelogram with Dr Jimmy Tai 07/09/2020  On diagnostic imaging, patient was noted to have a 22 by 21 mm left renal pelvis calculi with no ever hydronephrosis noted  She reports a significant past medical history of nephrolithiasis with  instrumentation on the right side  She currently denies all lower urinary tract symptoms  She does complain of left flank pain and discomfort  Denies fever and chills  Most recent BMP performed 07/10/2020 reveals BUN of 12 and creatinine 1 05    Patient has complications secondary to anesthesia  She denies recent onset of chest pain, dizziness  She does report occasional shortness of breath with an underlying history of asthma which has been well controlled and she is using inhalers daily  Review of Systems   Constitutional: Negative for chills and fever     Respiratory: Positive for shortness of breath  Negative for cough  Cardiovascular: Negative for chest pain  Gastrointestinal: Negative for abdominal distention, abdominal pain, blood in stool, nausea and vomiting  Genitourinary: Positive for flank pain (Left)  Negative for difficulty urinating, dysuria, enuresis, frequency, hematuria and urgency  Musculoskeletal: Negative for back pain  Skin: Negative for rash  Neurological: Negative for dizziness, light-headedness and headaches  Past Medical History  Past Medical History:   Diagnosis Date    Anxiety     Asthma     Bipolar disorder ([place] [address] )     Cancer (HCC)     CPAP (continuous positive airway pressure) dependence     currently using    Depression     Diabetes mellitus ([place] [address] )     NIDDM    Endometrial cancer ([place] [address] )     3/20    GERD (gastroesophageal reflux disease)     Glaucoma     History of transfusion     2x with birth of both babies 1970and 1983, no reaction    Hyperlipidemia     Hypertension     Kidney stone     Obesity     PMB (postmenopausal bleeding)     Shortness of breath     Sleep apnea     Stroke ([place] [address] ) 2016    Thickened endometrium     Uterus cancer ([place] [address] )     Wears glasses        Past Social History  Past Surgical History:   Procedure Laterality Date    APPENDECTOMY      CHOLECYSTECTOMY      KIDNEY STONE SURGERY      LYMPH NODE DISSECTION N/A 7/9/2020    Procedure: BILATERAL PELVIC EXCISIONAL SENTINEL LYMPH NODE BIOPSIES;  Surgeon: Enoch Lay MD;  [facility];  Service: Gynecology Oncology    HI CYSTOURETHROSCOPY,URETER CATHETER Bilateral 7/9/2020    Procedure: CYSTOSCOPY WITH RETROGRADE PYELOGRAM;  Surgeon: Enoch Lay MD;  [facility];  Service: Gynecology Oncology    HI HYSTEROSCOPY,W/ENDO BX N/A 5/27/2020    Procedure: DILATATION AND CURETTAGE (D&C) WITH HYSTEROSCOPY;  Surgeon:  Esther Brady MD;  [facility];  Service: Gynecology    HI INJECTION FOR LYMPHATIC XRAY N/A 7/9/2020    Procedure: Mel Be WITH INDOCYANINE GREEN (ICG);   Surgeon: Sam Lomeli MD;  [facility];  Service: Gynecology Oncology    CA LAPAROSCOPY Paige Dayo <=250 GRAM  W TUBE/OVARY N/A 7/9/2020    Procedure: ROBOTIC TOTAL HYSTERECTOMY, BILATERAL SALPINGO-OOPHORECTOMY, PELVIC SENTINEL LYMPH NODE BIOPSIES I;  Surgeon: Sam Lomeli MD;  [facility];  Service: Gynecology Oncology    TUBAL LIGATION         Past Family History  Family History   Problem Relation Age of Onset    No Known Problems Mother     No Known Problems Maternal Grandmother     No Known Problems Paternal Grandmother     No Known Problems Maternal Aunt        Past Social history  Social History     Socioeconomic History    Marital status: Single     Spouse name: Not on file    Number of children: Not on file    Years of education: Not on file    Highest education level: Not on file   Occupational History    Not on file   Social Needs    Financial resource strain: Not on file    Food insecurity     Worry: Not on file     Inability: Not on file    Transportation needs     Medical: Not on file     Non-medical: Not on file   Tobacco Use    Smoking status: Never Smoker    Smokeless tobacco: Never Used   Substance and Sexual Activity    Alcohol use: Yes     Frequency: Monthly or less     Drinks per session: 1 or 2     Binge frequency: Never    Drug use: Never    Sexual activity: Yes     Partners: Male     Birth control/protection: Female Sterilization   Lifestyle    Physical activity     Days per week: Not on file     Minutes per session: Not on file    Stress: Not on file   Relationships    Social connections     Talks on phone: Not on file     Gets together: Not on file     Attends L[employer] service: Not on file     Active member of club or organization: Not on file     Attends meetings of clubs or organizations: Not on file     Relationship status: Not on file    Intimate partner violence     Fear of current or ex partner: Not on file     Emotionally abused: Not on file     Physically abused: Not on file     Forced sexual activity: Not on file   Other Topics Concern    Not on file   Social History Narrative    ** Merged History Encounter **            Current Medications  Current Outpatient Medications   Medication Sig Dispense Refill    atorvastatin (LIPITOR) 40 mg tablet Take 1 tablet (40 mg total) by mouth daily 30 tablet 4    Blood Pressure KIT by Does not apply route daily 1 each 0    busPIRone (BUSPAR) 15 mg tablet Take 15 mg by mouth 3 (three) times a day Pt unsure of frequency      Cholecalciferol (VITAMIN D3) 25 MCG (1000 UT) CAPS Take 1,000 Units by mouth daily       glucose blood (FREESTYLE TEST STRIPS) test strip Test 3xs daily 100 each 2    glucose monitoring kit (FREESTYLE) monitoring kit 1 each by Does not apply route 3 (three) times a day TEST 3XS DAILY 1 each 0    hydrochlorothiazide (HYDRODIURIL) 25 mg tablet Take 1 tablet (25 mg total) by mouth daily  4    Lancets (FREESTYLE) lancets Test 3xs daily 100 each 2    lisinopril (ZESTRIL) 20 mg tablet Take 1 tablet (20 mg total) by mouth daily 20 tablet 4    metFORMIN (GLUCOPHAGE) 500 mg tablet Take 1 tablet (500 mg total) by mouth 2 (two) times a day with meals 60 tablet 4    metoprolol tartrate (LOPRESSOR) 25 mg tablet Take 1 tablet (25 mg total) by mouth daily 90 tablet 4    omeprazole (PriLOSEC) 20 mg delayed release capsule Take 2 capsules (40 mg total) by mouth every evening 60 capsule 3    albuterol (PROVENTIL HFA,VENTOLIN HFA) 90 mcg/act inhaler Inhale 2 puffs every 6 (six) hours as needed for wheezing or shortness of breath (Patient not taking: Reported on 8/21/2020) 1 Inhaler 4     No current facility-administered medications for this visit  Allergies  No Known Allergies      Past Medical History, Social History, Family History, medications and allergies were reviewed        Vitals  Vitals:    08/21/20 1119   BP: 124/82   Pulse: 73 Temp: (!) 97 3 °F (36 3 °C)   Weight: 100 kg (221 lb)   Height: 5' 4" (1 626 m)     Physical Exam    /82   Pulse 73   Temp (!) 97 3 °F (36 3 °C)   Ht 5' 4" (1 626 m)   Wt 100 kg (221 lb)   BMI 37 93 kg/m²   General appearance: alert and oriented, in no acute distress  Head: Normocephalic, without obvious abnormality, atraumatic  Lungs: clear to auscultation bilaterally  Heart: regular rate and rhythm, S1, S2 normal, no murmur, click, rub or gallop  Abdomen: Soft, nontender  Extremities: Full range of motion no edema noted  Skin: Warm, dry and intact  Neurologic: Grossly normal      CT ABDOMEN AND PELVIS WITHOUT IV CONTRAST - LOW DOSE RENAL STONE      INDICATION:   Flank pain, kidney stone suspected  Left flank pain      COMPARISON:  1/24/2017      TECHNIQUE:  Low dose thin section CT examination of the abdomen and pelvis was performed without intravenous or oral contrast according to a protocol specifically designed to evaluate for urinary tract calculus  Axial, sagittal, and coronal 2D   reformatted images were created from the source data and submitted for interpretation  Evaluation for pathology in the abdomen and pelvis that is unrelated to urinary tract calculi is limited       Radiation dose length product (DLP) for this visit:  660 mGy-cm   This examination, like all CT scans performed in the [facility], was performed utilizing techniques to minimize radiation dose exposure, including the use of iterative   reconstruction and automated exposure control       FINDINGS:     RIGHT KIDNEY AND URETER:  Tiny 1 to 2 mm nonobstructing right renal calculus is noted  No hydronephrosis or hydroureter      LEFT KIDNEY AND URETER:  22 x 21 mm left renal pelvis calculus noted this may result in some left upper pole calyx dilatation  3 mm nonobstructing left lower pole renal calculus is noted    No hydronephrosis or hydroureter      URINARY BLADDER:   Unremarkable      Small to moderate hiatal hernia noted      Limited low radiation dose noncontrast CT evaluation demonstrates no clinically significant abnormality of liver, spleen, pancreas, or adrenal glands  The gallbladder is surgically absent  No ascites or bulky lymphadenopathy on this limited noncontrast study  Bowel loops appear unremarkable  Limited evaluation demonstrates no evidence to suggest acute appendicitis  No acute fracture or destructive osseous lesion is identified         IMPRESSION:     22 x 21 mm left renal pelvis calculus which may result in mild left upper pole calyx dilatation    No overt hydronephrosis is present      Tiny bilateral nonobstructing renal calculi       Lisette Yeison, CRNP

## 2020-08-23 LAB — BACTERIA UR CULT: NORMAL

## 2020-08-24 ENCOUNTER — TELEPHONE (OUTPATIENT)
Dept: UROLOGY | Facility: AMBULATORY SURGERY CENTER | Age: 65
End: 2020-08-24

## 2020-08-24 DIAGNOSIS — I10 ESSENTIAL HYPERTENSION: Chronic | ICD-10-CM

## 2020-08-24 NOTE — TELEPHONE ENCOUNTER
Christian New: Please let me know when this is scheduled so I can start her on abx 5 days before   Thank you

## 2020-08-24 NOTE — TELEPHONE ENCOUNTER
Infectious complications are high with PCNL  I would start her with the antibiotic 5 days prior to her procedure

## 2020-08-24 NOTE — TELEPHONE ENCOUNTER
Do you want me to cover her with 5 days of Bactrim? There is no isolated bacteria in urine so I am assuming this is probably a contaminated specimen  But since she is scheduled for PCNL, what are your thoughts about abx?

## 2020-08-25 ENCOUNTER — OFFICE VISIT (OUTPATIENT)
Dept: GYNECOLOGIC ONCOLOGY | Facility: CLINIC | Age: 65
End: 2020-08-25

## 2020-08-25 VITALS
BODY MASS INDEX: 36.54 KG/M2 | WEIGHT: 214 LBS | TEMPERATURE: 97.4 F | DIASTOLIC BLOOD PRESSURE: 82 MMHG | HEART RATE: 76 BPM | SYSTOLIC BLOOD PRESSURE: 130 MMHG | HEIGHT: 64 IN

## 2020-08-25 DIAGNOSIS — C54.1 ENDOMETRIAL CANCER, GRADE I (HCC): Primary | ICD-10-CM

## 2020-08-25 PROCEDURE — 3008F BODY MASS INDEX DOCD: CPT | Performed by: NURSE PRACTITIONER

## 2020-08-25 PROCEDURE — 3008F BODY MASS INDEX DOCD: CPT | Performed by: OBSTETRICS & GYNECOLOGY

## 2020-08-25 PROCEDURE — 3075F SYST BP GE 130 - 139MM HG: CPT | Performed by: OBSTETRICS & GYNECOLOGY

## 2020-08-25 PROCEDURE — 3008F BODY MASS INDEX DOCD: CPT | Performed by: FAMILY MEDICINE

## 2020-08-25 PROCEDURE — 3066F NEPHROPATHY DOC TX: CPT | Performed by: OBSTETRICS & GYNECOLOGY

## 2020-08-25 PROCEDURE — 3079F DIAST BP 80-89 MM HG: CPT | Performed by: OBSTETRICS & GYNECOLOGY

## 2020-08-25 PROCEDURE — 3060F POS MICROALBUMINURIA REV: CPT | Performed by: OBSTETRICS & GYNECOLOGY

## 2020-08-25 PROCEDURE — 99024 POSTOP FOLLOW-UP VISIT: CPT | Performed by: OBSTETRICS & GYNECOLOGY

## 2020-08-25 RX ORDER — HYDROCHLOROTHIAZIDE 25 MG/1
25 TABLET ORAL DAILY
Qty: 30 TABLET | Refills: 2 | Status: SHIPPED | OUTPATIENT
Start: 2020-08-25 | End: 2020-11-06

## 2020-08-25 NOTE — PROGRESS NOTES
Assessment/Plan:    Problem List Items Addressed This Visit        Genitourinary    Endometrial cancer, grade I ([facility] [address] ) - Primary     High intermediate risk endometrial cancer  Her vaginal cuff is well healed  She will see Radiation Oncology tomorrow for consideration of vaginal brachytherapy  I plan to see her back in 3 months for routine surveillance  I recommended to continue with pelvic rest until completion of vaginal brachytherapy  CHIEF COMPLAINT:  Vaginal cuff check     Problem:  Cancer Staging  Endometrial cancer, grade I ([address] )  Staging form: Corpus Uteri - Carcinoma, AJCC 8th Edition  - Pathologic stage from 7/9/2020: FIGO Stage IB (pT1b, pN0(sn), cM0) - Signed by Rene Myoa MD on 7/28/2020        Previous therapy:  Oncology History   Endometrial cancer, grade I ([facility] [address] )   5/27/2020 Initial Diagnosis    Endometrial cancer, grade I ([facility] [address] )     5/27/2020 Biopsy    Final Diagnosis    A  Endometrium, Endometrial currettage:  - Endometrial carcinoma, favor endometrioid type, FIGO grade 1       B  Endocervix, Endocervical currettage:  - Endocervical and squamous epithelium   - Lower uterine segment epithelium with tubal metaplasia  - No dysplasia or cancer is identified            7/9/2020 -  Cancer Staged    Staging form: Corpus Uteri - Carcinoma, AJCC 8th Edition  - Pathologic stage from 7/9/2020: FIGO Stage IB (pT1b, pN0(sn), cM0) - Signed by Rene Moya MD on 7/28/2020  Tumor size (mm): 55  Method of lymph node assessment: Rogers lymph node biopsy  Histologic grade (G): G1  Histologic grading system: 3 grade system  Lymph-vascular invasion (LVI): LVI not present (absent)/not identified  Residual tumor (R): R0 - None  Histopathologic type: Endometrioid adenocarcinoma, NOS  Diagnostic confirmation: Positive histology  Specimen type: Excision  Staged by: Managing physician  Peritoneal cytology results: Negative  Pelvic naeem status: Negative  Number of pelvic nodes positive from dissection: 0  Number of pelvic nodes examined during dissection: 8  Lymph node metastasis: Absent  Omentectomy performed: No  Morcellation performed: No  National guidelines used in treatment planning: Yes  Type of national guideline used in treatment planning: NCCN       7/9/2020 Surgery    Cystoscopy with retrograde bilateral pyelograms, robotic hysterectomy, bilateral salpingo-oophorectomy, adhesiolysis, sentinel lymph node biopsies  Final pathology demonstrated 5 5 cm grade 1 endometrial tumor with 84% myometrial invasion, low uterine segment involvement noted  No lymphovascular space invasion  Negative cervix  Negative pelvic washings  A sentinel pelvic lymph nodes negative for malignancy  Tumor demonstrated MLH1 and PMS2 loss  MLH1 methylqtion detected  Patient ID: Ata Hernandez is a 72 y o  female  HPI  Patient is here for her 6 week vaginal cuff check  She had surgery on July 9th for endometrial cancer along with cystoscopy and retrograde nephrostogram for kidney stone  Since surgery, she has recovered uneventfully  She has seen urology is scheduled for PCNL in the future  She is scheduled to see radiation oncology tomorrow for consideration of vaginal brachytherapy  Denies vaginal bleeding, drainage or discharge  No abdominal pelvic pain  Incisions well-healed  The following portions of the patient's history were reviewed and updated as appropriate: allergies, current medications, past family history, past medical history, past social history, past surgical history and problem list     Review of Systems  As above  Twelve point review of systems otherwise unremarkable      Current Outpatient Medications:     albuterol (PROVENTIL HFA,VENTOLIN HFA) 90 mcg/act inhaler, Inhale 2 puffs every 6 (six) hours as needed for wheezing or shortness of breath (Patient not taking: Reported on 8/21/2020), Disp: 1 Inhaler, Rfl: 4    atorvastatin (LIPITOR) 40 mg tablet, Take 1 tablet (40 mg total) by mouth daily, Disp: 30 tablet, Rfl: 4    Blood Pressure KIT, by Does not apply route daily, Disp: 1 each, Rfl: 0    busPIRone (BUSPAR) 15 mg tablet, Take 15 mg by mouth 3 (three) times a day Pt unsure of frequency, Disp: , Rfl:     Cholecalciferol (VITAMIN D3) 25 MCG (1000 UT) CAPS, Take 1,000 Units by mouth daily , Disp: , Rfl:     glucose blood (FREESTYLE TEST STRIPS) test strip, Test 3xs daily, Disp: 100 each, Rfl: 2    glucose monitoring kit (FREESTYLE) monitoring kit, 1 each by Does not apply route 3 (three) times a day TEST 3XS DAILY, Disp: 1 each, Rfl: 0    hydrochlorothiazide (HYDRODIURIL) 25 mg tablet, Take 1 tablet (25 mg total) by mouth daily, Disp: , Rfl: 4    Lancets (FREESTYLE) lancets, Test 3xs daily, Disp: 100 each, Rfl: 2    lisinopril (ZESTRIL) 20 mg tablet, Take 1 tablet (20 mg total) by mouth daily, Disp: 20 tablet, Rfl: 4    metFORMIN (GLUCOPHAGE) 500 mg tablet, Take 1 tablet (500 mg total) by mouth 2 (two) times a day with meals, Disp: 60 tablet, Rfl: 4    metoprolol tartrate (LOPRESSOR) 25 mg tablet, Take 1 tablet (25 mg total) by mouth daily, Disp: 90 tablet, Rfl: 4    omeprazole (PriLOSEC) 20 mg delayed release capsule, Take 2 capsules (40 mg total) by mouth every evening, Disp: 60 capsule, Rfl: 3    Objective:    Blood pressure 130/82, pulse 76, temperature (!) 97 4 °F (36 3 °C), temperature source Tympanic, height 5' 4" (1 626 m), weight 97 1 kg (214 lb), not currently breastfeeding  Body mass index is 36 73 kg/m²  Body surface area is 2 01 meters squared  Physical Exam  Abdomen:  Incisions well-healed  No hernias  Pelvic:  Normal external genitalia  Vulva vagina with no lesions  Vagina with no discharge, blood or granulation tissue  Vaginal cuff is well reapproximated without separation or dehiscence  Suture material is palpable      Meghan Leonard MD, [address]  8/25/2020  11:39 AM

## 2020-08-25 NOTE — ASSESSMENT & PLAN NOTE
High intermediate risk endometrial cancer  Her vaginal cuff is well healed  She will see Radiation Oncology tomorrow for consideration of vaginal brachytherapy  I plan to see her back in 3 months for routine surveillance  I recommended to continue with pelvic rest until completion of vaginal brachytherapy

## 2020-08-26 ENCOUNTER — APPOINTMENT (OUTPATIENT)
Dept: RADIATION ONCOLOGY | Facility: CLINIC | Age: 65
End: 2020-08-26
Payer: COMMERCIAL

## 2020-08-26 PROCEDURE — 77334 RADIATION TREATMENT AID(S): CPT | Performed by: RADIOLOGY

## 2020-08-26 PROCEDURE — 77290 THER RAD SIMULAJ FIELD CPLX: CPT | Performed by: RADIOLOGY

## 2020-08-26 PROCEDURE — 77470 SPECIAL RADIATION TREATMENT: CPT | Performed by: RADIOLOGY

## 2020-08-29 DIAGNOSIS — Z11.59 ENCOUNTER FOR SCREENING FOR OTHER VIRAL DISEASES: ICD-10-CM

## 2020-08-29 PROCEDURE — U0003 INFECTIOUS AGENT DETECTION BY NUCLEIC ACID (DNA OR RNA); SEVERE ACUTE RESPIRATORY SYNDROME CORONAVIRUS 2 (SARS-COV-2) (CORONAVIRUS DISEASE [COVID-19]), AMPLIFIED PROBE TECHNIQUE, MAKING USE OF HIGH THROUGHPUT TECHNOLOGIES AS DESCRIBED BY CMS-2020-01-R: HCPCS

## 2020-08-30 LAB — SARS-COV-2 RNA SPEC QL NAA+PROBE: NOT DETECTED

## 2020-08-31 PROCEDURE — 77370 RADIATION PHYSICS CONSULT: CPT | Performed by: RADIOLOGY

## 2020-08-31 PROCEDURE — 77295 3-D RADIOTHERAPY PLAN: CPT | Performed by: RADIOLOGY

## 2020-09-01 ENCOUNTER — APPOINTMENT (OUTPATIENT)
Dept: RADIATION ONCOLOGY | Facility: CLINIC | Age: 65
End: 2020-09-01
Attending: RADIOLOGY
Payer: COMMERCIAL

## 2020-09-04 ENCOUNTER — PREP FOR PROCEDURE (OUTPATIENT)
Dept: INTERVENTIONAL RADIOLOGY/VASCULAR | Facility: CLINIC | Age: 65
End: 2020-09-04

## 2020-09-04 ENCOUNTER — ANESTHESIA (OUTPATIENT)
Dept: GASTROENTEROLOGY | Facility: MEDICAL CENTER | Age: 65
End: 2020-09-04

## 2020-09-04 ENCOUNTER — HOSPITAL ENCOUNTER (OUTPATIENT)
Dept: GASTROENTEROLOGY | Facility: MEDICAL CENTER | Age: 65
Setting detail: OUTPATIENT SURGERY
Discharge: HOME | End: 2020-09-04
Attending: INTERNAL MEDICINE
Payer: COMMERCIAL

## 2020-09-04 VITALS
DIASTOLIC BLOOD PRESSURE: 71 MMHG | WEIGHT: 214 LBS | RESPIRATION RATE: 17 BRPM | TEMPERATURE: 98.4 F | OXYGEN SATURATION: 95 % | BODY MASS INDEX: 36.54 KG/M2 | SYSTOLIC BLOOD PRESSURE: 128 MMHG | HEIGHT: 64 IN | HEART RATE: 79 BPM

## 2020-09-04 VITALS — HEART RATE: 79 BPM

## 2020-09-04 DIAGNOSIS — K21.9 GASTROESOPHAGEAL REFLUX DISEASE WITHOUT ESOPHAGITIS: ICD-10-CM

## 2020-09-04 DIAGNOSIS — N20.0 KIDNEY STONE: Primary | ICD-10-CM

## 2020-09-04 DIAGNOSIS — R13.19 ESOPHAGEAL DYSPHAGIA: ICD-10-CM

## 2020-09-04 DIAGNOSIS — N20.0 NEPHROLITHIASIS: Primary | ICD-10-CM

## 2020-09-04 DIAGNOSIS — Z12.11 COLON CANCER SCREENING: ICD-10-CM

## 2020-09-04 PROCEDURE — 88305 TISSUE EXAM BY PATHOLOGIST: CPT | Performed by: PATHOLOGY

## 2020-09-04 PROCEDURE — NC001 PR NO CHARGE: Performed by: INTERNAL MEDICINE

## 2020-09-04 PROCEDURE — 43239 EGD BIOPSY SINGLE/MULTIPLE: CPT | Performed by: INTERNAL MEDICINE

## 2020-09-04 PROCEDURE — 45380 COLONOSCOPY AND BIOPSY: CPT | Performed by: INTERNAL MEDICINE

## 2020-09-04 RX ORDER — SODIUM CHLORIDE 9 MG/ML
125 INJECTION, SOLUTION INTRAVENOUS CONTINUOUS
Status: CANCELLED | OUTPATIENT
Start: 2020-09-04

## 2020-09-04 RX ORDER — PROPOFOL 10 MG/ML
INJECTION, EMULSION INTRAVENOUS AS NEEDED
Status: DISCONTINUED | OUTPATIENT
Start: 2020-09-04 | End: 2020-09-04

## 2020-09-04 RX ORDER — PROPOFOL 10 MG/ML
INJECTION, EMULSION INTRAVENOUS CONTINUOUS PRN
Status: DISCONTINUED | OUTPATIENT
Start: 2020-09-04 | End: 2020-09-04

## 2020-09-04 RX ORDER — LIDOCAINE HYDROCHLORIDE 20 MG/ML
INJECTION, SOLUTION EPIDURAL; INFILTRATION; INTRACAUDAL; PERINEURAL AS NEEDED
Status: DISCONTINUED | OUTPATIENT
Start: 2020-09-04 | End: 2020-09-04

## 2020-09-04 RX ORDER — SODIUM CHLORIDE 9 MG/ML
125 INJECTION, SOLUTION INTRAVENOUS CONTINUOUS
Status: DISCONTINUED | OUTPATIENT
Start: 2020-09-04 | End: 2020-09-08 | Stop reason: HOSPADM

## 2020-09-04 RX ADMIN — PROPOFOL 150 MG: 10 INJECTION, EMULSION INTRAVENOUS at 09:28

## 2020-09-04 RX ADMIN — PROPOFOL 140 MCG/KG/MIN: 10 INJECTION, EMULSION INTRAVENOUS at 09:28

## 2020-09-04 RX ADMIN — LIDOCAINE HYDROCHLORIDE 5 ML: 20 INJECTION, SOLUTION EPIDURAL; INFILTRATION; INTRACAUDAL at 09:28

## 2020-09-04 RX ADMIN — SODIUM CHLORIDE 125 ML/HR: 0.9 INJECTION, SOLUTION INTRAVENOUS at 09:22

## 2020-09-04 NOTE — H&P
History and Physical - [facility]  Alex Tinsley 72 y o  female MRN: [id number]                  HPI: Alex Tinsley is a 72y o  year old female who presents for dysphagia and colon cancer screening      REVIEW OF SYSTEMS: Per the HPI, and otherwise unremarkable  Historical Information   Past Medical History:   Diagnosis Date    Anxiety     Asthma     [facility]     Cancer (HCC)     CPAP (continuous positive airway pressure) dependence     currently using    Depression     Diabetes mellitus ([facility] )     NIDDM    Endometrial cancer ([facility] )     3/20    GERD (gastroesophageal reflux disease)     Glaucoma     History of transfusion     2x with birth of both babies 1970and 1983, no reaction    Hyperlipidemia     Hypertension     Kidney stone     Obesity     PMB (postmenopausal bleeding)     Shortness of breath     Sleep apnea     [facility] ) 2016    Thickened endometrium     Uterus cancer ([place] [address] )     Wears glasses      Past Surgical History:   Procedure Laterality Date    APPENDECTOMY      CHOLECYSTECTOMY      KIDNEY STONE SURGERY      LYMPH NODE DISSECTION N/A 7/9/2020    Procedure: BILATERAL PELVIC EXCISIONAL SENTINEL LYMPH NODE BIOPSIES;  Surgeon: Patrick Ahmadi MD;  [facility];  Service: Gynecology Oncology    FL CYSTOURETHROSCOPY,URETER CATHETER Bilateral 7/9/2020    Procedure: CYSTOSCOPY WITH RETROGRADE PYELOGRAM;  Surgeon: Patrick Ahmadi MD;  [facility];  Service: Gynecology Oncology    FL HYSTEROSCOPY,W/ENDO BX N/A 5/27/2020    Procedure: DILATATION AND CURETTAGE (D&C) WITH HYSTEROSCOPY;  Surgeon: Nory Ramirez MD;  [facility];  Service: Gynecology    FL INJECTION FOR LYMPHATIC XRAY N/A 7/9/2020    Procedure: LYMPHOGRAPHY WITH INDOCYANINE GREEN (ICG);   Surgeon: Patrick Ahmadi MD;  [facility];  Service: Gynecology Oncology    FL LAPAROSCOPY W TOT HYSTERECTUTERUS <=250 Allyne Just TUBE/OVARY N/A 7/9/2020    Procedure: ROBOTIC TOTAL HYSTERECTOMY, BILATERAL SALPINGO-OOPHORECTOMY, PELVIC SENTINEL LYMPH NODE BIOPSIES I;  Surgeon: Patrick Ahmadi MD;  [facility];  Service: Gynecology Oncology    TUBAL LIGATION       Social History   Social History     Substance and Sexual Activity   Alcohol Use Yes    Frequency: Monthly or less    Drinks per session: 1 or 2    Binge frequency: Never     Social History     Substance and Sexual Activity   Drug Use Never     Social History     Tobacco Use   Smoking Status Never Smoker   Smokeless Tobacco Never Used     Family History   Problem Relation Age of Onset    No Known Problems Mother     No Known Problems Maternal Grandmother     No Known Problems Paternal Grandmother     No Known Problems Maternal Aunt        Meds/Allergies     (Not in a hospital admission)      No Known Allergies    Objective     /88   Pulse 79   Temp 98 4 °F (36 9 °C) (Temporal)   Resp 16   Ht 5' 4" (1 626 m)   Wt 97 1 kg (214 lb)   SpO2 98%   BMI 36 73 kg/m²       PHYSICAL EXAM    Gen: NAD  CV: RRR  CHEST: Clear  ABD: soft, NT/ND  EXT: no edema      ASSESSMENT/PLAN:  This is a 72y o  year old female here for EGD and colonoscopy, and she is stable and optimized for her procedure

## 2020-09-04 NOTE — TELEPHONE ENCOUNTER
Tanisha Gomes, can you please enter a referral to IR in [facility] for her tube placement? Thank you

## 2020-09-04 NOTE — DISCHARGE INSTRUCTIONS
Endoscopia superior   LO QUE NECESITA SABER:   Tasha endoscopia superior también se conoce gavi endoscopia gastrointestinal (GI) superior o esofagogastroduodenoscopia (EGD)  Usted podría sentirse inflamado, con gases o sentir molestia abdominal después de vallecillo procedimiento  Vallecillo garganta podría estar adolorida por 24 a 36 horas  Usted podría eructar o expulsar gas debido al aire que todavía está en vallecillo cuerpo  INSTRUCCIONES SOBRE EL SANDOR HOSPITALARIA:   Llame al 911 en luisa de presentar lo siguiente:   · Tiene dolor en el pecho o dificultad para respirar de forma repentina  Busque atención médica de inmediato si:   · Está mareado o siente que se va a desmayar  · Usted tiene dificultad para tragar  · Annelise evacuaciones intestinales son Elveria [place] o negras  · Vallecillo abdomen está lambert y firme y usted siente dolor intenso  · Usted vomita dominguez  Pregúntele a vallecillo [place] vitaminas y minerales son adecuados para usted  · Usted se siente lleno o hinchado y no puede eructar o expulsar gas  · Usted no ha tenido tasha evacuación intestinal después de 3 días de vallecillo procedimiento  · Usted tiene dolor de radha  · Usted tiene fiebre o escalofríos  · Usted tiene náuseas o está vomitando  · Usted tiene salpullido o urticaria  · Usted tiene preguntas o inquietudes acerca de vallecillo endoscopia  Alivie el dolor de garganta:  Chupe pastillas para la garganta o hielo triturado  Juan gárgaras con tasha pequeña cantidad de agua tibia con sal  Mezcle 1 cucharadita de sal y 1 taza de agua tibia para hacer agua salada  Alivie el gas y la molestia de la inflamación:  Acuéstese sobre vallecillo costado derecho con tasha almohada térmica sobre vallecillo abdomen  Camine un poco para ayudar a expulsar el gas  Coma comidas pequeñas hasta que se alivie de la inflamación  Descanse después de vallecillo procedimiento:  A usted le lake administrado medicamento para relajarse   No  maneje o tome decisiones importantes hasta el día siguiente de vallecillo procedimiento  Regrese a fiona actividades normales según le indiquen  Usted generalmente puede regresar al Christensen Prakash al día siguiente de hemphill procedimiento  Acuda a fiona consultas de control con hemphill médico según le indicaron  Anote fiona preguntas para que se acuerde de hacerlas sumit fiona visitas  © 2017 johnie Barr  Information is for End User's use only and may not be sold, redistributed or otherwise used for commercial purposes  All illustrations and images included in CareNotes® are the copyrighted property of [employer]  or Brad Anne  Esta información es sólo para uso en educación  Hemphill intención no es darle un consejo médico sobre enfermedades o tratamientos  Colsulte con hemphill Robert Lauth farmacéutico antes de seguir cualquier régimen médico para saber si es seguro y efectivo para usted  Colonoscopia   LO QUE NECESITA SABER:   Lawrence colonoscopia es un procedimiento para examinar con un endoscopio el interior de hemphill colon (intestino)  Sumit lawrence colonoscopia, es posible que le retiren pólipos o crecimientos de tejidos  Es normal que se sienta inflamado o que tenga molestia abdominal  Usted debería estar expulsando los gases  Si tiene hemorroides o si le removieron pólipos, usted podría presentar lawrence pequeña cantidad de sangrado  INSTRUCCIONES SOBRE EL SANDOR HOSPITALARIA:   Busque atención médica de inmediato si:   · Usted presenta lawrence cantidad bib de dominguez madison brillante en fiona evacuaciones intestinales  · Hemphill abdomen está lambert y firme y usted siente dolor intenso  · Usted tiene dificultad repentina para respirar  Pregúntele a hemphill Martha Alvarez vitaminas y minerales son adecuados para usted  · Usted presenta sarpullido o urticaria  · Usted tiene fiebre dentro de las 24 horas después de hempihll procedimiento  · Usted no ha tenido lawrence evacuación intestinal después de 3 días de hemphill procedimiento      · Usted tiene preguntas o inquietudes acerca de hemphill Linda Sandoval  Actividad:   · No levante nada, no se esfuerce o corra  por 3 días después de hemphill procedimiento  · Descanse después de hemphill procedimiento  A usted le lake administrado medicamento para relajarse  No  maneje o tome decisiones importantes hasta el día siguiente de hemphill procedimiento  Regrese a fiona actividades normales según le indiquen  · Alivie los gases y la incomodidad de la inflamación  acostándose en hemphill costado derecho con lawrence almohada térmica sobre hemphill abdomen  Es posible que necesite caminar un poco para ayudar a eliminar los gases  Coma comidas pequeñas hasta que se alivie de la inflamación  Si a usted le removieron pólipos:  Por 7 días después de hemphill procedimiento:  · No  tome aspirina  · No  realice paseos largos en elgin  Ayude a prevenir el estreñimiento:   · Consuma alimentos saludables y variados  Los alimentos saludables incluyen fruta, vegetales, panes integrales, productos lácteos bajo en grasa, frijoles, carlyn sin grasa, y pescado  Pregunte si necesita seguir lawrence dieta especial  Hemphill médico puede recomendarle que coma alimentos ricos en fibra, gavi frijoles cocidos  La fibra lo ayuda a tener evacuaciones intestinales regulares  · [address] haya indicado  Los adultos deberían de beber entre 9 a 13 vasos de 8 onzas de líquidos cada día  Pregunte cuál es la cantidad [place] para usted  Para [place], los mejores líquidos son Reynaldo Stallion, y [place]  · Ejercítese según indicaciones  Consulte con hemphill médico acerca de cuál es el mejor régimen de ejercicio para usted  El ejercicio puede ayudar a prevenir estreñimiento, reducir hemphill presión arterial y [employer]  Acuda a fiona consultas de control con hemphill médico según le indicaron  Anote fiona preguntas para que se acuerde de hacerlas sumit fiona visitas     © 2017 [address] Information is for End User's use only and may not be sold, redistributed or otherwise used for commercial purposes  All illustrations and images included in CareNotes® are the copyrighted property of A D A M , Inc  or Brad Anne  Esta información es sólo para uso en educación  Vallecillo intención no es darle un consejo médico sobre enfermedades o tratamientos  Colsulte con vallecillo Jonel Clancy farmacéutico antes de seguir cualquier régimen médico para saber si es seguro y efectivo para usted

## 2020-09-04 NOTE — ANESTHESIA PREPROCEDURE EVALUATION
Review of Systems/Medical History  Patient summary reviewed  Chart reviewed  Cardiovascular  Hyperlipidemia, Hypertension controlled,    Pulmonary  Asthma , PRN med  controlled , Shortness of breath, Sleep apnea ,        GI/Hepatic    GERD well controlled, Bowel prep       Negative  ROS Kidney stones,        Endo/Other  Diabetes well controlled type 2 Oral agent,      GYN  Negative gynecology ROS          Hematology  Negative hematology ROS      Musculoskeletal  Negative musculoskeletal ROS        Neurology    CVA ,    Psychology   Anxiety, Depression ,              Physical Exam    Airway    Mallampati score: III  TM Distance: >3 FB  Neck ROM: full     Dental       Cardiovascular  Rhythm: regular, Rate: normal, Cardiovascular exam normal    Pulmonary  Pulmonary exam normal Breath sounds clear to auscultation,     Other Findings        Anesthesia Plan  ASA Score- 3     Anesthesia Type- IV sedation with anesthesia with ASA Monitors  Additional Monitors:   Airway Plan:           Plan Factors-    Chart reviewed  Patient summary reviewed  Induction- intravenous  Postoperative Plan-     Informed Consent- Anesthetic plan and risks discussed with patient

## 2020-09-04 NOTE — ANESTHESIA POSTPROCEDURE EVALUATION
Post-Op Assessment Note    CV Status:  Stable    Pain management: adequate     Mental Status:  Alert and awake   Hydration Status:  Euvolemic   PONV Controlled:  Controlled   Airway Patency:  Patent      Post Op Vitals Reviewed: Yes      Staff: Anesthesiologist         No complications documented      /77 (09/04/20 1009)    Temp      Pulse 87 (09/04/20 1009)   Resp 18 (09/04/20 1009)    SpO2 95 % (09/04/20 1009)

## 2020-09-04 NOTE — PERIOPERATIVE NURSING NOTE
Patient states she had chest pain and shortness of breath 2 weeks ago but was anxious at the time and did not contact family doctor  Dr Atul Martinez aware

## 2020-09-09 ENCOUNTER — RADIATION THERAPY TREATMENT (OUTPATIENT)
Dept: RADIATION ONCOLOGY | Facility: CLINIC | Age: 65
End: 2020-09-09
Attending: RADIOLOGY
Payer: COMMERCIAL

## 2020-09-09 ENCOUNTER — TELEPHONE (OUTPATIENT)
Dept: UROLOGY | Facility: AMBULATORY SURGERY CENTER | Age: 65
End: 2020-09-09

## 2020-09-09 PROCEDURE — C1717 BRACHYTX, NON-STR,HDR IR-192: HCPCS | Performed by: RADIOLOGY

## 2020-09-09 PROCEDURE — 77280 THER RAD SIMULAJ FIELD SMPL: CPT | Performed by: RADIOLOGY

## 2020-09-09 PROCEDURE — 77770 HDR RDNCL NTRSTL/ICAV BRCHTX: CPT | Performed by: RADIOLOGY

## 2020-09-09 PROCEDURE — 57156 INS VAG BRACHYTX DEVICE: CPT | Performed by: RADIOLOGY

## 2020-09-09 PROCEDURE — 77336 RADIATION PHYSICS CONSULT: CPT | Performed by: RADIOLOGY

## 2020-09-09 NOTE — TELEPHONE ENCOUNTER
I left a message for Tameka Valerio to call me to confirm/discuss her surgery with Dr Zain Soto on 10/14/20

## 2020-09-09 NOTE — TELEPHONE ENCOUNTER
I left a message for patient's PCP, [facility] [address], [phone], to call me to schedule a medical clearance appt

## 2020-09-10 ENCOUNTER — TELEPHONE (OUTPATIENT)
Dept: UROLOGY | Facility: AMBULATORY SURGERY CENTER | Age: 65
End: 2020-09-10

## 2020-09-10 DIAGNOSIS — E11.9 TYPE 2 DIABETES MELLITUS WITHOUT COMPLICATION, WITHOUT LONG-TERM CURRENT USE OF INSULIN (HCC): ICD-10-CM

## 2020-09-10 RX ORDER — LANCETS 28 GAUGE
EACH MISCELLANEOUS
Qty: 100 EACH | Refills: 2 | Status: SHIPPED | OUTPATIENT
Start: 2020-09-10 | End: 2020-09-24

## 2020-09-10 NOTE — TELEPHONE ENCOUNTER
I spoke with [facility] and confirmed her surgery date 10/14/20  She will have her CBC, BMP, type/screen and ucx done 9/21/20 and her Covid19 testing done 10/5/20 (per Alyssa Duarte @ [facility])  Her tube placement with IR is 10/12/20  Medical clearance is on 9/11/20 at [facility]  Packet with lab orders mailed to [facility]

## 2020-09-10 NOTE — TELEPHONE ENCOUNTER
I spoke with Merissa Lora @ [address] [facility], [phone], no Carvel Shorts is needed for outpatient [id number], 9/10/20, 2:56PM

## 2020-09-14 ENCOUNTER — APPOINTMENT (OUTPATIENT)
Dept: RADIATION ONCOLOGY | Facility: CLINIC | Age: 65
End: 2020-09-14
Payer: COMMERCIAL

## 2020-09-15 ENCOUNTER — APPOINTMENT (OUTPATIENT)
Dept: RADIATION ONCOLOGY | Facility: CLINIC | Age: 65
End: 2020-09-15
Attending: RADIOLOGY
Payer: COMMERCIAL

## 2020-09-15 PROCEDURE — 77280 THER RAD SIMULAJ FIELD SMPL: CPT | Performed by: RADIOLOGY

## 2020-09-15 PROCEDURE — 57156 INS VAG BRACHYTX DEVICE: CPT | Performed by: RADIOLOGY

## 2020-09-15 PROCEDURE — C1717 BRACHYTX, NON-STR,HDR IR-192: HCPCS | Performed by: RADIOLOGY

## 2020-09-15 PROCEDURE — 77770 HDR RDNCL NTRSTL/ICAV BRCHTX: CPT | Performed by: RADIOLOGY

## 2020-09-16 ENCOUNTER — APPOINTMENT (OUTPATIENT)
Dept: RADIATION ONCOLOGY | Facility: CLINIC | Age: 65
End: 2020-09-16
Payer: COMMERCIAL

## 2020-09-17 ENCOUNTER — APPOINTMENT (OUTPATIENT)
Dept: RADIATION ONCOLOGY | Facility: CLINIC | Age: 65
End: 2020-09-17
Payer: COMMERCIAL

## 2020-09-18 ENCOUNTER — APPOINTMENT (OUTPATIENT)
Dept: RADIATION ONCOLOGY | Facility: CLINIC | Age: 65
End: 2020-09-18
Attending: RADIOLOGY
Payer: COMMERCIAL

## 2020-09-18 DIAGNOSIS — Z11.59 SCREENING FOR VIRAL DISEASE: ICD-10-CM

## 2020-09-18 PROCEDURE — 77280 THER RAD SIMULAJ FIELD SMPL: CPT | Performed by: RADIOLOGY

## 2020-09-18 PROCEDURE — C1717 BRACHYTX, NON-STR,HDR IR-192: HCPCS | Performed by: RADIOLOGY

## 2020-09-18 PROCEDURE — U0003 INFECTIOUS AGENT DETECTION BY NUCLEIC ACID (DNA OR RNA); SEVERE ACUTE RESPIRATORY SYNDROME CORONAVIRUS 2 (SARS-COV-2) (CORONAVIRUS DISEASE [COVID-19]), AMPLIFIED PROBE TECHNIQUE, MAKING USE OF HIGH THROUGHPUT TECHNOLOGIES AS DESCRIBED BY CMS-2020-01-R: HCPCS | Performed by: UROLOGY

## 2020-09-18 PROCEDURE — 77770 HDR RDNCL NTRSTL/ICAV BRCHTX: CPT | Performed by: RADIOLOGY

## 2020-09-18 PROCEDURE — 57156 INS VAG BRACHYTX DEVICE: CPT | Performed by: RADIOLOGY

## 2020-09-19 LAB — SARS-COV-2 RNA SPEC QL NAA+PROBE: NOT DETECTED

## 2020-09-21 ENCOUNTER — APPOINTMENT (OUTPATIENT)
Dept: LAB | Facility: HOSPITAL | Age: 65
End: 2020-09-21
Payer: COMMERCIAL

## 2020-09-21 ENCOUNTER — APPOINTMENT (OUTPATIENT)
Dept: RADIATION ONCOLOGY | Facility: CLINIC | Age: 65
End: 2020-09-21
Payer: COMMERCIAL

## 2020-09-21 DIAGNOSIS — E11.9 TYPE 2 DIABETES MELLITUS WITHOUT COMPLICATION, WITHOUT LONG-TERM CURRENT USE OF INSULIN (HCC): ICD-10-CM

## 2020-09-21 DIAGNOSIS — N20.0 KIDNEY STONE: ICD-10-CM

## 2020-09-21 LAB
ABO GROUP BLD: NORMAL
ANION GAP SERPL CALCULATED.3IONS-SCNC: 7 MMOL/L (ref 5–14)
BASOPHILS # BLD AUTO: 0.1 THOUSANDS/ΜL (ref 0–0.1)
BASOPHILS NFR BLD AUTO: 1 % (ref 0–1)
BLD GP AB SCN SERPL QL: NEGATIVE
BUN SERPL-MCNC: 18 MG/DL (ref 5–25)
CALCIUM SERPL-MCNC: 10.4 MG/DL (ref 8.4–10.2)
CHLORIDE SERPL-SCNC: 104 MMOL/L (ref 97–108)
CO2 SERPL-SCNC: 29 MMOL/L (ref 22–30)
CREAT SERPL-MCNC: 0.89 MG/DL (ref 0.6–1.2)
EOSINOPHIL # BLD AUTO: 0.1 THOUSAND/ΜL (ref 0–0.4)
EOSINOPHIL NFR BLD AUTO: 2 % (ref 0–6)
ERYTHROCYTE [DISTWIDTH] IN BLOOD BY AUTOMATED COUNT: 14.6 %
GFR SERPL CREATININE-BSD FRML MDRD: 68 ML/MIN/1.73SQ M
GLUCOSE P FAST SERPL-MCNC: 120 MG/DL (ref 70–99)
HCT VFR BLD AUTO: 39.4 % (ref 36–46)
HGB BLD-MCNC: 12.8 G/DL (ref 12–16)
LYMPHOCYTES # BLD AUTO: 2.3 THOUSANDS/ΜL (ref 0.5–4)
LYMPHOCYTES NFR BLD AUTO: 40 % (ref 25–45)
MCH RBC QN AUTO: 26.5 PG (ref 26–34)
MCHC RBC AUTO-ENTMCNC: 32.4 G/DL (ref 31–36)
MCV RBC AUTO: 82 FL (ref 80–100)
MONOCYTES # BLD AUTO: 0.4 THOUSAND/ΜL (ref 0.2–0.9)
MONOCYTES NFR BLD AUTO: 8 % (ref 1–10)
NEUTROPHILS # BLD AUTO: 2.8 THOUSANDS/ΜL (ref 1.8–7.8)
NEUTS SEG NFR BLD AUTO: 50 % (ref 45–65)
PLATELET # BLD AUTO: 280 THOUSANDS/UL (ref 150–450)
PMV BLD AUTO: 10.1 FL (ref 8.9–12.7)
POTASSIUM SERPL-SCNC: 4 MMOL/L (ref 3.6–5)
RBC # BLD AUTO: 4.83 MILLION/UL (ref 4–5.2)
RH BLD: POSITIVE
SODIUM SERPL-SCNC: 140 MMOL/L (ref 137–147)
SPECIMEN EXPIRATION DATE: NORMAL
WBC # BLD AUTO: 5.7 THOUSAND/UL (ref 4.5–11)

## 2020-09-21 PROCEDURE — 77280 THER RAD SIMULAJ FIELD SMPL: CPT | Performed by: RADIOLOGY

## 2020-09-21 PROCEDURE — 77770 HDR RDNCL NTRSTL/ICAV BRCHTX: CPT | Performed by: RADIOLOGY

## 2020-09-21 PROCEDURE — 57156 INS VAG BRACHYTX DEVICE: CPT | Performed by: RADIOLOGY

## 2020-09-21 PROCEDURE — 86900 BLOOD TYPING SEROLOGIC ABO: CPT

## 2020-09-21 PROCEDURE — 86901 BLOOD TYPING SEROLOGIC RH(D): CPT

## 2020-09-21 PROCEDURE — 85025 COMPLETE CBC W/AUTO DIFF WBC: CPT

## 2020-09-21 PROCEDURE — 86850 RBC ANTIBODY SCREEN: CPT

## 2020-09-21 PROCEDURE — C1717 BRACHYTX, NON-STR,HDR IR-192: HCPCS | Performed by: RADIOLOGY

## 2020-09-21 PROCEDURE — 87086 URINE CULTURE/COLONY COUNT: CPT

## 2020-09-21 PROCEDURE — 80048 BASIC METABOLIC PNL TOTAL CA: CPT

## 2020-09-21 PROCEDURE — 36415 COLL VENOUS BLD VENIPUNCTURE: CPT

## 2020-09-22 LAB — BACTERIA UR CULT: NORMAL

## 2020-09-23 ENCOUNTER — APPOINTMENT (OUTPATIENT)
Dept: RADIATION ONCOLOGY | Facility: CLINIC | Age: 65
End: 2020-09-23
Payer: COMMERCIAL

## 2020-09-23 DIAGNOSIS — E11.9 TYPE 2 DIABETES MELLITUS WITHOUT COMPLICATION, WITHOUT LONG-TERM CURRENT USE OF INSULIN (HCC): ICD-10-CM

## 2020-09-24 ENCOUNTER — RADIATION THERAPY TREATMENT (OUTPATIENT)
Dept: RADIATION ONCOLOGY | Facility: CLINIC | Age: 65
End: 2020-09-24
Payer: COMMERCIAL

## 2020-09-24 PROCEDURE — 57156 INS VAG BRACHYTX DEVICE: CPT | Performed by: RADIOLOGY

## 2020-09-24 PROCEDURE — C1717 BRACHYTX, NON-STR,HDR IR-192: HCPCS | Performed by: RADIOLOGY

## 2020-09-24 PROCEDURE — 77770 HDR RDNCL NTRSTL/ICAV BRCHTX: CPT | Performed by: RADIOLOGY

## 2020-09-24 PROCEDURE — 77280 THER RAD SIMULAJ FIELD SMPL: CPT | Performed by: RADIOLOGY

## 2020-09-24 RX ORDER — LANCETS 28 GAUGE
EACH MISCELLANEOUS
Qty: 100 EACH | Refills: 1 | Status: SHIPPED | OUTPATIENT
Start: 2020-09-24 | End: 2020-12-02

## 2020-09-27 PROBLEM — Z01.818 PREOP GENERAL PHYSICAL EXAM: Status: ACTIVE | Noted: 2020-09-27

## 2020-09-28 ENCOUNTER — CONSULT (OUTPATIENT)
Dept: FAMILY MEDICINE CLINIC | Facility: CLINIC | Age: 65
End: 2020-09-28

## 2020-09-28 VITALS
HEART RATE: 97 BPM | WEIGHT: 214 LBS | SYSTOLIC BLOOD PRESSURE: 132 MMHG | HEIGHT: 64 IN | OXYGEN SATURATION: 98 % | RESPIRATION RATE: 18 BRPM | BODY MASS INDEX: 36.54 KG/M2 | DIASTOLIC BLOOD PRESSURE: 86 MMHG | TEMPERATURE: 97.6 F

## 2020-09-28 DIAGNOSIS — M25.562 CHRONIC PAIN OF LEFT KNEE: ICD-10-CM

## 2020-09-28 DIAGNOSIS — Z01.818 PREOP GENERAL PHYSICAL EXAM: Primary | ICD-10-CM

## 2020-09-28 DIAGNOSIS — G89.29 CHRONIC PAIN OF LEFT KNEE: ICD-10-CM

## 2020-09-28 PROBLEM — N17.9 ACUTE KIDNEY INJURY (HCC): Status: RESOLVED | Noted: 2020-07-09 | Resolved: 2020-09-28

## 2020-09-28 PROCEDURE — 99214 OFFICE O/P EST MOD 30 MIN: CPT | Performed by: FAMILY MEDICINE

## 2020-09-28 PROCEDURE — 3008F BODY MASS INDEX DOCD: CPT | Performed by: FAMILY MEDICINE

## 2020-09-28 PROCEDURE — 1036F TOBACCO NON-USER: CPT | Performed by: FAMILY MEDICINE

## 2020-09-28 RX ORDER — SENNOSIDES 8.6 MG
650 CAPSULE ORAL EVERY 8 HOURS PRN
Qty: 90 TABLET | Refills: 2 | Status: SHIPPED | OUTPATIENT
Start: 2020-09-28 | End: 2020-12-28

## 2020-09-28 NOTE — PATIENT INSTRUCTIONS
INSTRUCTIONS FOR YOUR UPCOMING OPERATION:    On October 13, 2020 --> TAKE your HCTZ but DO NOT TAKE your Lisinopril  On October 14, 2020 --> DO NOT TAKE ANY antihypertensive medications (DO NOT TAKE HCTZ OR Lisinopril)    DO NOT USE DICLOFENAC Gel until October 21, 2020

## 2020-09-28 NOTE — PROGRESS NOTES
Presurgical Evaluation    Subjective:      Patient ID: Mayelin Mcconnell is a 72 y o  female  Chief Complaint   Patient presents with    Pre-op Exam       Patient presents today for pre-surgical evaluation to do PCNL left side  Has no complaints outside of left knee pain that has been present for 4 months with no changes,  9 / 10 in severity, takes Tylenol for pain relief however notes that this is not very helpful  Cannot recall any trauma to the knee  The following portions of the patient's history were reviewed and updated as appropriate: allergies, current medications, past family history, past medical history, past social history, past surgical history and problem list     Procedure date: October 14, 2020    Surgeon:  Dr Sky Matias  Planned procedure:  L Percutaneous Nephrlithotomy  Diagnosis for procedure:  Nephrolothiasis    Prior anesthesia: Yes   General; Complications:  None / Tolerated well    CAD History: None   NOTE: Patient should see Cardiology if time available before surgery, and if appropriate  Pulmonary History: Asthma medicated on PRN albuterol used twice weekly    Renal history: CKD stage 2 - GFR 60-89    Diabetes History:  Type 1  Controlled     Neurological History: CVA 2015    On Immunosuppressant meds/biologics: No      Review of Systems   Constitutional: Negative for fever  HENT: Negative for rhinorrhea, sinus pain and sore throat  Eyes: Negative for visual disturbance (uses glasses however same broken now)  Respiratory: Positive for shortness of breath (improved with medications)  Negative for cough and wheezing  Cardiovascular: Negative for chest pain, palpitations and leg swelling  Gastrointestinal: Negative for abdominal pain, constipation, diarrhea and vomiting  Genitourinary: Positive for dysuria (sometimes)  Negative for frequency, hematuria, urgency and vaginal discharge  Musculoskeletal: Negative for myalgias     Neurological: Negative for headaches  Psychiatric/Behavioral: Negative for dysphoric mood  Current Outpatient Medications   Medication Sig Dispense Refill    acetaminophen (TYLENOL) 650 mg CR tablet Take 1 tablet (650 mg total) by mouth every 8 (eight) hours as needed for mild pain 90 tablet 2    albuterol (PROVENTIL HFA,VENTOLIN HFA) 90 mcg/act inhaler Inhale 2 puffs every 6 (six) hours as needed for wheezing or shortness of breath 1 Inhaler 4    atorvastatin (LIPITOR) 40 mg tablet Take 1 tablet (40 mg total) by mouth daily 30 tablet 4    busPIRone (BUSPAR) 15 mg tablet Take 15 mg by mouth 3 (three) times a day Pt unsure of frequency      Cholecalciferol (VITAMIN D3) 25 MCG (1000 UT) CAPS Take 1,000 Units by mouth daily       hydrochlorothiazide (HYDRODIURIL) 25 mg tablet Take 1 tablet (25 mg total) by mouth daily 30 tablet 2    lisinopril (ZESTRIL) 20 mg tablet Take 1 tablet (20 mg total) by mouth daily 20 tablet 4    metFORMIN (GLUCOPHAGE) 500 mg tablet Take 1 tablet (500 mg total) by mouth 2 (two) times a day with meals 60 tablet 4    metoprolol tartrate (LOPRESSOR) 25 mg tablet Take 1 tablet (25 mg total) by mouth daily 90 tablet 4    omeprazole (PriLOSEC) 20 mg delayed release capsule Take 2 capsules (40 mg total) by mouth every evening 60 capsule 3    [START ON 10/21/2020] diclofenac sodium (VOLTAREN) 1 % Apply 2 g topically 4 (four) times a day 1 Tube 3    glucose blood (FREESTYLE TEST STRIPS) test strip Test 3xs daily 100 each 2    Lancets (freestyle) lancets TEST 3XS DAILY 100 each 1     No current facility-administered medications for this visit  Allergies on file:   Patient has no known allergies      Patient Active Problem List   Diagnosis    Essential hypertension    Hyperlipidemia    [facility]    Class 2 severe obesity due to excess calories with serious comorbidity and body mass index (BMI) of 37 0 to 37 9 in [facility])    Depression    Endometrial cancer, grade I ([facility] )    Nephrolithiasis    CKD (chronic kidney disease) stage 2, GFR 60-89 ml/min    Left knee pain    Abdominal pain    Preop general physical exam        Past Medical History:   Diagnosis Date    Anxiety     Asthma     Bipolar disorder ([place] [address] )     Cancer ([place] [address] )     CPAP (continuous positive airway pressure) dependence     currently using    Depression     Diabetes mellitus ([place] [address] )     NIDDM    Endometrial cancer ([place] [address] )     3/20    GERD (gastroesophageal reflux disease)     Glaucoma     History of transfusion     2x with birth of both babies 1970and 1983, no reaction    Hyperlipidemia     Hypertension     Kidney stone     Obesity     PMB (postmenopausal bleeding)     Shortness of breath     Sleep apnea     Stroke ([place] [address] ) 2016    Thickened endometrium     Uterus cancer ([place] [address] )     Wears glasses        Past Surgical History:   Procedure Laterality Date    APPENDECTOMY      CHOLECYSTECTOMY      KIDNEY STONE SURGERY      LYMPH NODE DISSECTION N/A 7/9/2020    Procedure: BILATERAL PELVIC EXCISIONAL SENTINEL LYMPH NODE BIOPSIES;  Surgeon: Paola Lr MD;  [facility];  Service: Gynecology Oncology    MD CYSTOURETHROSCOPY,URETER CATHETER Bilateral 7/9/2020    Procedure: CYSTOSCOPY WITH RETROGRADE PYELOGRAM;  Surgeon: Paola Lr MD;  [facility];  Service: Gynecology Oncology    MD HYSTEROSCOPY,W/ENDO BX N/A 5/27/2020    Procedure: DILATATION AND CURETTAGE (D&C) WITH HYSTEROSCOPY;  Surgeon: Drake Snyder MD;  [facility];  Service: Gynecology    MD INJECTION FOR LYMPHATIC XRAY N/A 7/9/2020    Procedure: LYMPHOGRAPHY WITH INDOCYANINE GREEN (ICG);   Surgeon: Paola Lr MD;  [facility];  Service: Gynecology Oncology    MD LAPAROSCOPY W TOT HYSTERECTUTERUS <=250 Daryel Moles TUBE/OVARY N/A 7/9/2020    Procedure: ROBOTIC TOTAL HYSTERECTOMY, BILATERAL SALPINGO-OOPHORECTOMY, PELVIC SENTINEL LYMPH NODE BIOPSIES I;  Surgeon: Paola Lr MD;  [facility];  Service: Gynecology Oncology    TUBAL LIGATION         Family History   Problem Relation Age of Onset    No Known Problems Mother     No Known Problems Maternal Grandmother     No Known Problems Paternal Grandmother     No Known Problems Maternal Aunt        Social History     Tobacco Use    Smoking status: Never Smoker    Smokeless tobacco: Never Used   Substance Use Topics    Alcohol use: Yes     Frequency: Monthly or less     Drinks per session: 1 or 2     Binge frequency: Never    Drug use: Never       Objective:    Vitals:    09/28/20 1039   BP: 132/86   BP Location: Left arm   Patient Position: Sitting   Cuff Size: Large   Pulse: 97   Resp: 18   Temp: 97 6 °F (36 4 °C)   TempSrc: Temporal   SpO2: 98%   Weight: 97 1 kg (214 lb)   Height: 5' 4" (1 626 m)        Physical Exam  Constitutional:       General: She is not in acute distress  Appearance: She is well-developed  She is obese  HENT:      Head: Normocephalic  Right Ear: External ear normal       Left Ear: External ear normal       Nose: Nose normal       Mouth/Throat:      Mouth: Mucous membranes are moist       Pharynx: Oropharynx is clear  Eyes:      Conjunctiva/sclera: Conjunctivae normal    Neck:      Musculoskeletal: Normal range of motion  Thyroid: No thyromegaly  Cardiovascular:      Rate and Rhythm: Normal rate and regular rhythm  Pulses: Normal pulses  Heart sounds: Normal heart sounds  No murmur  No friction rub  No gallop  Pulmonary:      Effort: Pulmonary effort is normal  No respiratory distress  Breath sounds: Normal breath sounds  No wheezing, rhonchi or rales  Chest:      Chest wall: No tenderness  Abdominal:      General: Bowel sounds are normal  There is no distension  Palpations: Abdomen is soft  There is no mass  Tenderness: There is no abdominal tenderness  There is no right CVA tenderness or left CVA tenderness  Hernia: No hernia is present     Musculoskeletal: Normal range of motion  General: Tenderness (MEDIAL ASPECT LEFT KNEE) present  No swelling  Left knee: She exhibits no swelling, no LCL laxity and no MCL laxity  Tenderness found  Medial joint line and MCL tenderness noted  Right lower leg: No edema  Left lower leg: No edema  Skin:     Comments: No signs of rash or infection over intended surgical site   Neurological:      General: No focal deficit present  Mental Status: She is alert and oriented to person, place, and time  Cranial Nerves: No cranial nerve deficit  Psychiatric:         Mood and Affect: Mood normal          Behavior: Behavior normal          Thought Content: Thought content normal          Judgment: Judgment normal            Preop labs/testing available and reviewed:   Yes, see below    COVID Test -ve  Urine Culture demonstrates mixed contaminants (x3) 10-19,000 cfu/mL  Blood Type - A+        Lab Results   Component Value Date    WBC 5 70 09/21/2020    HGB 12 8 09/21/2020    HCT 39 4 09/21/2020    MCV 82 09/21/2020     09/21/2020     Lab Results   Component Value Date    SODIUM 140 09/21/2020    K 4 0 09/21/2020     09/21/2020    CO2 29 09/21/2020    AGAP 7 09/21/2020    BUN 18 09/21/2020    CREATININE 0 89 09/21/2020    GLUC 127 07/10/2020    GLUF 120 (H) 09/21/2020    CALCIUM 10 4 (H) 09/21/2020    AST 32 03/02/2020    ALT 40 03/02/2020    ALKPHOS 72 03/02/2020    TP 8 1 03/02/2020    TBILI 0 50 03/02/2020    EGFR 68 09/21/2020         EKG No (most recent in June 2020 demonstrated Right Bundle Branch Block with Normal Sinus Rhythm and HR 77 and normal axis)    Echo No    Stress test/cath No    PFT/Wicho No    Functional capacity: Climb stairs                        4 Mets   Pick the highest level patient can comfortably perform   4 mets or greater for surgery    RCRI  High Risk surgery?   no       1 Point  CAD History:   no       1 Point   MI; Positive Stress Test; CP due to Mi;  Nitrate Usage to control Angina; Pathologic Q wave on EKG  CHF Active:   no       1 Point   Pulm Edema; Paroxysmal Nocturnal Dyspnea;  Bibasilar Rales (crackles);S3; CHF on CXR  Cerebrovascular Disease (TIA or CVA):   yes   1 Point  DM on Insulin:  no       1 Point  Serum Creat >2 0 mg/dl:  no      1 Point          Total Points: 1     Scorin: Class I, Very Low Risk (0 4%)     1: Class II, Low risk (0 9%)     2: Class III Moderate (6 6%)     3: Class IV High (>11%)      ZOE Risk:  GFR:   eGFR   Date Value Ref Range Status   2020 68 >60 ml/min/1 73sq m Final         Assessment/Plan:    Patient is medically optimized (cleared) for the planned procedure  Further testing/evaluation is not required  Postop concerns: yes    Problem List Items Addressed This Visit        Other    Left knee pain      In light of hx of gynecological malignancy will do x-ray to left knee  continue Tylenol  p o  Q 8 hourly  Add diclofenac gel 1% AAA  Q i d  ( start 1 week after surgery, in light of history of CKD and ZOE )         Relevant Medications    acetaminophen (TYLENOL) 650 mg CR tablet    diclofenac sodium (VOLTAREN) 1 % (Start on 10/21/2020)    Other Relevant Orders    XR knee 3 vw left non injury    Preop general physical exam - Primary     Surgery:  Percutaneous Nephrolithotomy, Left  Date:   10/14/2020  Surgeon: Dr Anatoly Green    Recommendation:    1  Patient medically cleared for surgery   2  HOLD Lisinopril the day prior to surgery and  3  HOLD  Lisinopril and HCTZ on the day of surgery  Monitor GFR, urine output and BP to guide when to resume these medications  Ensure adequate IVF to minimize risk of ZOE on background of Stage 2 CKD                Diagnoses and all orders for this visit:    Preop general physical exam    Chronic pain of left knee  -     acetaminophen (TYLENOL) 650 mg CR tablet;  Take 1 tablet (650 mg total) by mouth every 8 (eight) hours as needed for mild pain  -     diclofenac sodium (VOLTAREN) 1 %; Apply 2 g topically 4 (four) times a day  -     XR knee 3 vw left non injury;  Future

## 2020-09-28 NOTE — ASSESSMENT & PLAN NOTE
In light of hx of gynecological malignancy will do x-ray to left knee     continue Tylenol  p o  Q 8 hourly  Add diclofenac gel 1% AAA  Q i d  ( start 1 week after surgery, in light of history of CKD and ZOE )

## 2020-09-28 NOTE — ASSESSMENT & PLAN NOTE
Surgery:  Percutaneous Nephrolithotomy, Left  Date:   10/14/2020  Surgeon: Dr Elio Simpson    Recommendation:    1  Patient medically cleared for surgery   2  HOLD Lisinopril the day prior to surgery and  3  HOLD  Lisinopril and HCTZ on the day of surgery  Monitor GFR, urine output and BP to guide when to resume these medications   Ensure adequate IVF to minimize risk of ZOE on background of Stage 2 CKD

## 2020-09-29 ENCOUNTER — ANESTHESIA EVENT (OUTPATIENT)
Dept: PERIOP | Facility: HOSPITAL | Age: 65
End: 2020-09-29
Payer: COMMERCIAL

## 2020-10-07 ENCOUNTER — TELEPHONE (OUTPATIENT)
Dept: RADIOLOGY | Facility: HOSPITAL | Age: 65
End: 2020-10-07

## 2020-10-09 ENCOUNTER — TELEPHONE (OUTPATIENT)
Dept: SURGERY | Facility: HOSPITAL | Age: 65
End: 2020-10-09

## 2020-10-12 ENCOUNTER — HOSPITAL ENCOUNTER (OUTPATIENT)
Dept: RADIOLOGY | Facility: HOSPITAL | Age: 65
Discharge: HOME | End: 2020-10-12
Admitting: RADIOLOGY
Payer: COMMERCIAL

## 2020-10-12 ENCOUNTER — ANESTHESIA (OUTPATIENT)
Dept: RADIOLOGY | Facility: HOSPITAL | Age: 65
End: 2020-10-12

## 2020-10-12 ENCOUNTER — ANESTHESIA EVENT (OUTPATIENT)
Dept: RADIOLOGY | Facility: HOSPITAL | Age: 65
End: 2020-10-12

## 2020-10-12 VITALS — HEART RATE: 83 BPM

## 2020-10-12 VITALS
RESPIRATION RATE: 15 BRPM | SYSTOLIC BLOOD PRESSURE: 157 MMHG | HEART RATE: 93 BPM | OXYGEN SATURATION: 95 % | DIASTOLIC BLOOD PRESSURE: 88 MMHG | TEMPERATURE: 98.1 F

## 2020-10-12 DIAGNOSIS — N20.0 NEPHROLITHIASIS: ICD-10-CM

## 2020-10-12 LAB
INR PPP: 0.98 (ref 0.84–1.19)
PROTHROMBIN TIME: 12.8 SECONDS (ref 11.6–14.5)

## 2020-10-12 PROCEDURE — 85610 PROTHROMBIN TIME: CPT | Performed by: NURSE PRACTITIONER

## 2020-10-12 PROCEDURE — C1769 GUIDE WIRE: HCPCS

## 2020-10-12 PROCEDURE — 50432 PLMT NEPHROSTOMY CATHETER: CPT

## 2020-10-12 PROCEDURE — 50433 PLMT NEPHROURETERAL CATHETER: CPT | Performed by: RADIOLOGY

## 2020-10-12 PROCEDURE — NC001 PR NO CHARGE: Performed by: UROLOGY

## 2020-10-12 PROCEDURE — 82948 REAGENT STRIP/BLOOD GLUCOSE: CPT

## 2020-10-12 PROCEDURE — C1894 INTRO/SHEATH, NON-LASER: HCPCS

## 2020-10-12 PROCEDURE — C2625 STENT, NON-COR, TEM W/DEL SY: HCPCS

## 2020-10-12 RX ORDER — NEOSTIGMINE METHYLSULFATE 1 MG/ML
INJECTION INTRAVENOUS AS NEEDED
Status: DISCONTINUED | OUTPATIENT
Start: 2020-10-12 | End: 2020-10-12

## 2020-10-12 RX ORDER — DEXAMETHASONE SODIUM PHOSPHATE 4 MG/ML
INJECTION, SOLUTION INTRA-ARTICULAR; INTRALESIONAL; INTRAMUSCULAR; INTRAVENOUS; SOFT TISSUE AS NEEDED
Status: DISCONTINUED | OUTPATIENT
Start: 2020-10-12 | End: 2020-10-12

## 2020-10-12 RX ORDER — FENTANYL CITRATE 50 UG/ML
INJECTION, SOLUTION INTRAMUSCULAR; INTRAVENOUS AS NEEDED
Status: DISCONTINUED | OUTPATIENT
Start: 2020-10-12 | End: 2020-10-12

## 2020-10-12 RX ORDER — LIDOCAINE WITH 8.4% SOD BICARB 0.9%(10ML)
SYRINGE (ML) INJECTION CODE/TRAUMA/SEDATION MEDICATION
Status: COMPLETED | OUTPATIENT
Start: 2020-10-12 | End: 2020-10-12

## 2020-10-12 RX ORDER — GLYCOPYRROLATE 0.2 MG/ML
INJECTION INTRAMUSCULAR; INTRAVENOUS AS NEEDED
Status: DISCONTINUED | OUTPATIENT
Start: 2020-10-12 | End: 2020-10-12

## 2020-10-12 RX ORDER — ROCURONIUM BROMIDE 10 MG/ML
INJECTION, SOLUTION INTRAVENOUS AS NEEDED
Status: DISCONTINUED | OUTPATIENT
Start: 2020-10-12 | End: 2020-10-12

## 2020-10-12 RX ORDER — CEFAZOLIN SODIUM 2 G/50ML
2000 SOLUTION INTRAVENOUS ONCE
Status: DISCONTINUED | OUTPATIENT
Start: 2020-10-12 | End: 2020-10-12

## 2020-10-12 RX ORDER — PROPOFOL 10 MG/ML
INJECTION, EMULSION INTRAVENOUS AS NEEDED
Status: DISCONTINUED | OUTPATIENT
Start: 2020-10-12 | End: 2020-10-12

## 2020-10-12 RX ORDER — OXYCODONE HYDROCHLORIDE 5 MG/1
5 TABLET ORAL EVERY 4 HOURS PRN
Status: DISCONTINUED | OUTPATIENT
Start: 2020-10-12 | End: 2020-10-13 | Stop reason: HOSPADM

## 2020-10-12 RX ORDER — CEFAZOLIN SODIUM 2 G/50ML
2000 SOLUTION INTRAVENOUS ONCE
Status: DISCONTINUED | OUTPATIENT
Start: 2020-10-12 | End: 2020-10-12 | Stop reason: SDUPTHER

## 2020-10-12 RX ORDER — LIDOCAINE HYDROCHLORIDE 20 MG/ML
INJECTION, SOLUTION INFILTRATION; PERINEURAL AS NEEDED
Status: DISCONTINUED | OUTPATIENT
Start: 2020-10-12 | End: 2020-10-12

## 2020-10-12 RX ORDER — ALBUTEROL SULFATE 2.5 MG/3ML
2.5 SOLUTION RESPIRATORY (INHALATION) ONCE AS NEEDED
Status: DISCONTINUED | OUTPATIENT
Start: 2020-10-12 | End: 2020-10-12 | Stop reason: HOSPADM

## 2020-10-12 RX ORDER — ONDANSETRON 2 MG/ML
INJECTION INTRAMUSCULAR; INTRAVENOUS AS NEEDED
Status: DISCONTINUED | OUTPATIENT
Start: 2020-10-12 | End: 2020-10-12

## 2020-10-12 RX ORDER — CEFAZOLIN SODIUM 2 G/50ML
SOLUTION INTRAVENOUS AS NEEDED
Status: DISCONTINUED | OUTPATIENT
Start: 2020-10-12 | End: 2020-10-12

## 2020-10-12 RX ORDER — FENTANYL CITRATE/PF 50 MCG/ML
25 SYRINGE (ML) INJECTION
Status: DISCONTINUED | OUTPATIENT
Start: 2020-10-12 | End: 2020-10-12 | Stop reason: HOSPADM

## 2020-10-12 RX ORDER — SODIUM CHLORIDE 9 MG/ML
125 INJECTION, SOLUTION INTRAVENOUS CONTINUOUS
Status: DISCONTINUED | OUTPATIENT
Start: 2020-10-12 | End: 2020-10-13 | Stop reason: HOSPADM

## 2020-10-12 RX ORDER — MIDAZOLAM HYDROCHLORIDE 2 MG/2ML
INJECTION, SOLUTION INTRAMUSCULAR; INTRAVENOUS AS NEEDED
Status: DISCONTINUED | OUTPATIENT
Start: 2020-10-12 | End: 2020-10-12

## 2020-10-12 RX ADMIN — FENTANYL CITRATE 50 MCG: 50 INJECTION, SOLUTION INTRAMUSCULAR; INTRAVENOUS at 11:10

## 2020-10-12 RX ADMIN — Medication 10 ML: at 11:32

## 2020-10-12 RX ADMIN — SODIUM CHLORIDE 125 ML/HR: 0.9 INJECTION, SOLUTION INTRAVENOUS at 09:36

## 2020-10-12 RX ADMIN — PHENYLEPHRINE HYDROCHLORIDE 100 MCG: 10 INJECTION INTRAVENOUS at 12:22

## 2020-10-12 RX ADMIN — ROCURONIUM BROMIDE 30 MG: 10 INJECTION, SOLUTION INTRAVENOUS at 11:10

## 2020-10-12 RX ADMIN — PHENYLEPHRINE HYDROCHLORIDE 100 MCG: 10 INJECTION INTRAVENOUS at 12:34

## 2020-10-12 RX ADMIN — PROPOFOL 150 MG: 10 INJECTION, EMULSION INTRAVENOUS at 11:10

## 2020-10-12 RX ADMIN — PHENYLEPHRINE HYDROCHLORIDE 100 MCG: 10 INJECTION INTRAVENOUS at 11:37

## 2020-10-12 RX ADMIN — ONDANSETRON 4 MG: 2 INJECTION INTRAMUSCULAR; INTRAVENOUS at 11:20

## 2020-10-12 RX ADMIN — CEFAZOLIN SODIUM 2000 MG: 2 SOLUTION INTRAVENOUS at 11:02

## 2020-10-12 RX ADMIN — PHENYLEPHRINE HYDROCHLORIDE 100 MCG: 10 INJECTION INTRAVENOUS at 11:41

## 2020-10-12 RX ADMIN — PHENYLEPHRINE HYDROCHLORIDE 100 MCG: 10 INJECTION INTRAVENOUS at 11:43

## 2020-10-12 RX ADMIN — SODIUM CHLORIDE: 0.9 INJECTION, SOLUTION INTRAVENOUS at 12:42

## 2020-10-12 RX ADMIN — PHENYLEPHRINE HYDROCHLORIDE 100 MCG: 10 INJECTION INTRAVENOUS at 11:49

## 2020-10-12 RX ADMIN — PHENYLEPHRINE HYDROCHLORIDE 100 MCG: 10 INJECTION INTRAVENOUS at 11:47

## 2020-10-12 RX ADMIN — NEOSTIGMINE METHYLSULFATE 4 MG: 1 INJECTION, SOLUTION INTRAVENOUS at 12:40

## 2020-10-12 RX ADMIN — MIDAZOLAM 2 MG: 1 INJECTION INTRAMUSCULAR; INTRAVENOUS at 11:02

## 2020-10-12 RX ADMIN — GLYCOPYRROLATE 0.4 MG: 0.2 INJECTION, SOLUTION INTRAMUSCULAR; INTRAVENOUS at 12:40

## 2020-10-12 RX ADMIN — ROCURONIUM BROMIDE 10 MG: 10 INJECTION, SOLUTION INTRAVENOUS at 12:12

## 2020-10-12 RX ADMIN — PHENYLEPHRINE HYDROCHLORIDE 100 MCG: 10 INJECTION INTRAVENOUS at 12:11

## 2020-10-12 RX ADMIN — PHENYLEPHRINE HYDROCHLORIDE 100 MCG: 10 INJECTION INTRAVENOUS at 11:59

## 2020-10-12 RX ADMIN — DEXAMETHASONE SODIUM PHOSPHATE 4 MG: 4 INJECTION, SOLUTION INTRAMUSCULAR; INTRAVENOUS at 11:20

## 2020-10-12 RX ADMIN — FENTANYL CITRATE 50 MCG: 50 INJECTION, SOLUTION INTRAMUSCULAR; INTRAVENOUS at 11:19

## 2020-10-12 RX ADMIN — LIDOCAINE HYDROCHLORIDE 80 ML: 20 INJECTION, SOLUTION INFILTRATION; PERINEURAL at 11:10

## 2020-10-12 RX ADMIN — OXYCODONE HYDROCHLORIDE 5 MG: 5 TABLET ORAL at 15:00

## 2020-10-12 RX ADMIN — PHENYLEPHRINE HYDROCHLORIDE 100 MCG: 10 INJECTION INTRAVENOUS at 11:30

## 2020-10-12 RX ADMIN — PHENYLEPHRINE HYDROCHLORIDE 200 MCG: 10 INJECTION INTRAVENOUS at 12:04

## 2020-10-12 RX ADMIN — IOHEXOL 110 ML: 350 INJECTION, SOLUTION INTRAVENOUS at 11:58

## 2020-10-13 ENCOUNTER — TELEPHONE (OUTPATIENT)
Dept: SURGERY | Facility: HOSPITAL | Age: 65
End: 2020-10-13

## 2020-10-13 DIAGNOSIS — N20.0 NEPHROLITHIASIS: Primary | ICD-10-CM

## 2020-10-13 LAB — GLUCOSE SERPL-MCNC: 124 MG/DL (ref 65–140)

## 2020-10-13 RX ORDER — SODIUM CHLORIDE 9 MG/ML
10 INJECTION INTRAVENOUS DAILY
Qty: 30 SYRINGE | Refills: 3 | Status: SHIPPED | OUTPATIENT
Start: 2020-10-13 | End: 2020-11-12 | Stop reason: HOSPADM

## 2020-10-13 RX ORDER — HYDROXYZINE HYDROCHLORIDE 25 MG/1
25 TABLET, FILM COATED ORAL
COMMUNITY

## 2020-10-13 RX ORDER — BUPROPION HYDROCHLORIDE 100 MG/1
100 TABLET ORAL DAILY
COMMUNITY

## 2020-10-14 ENCOUNTER — HOSPITAL ENCOUNTER (OUTPATIENT)
Facility: HOSPITAL | Age: 65
Setting detail: OUTPATIENT SURGERY
Discharge: HOME | End: 2020-10-15
Attending: UROLOGY | Admitting: UROLOGY
Payer: COMMERCIAL

## 2020-10-14 ENCOUNTER — ANESTHESIA (OUTPATIENT)
Dept: PERIOP | Facility: HOSPITAL | Age: 65
End: 2020-10-14
Payer: COMMERCIAL

## 2020-10-14 ENCOUNTER — APPOINTMENT (OUTPATIENT)
Dept: RADIOLOGY | Facility: HOSPITAL | Age: 65
End: 2020-10-14
Attending: UROLOGY
Payer: COMMERCIAL

## 2020-10-14 VITALS — HEART RATE: 84 BPM

## 2020-10-14 DIAGNOSIS — N20.0 KIDNEY STONE: ICD-10-CM

## 2020-10-14 DIAGNOSIS — N13.30 HYDRONEPHROSIS, UNSPECIFIED HYDRONEPHROSIS TYPE: ICD-10-CM

## 2020-10-14 LAB
ANION GAP SERPL CALCULATED.3IONS-SCNC: 9 MMOL/L (ref 4–13)
BUN SERPL-MCNC: 21 MG/DL (ref 5–25)
CALCIUM SERPL-MCNC: 9.1 MG/DL (ref 8.3–10.1)
CHLORIDE SERPL-SCNC: 103 MMOL/L (ref 100–108)
CO2 SERPL-SCNC: 25 MMOL/L (ref 21–32)
CREAT SERPL-MCNC: 1.45 MG/DL (ref 0.6–1.3)
ERYTHROCYTE [DISTWIDTH] IN BLOOD BY AUTOMATED COUNT: 14.9 % (ref 11.6–15.1)
GFR SERPL CREATININE-BSD FRML MDRD: 38 ML/MIN/1.73SQ M
GLUCOSE P FAST SERPL-MCNC: 193 MG/DL (ref 65–99)
GLUCOSE SERPL-MCNC: 116 MG/DL (ref 65–140)
GLUCOSE SERPL-MCNC: 182 MG/DL (ref 65–140)
GLUCOSE SERPL-MCNC: 193 MG/DL (ref 65–140)
HCT VFR BLD AUTO: 35.1 % (ref 34.8–46.1)
HGB BLD-MCNC: 10.6 G/DL (ref 11.5–15.4)
MCH RBC QN AUTO: 25.7 PG (ref 26.8–34.3)
MCHC RBC AUTO-ENTMCNC: 30.2 G/DL (ref 31.4–37.4)
MCV RBC AUTO: 85 FL (ref 82–98)
PLATELET # BLD AUTO: 231 THOUSANDS/UL (ref 149–390)
PMV BLD AUTO: 10.9 FL (ref 8.9–12.7)
POTASSIUM SERPL-SCNC: 4.3 MMOL/L (ref 3.5–5.3)
RBC # BLD AUTO: 4.12 MILLION/UL (ref 3.81–5.12)
SARS-COV-2 RNA RESP QL NAA+PROBE: NEGATIVE
SODIUM SERPL-SCNC: 137 MMOL/L (ref 136–145)
WBC # BLD AUTO: 13.97 THOUSAND/UL (ref 4.31–10.16)

## 2020-10-14 PROCEDURE — 50432 PLMT NEPHROSTOMY CATHETER: CPT | Performed by: UROLOGY

## 2020-10-14 PROCEDURE — C1726 CATH, BAL DIL, NON-VASCULAR: HCPCS | Performed by: UROLOGY

## 2020-10-14 PROCEDURE — 76000 FLUOROSCOPY <1 HR PHYS/QHP: CPT | Performed by: RADIOLOGY

## 2020-10-14 PROCEDURE — 82948 REAGENT STRIP/BLOOD GLUCOSE: CPT

## 2020-10-14 PROCEDURE — 80048 BASIC METABOLIC PNL TOTAL CA: CPT | Performed by: UROLOGY

## 2020-10-14 PROCEDURE — 82947 ASSAY GLUCOSE BLOOD QUANT: CPT

## 2020-10-14 PROCEDURE — 87635 SARS-COV-2 COVID-19 AMP PRB: CPT | Performed by: UROLOGY

## 2020-10-14 PROCEDURE — 50081 PERQ NL/PL LITHOTRP CPLX>2CM: CPT | Performed by: UROLOGY

## 2020-10-14 PROCEDURE — C1769 GUIDE WIRE: HCPCS | Performed by: UROLOGY

## 2020-10-14 PROCEDURE — C2617 STENT, NON-COR, TEM W/O DEL: HCPCS | Performed by: UROLOGY

## 2020-10-14 PROCEDURE — 84295 ASSAY OF SERUM SODIUM: CPT

## 2020-10-14 PROCEDURE — 84132 ASSAY OF SERUM POTASSIUM: CPT

## 2020-10-14 PROCEDURE — 82330 ASSAY OF CALCIUM: CPT

## 2020-10-14 PROCEDURE — 82803 BLOOD GASES ANY COMBINATION: CPT

## 2020-10-14 PROCEDURE — 85027 COMPLETE CBC AUTOMATED: CPT | Performed by: UROLOGY

## 2020-10-14 PROCEDURE — 85014 HEMATOCRIT: CPT

## 2020-10-14 PROCEDURE — C1758 CATHETER, URETERAL: HCPCS | Performed by: UROLOGY

## 2020-10-14 PROCEDURE — 82360 CALCULUS ASSAY QUANT: CPT | Performed by: UROLOGY

## 2020-10-14 DEVICE — INLAY OPTIMA URETERAL STENT W/O GUIDEWIRE
Type: IMPLANTABLE DEVICE | Site: URETER | Status: NON-FUNCTIONAL
Brand: BARD® INLAY OPTIMA® URETERAL STENT
Removed: 2020-11-12

## 2020-10-14 RX ORDER — LISINOPRIL 20 MG/1
20 TABLET ORAL DAILY
Status: DISCONTINUED | OUTPATIENT
Start: 2020-10-15 | End: 2020-10-15 | Stop reason: HOSPADM

## 2020-10-14 RX ORDER — DEXAMETHASONE SODIUM PHOSPHATE 4 MG/ML
INJECTION, SOLUTION INTRA-ARTICULAR; INTRALESIONAL; INTRAMUSCULAR; INTRAVENOUS; SOFT TISSUE AS NEEDED
Status: DISCONTINUED | OUTPATIENT
Start: 2020-10-14 | End: 2020-10-14

## 2020-10-14 RX ORDER — SODIUM CHLORIDE 9 MG/ML
INJECTION, SOLUTION INTRAVENOUS CONTINUOUS PRN
Status: DISCONTINUED | OUTPATIENT
Start: 2020-10-14 | End: 2020-10-14

## 2020-10-14 RX ORDER — SUCCINYLCHOLINE/SOD CL,ISO/PF 100 MG/5ML
SYRINGE (ML) INTRAVENOUS AS NEEDED
Status: DISCONTINUED | OUTPATIENT
Start: 2020-10-14 | End: 2020-10-14

## 2020-10-14 RX ORDER — BUPROPION HYDROCHLORIDE 100 MG/1
100 TABLET ORAL DAILY
Status: DISCONTINUED | OUTPATIENT
Start: 2020-10-15 | End: 2020-10-15 | Stop reason: HOSPADM

## 2020-10-14 RX ORDER — EPHEDRINE SULFATE 50 MG/ML
INJECTION INTRAVENOUS AS NEEDED
Status: DISCONTINUED | OUTPATIENT
Start: 2020-10-14 | End: 2020-10-14

## 2020-10-14 RX ORDER — ONDANSETRON 2 MG/ML
4 INJECTION INTRAMUSCULAR; INTRAVENOUS EVERY 6 HOURS PRN
Status: DISCONTINUED | OUTPATIENT
Start: 2020-10-14 | End: 2020-10-15 | Stop reason: HOSPADM

## 2020-10-14 RX ORDER — SODIUM CHLORIDE, SODIUM LACTATE, POTASSIUM CHLORIDE, CALCIUM CHLORIDE 600; 310; 30; 20 MG/100ML; MG/100ML; MG/100ML; MG/100ML
125 INJECTION, SOLUTION INTRAVENOUS CONTINUOUS
Status: DISCONTINUED | OUTPATIENT
Start: 2020-10-14 | End: 2020-10-15 | Stop reason: HOSPADM

## 2020-10-14 RX ORDER — NEOSTIGMINE METHYLSULFATE 1 MG/ML
INJECTION INTRAVENOUS AS NEEDED
Status: DISCONTINUED | OUTPATIENT
Start: 2020-10-14 | End: 2020-10-14

## 2020-10-14 RX ORDER — OXYCODONE HYDROCHLORIDE 5 MG/1
5 TABLET ORAL EVERY 4 HOURS PRN
Status: DISCONTINUED | OUTPATIENT
Start: 2020-10-14 | End: 2020-10-15 | Stop reason: HOSPADM

## 2020-10-14 RX ORDER — PROPOFOL 10 MG/ML
INJECTION, EMULSION INTRAVENOUS AS NEEDED
Status: DISCONTINUED | OUTPATIENT
Start: 2020-10-14 | End: 2020-10-14

## 2020-10-14 RX ORDER — HYDROXYZINE HYDROCHLORIDE 25 MG/1
25 TABLET, FILM COATED ORAL
Status: DISCONTINUED | OUTPATIENT
Start: 2020-10-14 | End: 2020-10-15 | Stop reason: HOSPADM

## 2020-10-14 RX ORDER — ROCURONIUM BROMIDE 10 MG/ML
INJECTION, SOLUTION INTRAVENOUS AS NEEDED
Status: DISCONTINUED | OUTPATIENT
Start: 2020-10-14 | End: 2020-10-14

## 2020-10-14 RX ORDER — ACETAMINOPHEN 325 MG/1
975 TABLET ORAL EVERY 6 HOURS SCHEDULED
Status: DISCONTINUED | OUTPATIENT
Start: 2020-10-14 | End: 2020-10-15 | Stop reason: HOSPADM

## 2020-10-14 RX ORDER — ATORVASTATIN CALCIUM 40 MG/1
40 TABLET, FILM COATED ORAL
Status: DISCONTINUED | OUTPATIENT
Start: 2020-10-14 | End: 2020-10-15 | Stop reason: HOSPADM

## 2020-10-14 RX ORDER — FENTANYL CITRATE/PF 50 MCG/ML
25 SYRINGE (ML) INJECTION
Status: COMPLETED | OUTPATIENT
Start: 2020-10-14 | End: 2020-10-14

## 2020-10-14 RX ORDER — GLYCOPYRROLATE 0.2 MG/ML
INJECTION INTRAMUSCULAR; INTRAVENOUS AS NEEDED
Status: DISCONTINUED | OUTPATIENT
Start: 2020-10-14 | End: 2020-10-14

## 2020-10-14 RX ORDER — ALBUTEROL SULFATE 90 UG/1
2 AEROSOL, METERED RESPIRATORY (INHALATION) EVERY 6 HOURS PRN
Status: DISCONTINUED | OUTPATIENT
Start: 2020-10-14 | End: 2020-10-15 | Stop reason: HOSPADM

## 2020-10-14 RX ORDER — LIDOCAINE HYDROCHLORIDE 20 MG/ML
INJECTION, SOLUTION EPIDURAL; INFILTRATION; INTRACAUDAL; PERINEURAL AS NEEDED
Status: DISCONTINUED | OUTPATIENT
Start: 2020-10-14 | End: 2020-10-14

## 2020-10-14 RX ORDER — FENTANYL CITRATE 50 UG/ML
INJECTION, SOLUTION INTRAMUSCULAR; INTRAVENOUS AS NEEDED
Status: DISCONTINUED | OUTPATIENT
Start: 2020-10-14 | End: 2020-10-14

## 2020-10-14 RX ORDER — TAMSULOSIN HYDROCHLORIDE 0.4 MG/1
0.4 CAPSULE ORAL
Status: DISCONTINUED | OUTPATIENT
Start: 2020-10-14 | End: 2020-10-15 | Stop reason: HOSPADM

## 2020-10-14 RX ORDER — CEFAZOLIN SODIUM 2 G/50ML
2000 SOLUTION INTRAVENOUS ONCE
Status: COMPLETED | OUTPATIENT
Start: 2020-10-14 | End: 2020-10-14

## 2020-10-14 RX ORDER — CEFAZOLIN SODIUM 1 G/50ML
1000 SOLUTION INTRAVENOUS EVERY 8 HOURS
Status: COMPLETED | OUTPATIENT
Start: 2020-10-14 | End: 2020-10-15

## 2020-10-14 RX ORDER — OMEGA-3S/DHA/EPA/FISH OIL/D3 300MG-1000
400 CAPSULE ORAL DAILY
Status: DISCONTINUED | OUTPATIENT
Start: 2020-10-15 | End: 2020-10-15 | Stop reason: HOSPADM

## 2020-10-14 RX ORDER — ONDANSETRON 2 MG/ML
4 INJECTION INTRAMUSCULAR; INTRAVENOUS ONCE AS NEEDED
Status: DISCONTINUED | OUTPATIENT
Start: 2020-10-14 | End: 2020-10-14 | Stop reason: HOSPADM

## 2020-10-14 RX ORDER — MIDAZOLAM HYDROCHLORIDE 2 MG/2ML
INJECTION, SOLUTION INTRAMUSCULAR; INTRAVENOUS AS NEEDED
Status: DISCONTINUED | OUTPATIENT
Start: 2020-10-14 | End: 2020-10-14

## 2020-10-14 RX ORDER — PANTOPRAZOLE SODIUM 20 MG/1
20 TABLET, DELAYED RELEASE ORAL
Status: DISCONTINUED | OUTPATIENT
Start: 2020-10-14 | End: 2020-10-15 | Stop reason: HOSPADM

## 2020-10-14 RX ORDER — HYDROCHLOROTHIAZIDE 25 MG/1
25 TABLET ORAL DAILY
Status: DISCONTINUED | OUTPATIENT
Start: 2020-10-15 | End: 2020-10-15 | Stop reason: HOSPADM

## 2020-10-14 RX ORDER — ONDANSETRON 2 MG/ML
INJECTION INTRAMUSCULAR; INTRAVENOUS AS NEEDED
Status: DISCONTINUED | OUTPATIENT
Start: 2020-10-14 | End: 2020-10-14

## 2020-10-14 RX ORDER — MAGNESIUM HYDROXIDE 1200 MG/15ML
LIQUID ORAL AS NEEDED
Status: DISCONTINUED | OUTPATIENT
Start: 2020-10-14 | End: 2020-10-14 | Stop reason: HOSPADM

## 2020-10-14 RX ADMIN — GLYCOPYRROLATE 0.4 MG: 0.2 INJECTION, SOLUTION INTRAMUSCULAR; INTRAVENOUS at 17:58

## 2020-10-14 RX ADMIN — HYDROXYZINE HYDROCHLORIDE 25 MG: 25 TABLET, FILM COATED ORAL at 22:41

## 2020-10-14 RX ADMIN — SODIUM CHLORIDE, SODIUM LACTATE, POTASSIUM CHLORIDE, AND CALCIUM CHLORIDE 125 ML/HR: .6; .31; .03; .02 INJECTION, SOLUTION INTRAVENOUS at 10:49

## 2020-10-14 RX ADMIN — CEFAZOLIN SODIUM 2000 MG: 2 SOLUTION INTRAVENOUS at 14:40

## 2020-10-14 RX ADMIN — ONDANSETRON 4 MG: 2 INJECTION INTRAMUSCULAR; INTRAVENOUS at 15:26

## 2020-10-14 RX ADMIN — DEXAMETHASONE SODIUM PHOSPHATE 4 MG: 4 INJECTION, SOLUTION INTRAMUSCULAR; INTRAVENOUS at 15:26

## 2020-10-14 RX ADMIN — ROCURONIUM BROMIDE 10 MG: 10 INJECTION, SOLUTION INTRAVENOUS at 14:51

## 2020-10-14 RX ADMIN — SODIUM CHLORIDE: 0.9 INJECTION, SOLUTION INTRAVENOUS at 15:09

## 2020-10-14 RX ADMIN — FENTANYL CITRATE 25 MCG: 50 INJECTION INTRAMUSCULAR; INTRAVENOUS at 19:25

## 2020-10-14 RX ADMIN — FENTANYL CITRATE 100 MCG: 50 INJECTION, SOLUTION INTRAMUSCULAR; INTRAVENOUS at 15:31

## 2020-10-14 RX ADMIN — ROCURONIUM BROMIDE 40 MG: 10 INJECTION, SOLUTION INTRAVENOUS at 15:02

## 2020-10-14 RX ADMIN — CEFAZOLIN SODIUM 1000 MG: 1 SOLUTION INTRAVENOUS at 22:43

## 2020-10-14 RX ADMIN — ATORVASTATIN CALCIUM 40 MG: 40 TABLET, FILM COATED ORAL at 20:38

## 2020-10-14 RX ADMIN — PHENYLEPHRINE HYDROCHLORIDE 100 MCG: 10 INJECTION INTRAVENOUS at 17:33

## 2020-10-14 RX ADMIN — METFORMIN HYDROCHLORIDE 500 MG: 500 TABLET ORAL at 20:38

## 2020-10-14 RX ADMIN — FENTANYL CITRATE 100 MCG: 50 INJECTION, SOLUTION INTRAMUSCULAR; INTRAVENOUS at 14:49

## 2020-10-14 RX ADMIN — SODIUM CHLORIDE, SODIUM LACTATE, POTASSIUM CHLORIDE, AND CALCIUM CHLORIDE 125 ML/HR: .6; .31; .03; .02 INJECTION, SOLUTION INTRAVENOUS at 20:38

## 2020-10-14 RX ADMIN — FENTANYL CITRATE 25 MCG: 50 INJECTION INTRAMUSCULAR; INTRAVENOUS at 18:41

## 2020-10-14 RX ADMIN — TAMSULOSIN HYDROCHLORIDE 0.4 MG: 0.4 CAPSULE ORAL at 20:38

## 2020-10-14 RX ADMIN — FENTANYL CITRATE 25 MCG: 50 INJECTION INTRAMUSCULAR; INTRAVENOUS at 18:46

## 2020-10-14 RX ADMIN — LIDOCAINE HYDROCHLORIDE 50 MG: 20 INJECTION, SOLUTION EPIDURAL; INFILTRATION; INTRACAUDAL; PERINEURAL at 14:51

## 2020-10-14 RX ADMIN — ACETAMINOPHEN 975 MG: 325 TABLET ORAL at 20:39

## 2020-10-14 RX ADMIN — FENTANYL CITRATE 25 MCG: 50 INJECTION INTRAMUSCULAR; INTRAVENOUS at 19:18

## 2020-10-14 RX ADMIN — Medication 100 MG: at 14:51

## 2020-10-14 RX ADMIN — PANTOPRAZOLE SODIUM 20 MG: 20 TABLET, DELAYED RELEASE ORAL at 22:42

## 2020-10-14 RX ADMIN — PROPOFOL 200 MG: 10 INJECTION, EMULSION INTRAVENOUS at 14:51

## 2020-10-14 RX ADMIN — NEOSTIGMINE METHYLSULFATE 3 MG: 1 INJECTION, SOLUTION INTRAVENOUS at 17:58

## 2020-10-14 RX ADMIN — EPHEDRINE SULFATE 10 MG: 50 INJECTION, SOLUTION INTRAVENOUS at 15:12

## 2020-10-14 RX ADMIN — MIDAZOLAM 2 MG: 1 INJECTION INTRAMUSCULAR; INTRAVENOUS at 14:47

## 2020-10-15 ENCOUNTER — TELEPHONE (OUTPATIENT)
Dept: OTHER | Facility: HOSPITAL | Age: 65
End: 2020-10-15

## 2020-10-15 ENCOUNTER — APPOINTMENT (OUTPATIENT)
Dept: RADIOLOGY | Facility: HOSPITAL | Age: 65
End: 2020-10-15
Payer: COMMERCIAL

## 2020-10-15 VITALS
RESPIRATION RATE: 18 BRPM | OXYGEN SATURATION: 97 % | HEART RATE: 82 BPM | WEIGHT: 210 LBS | BODY MASS INDEX: 35.85 KG/M2 | HEIGHT: 64 IN | SYSTOLIC BLOOD PRESSURE: 102 MMHG | DIASTOLIC BLOOD PRESSURE: 60 MMHG | TEMPERATURE: 97.5 F

## 2020-10-15 LAB
ANION GAP SERPL CALCULATED.3IONS-SCNC: 11 MMOL/L (ref 4–13)
BASE EXCESS BLDA CALC-SCNC: -3 MMOL/L (ref -2–3)
BUN SERPL-MCNC: 19 MG/DL (ref 5–25)
CA-I BLD-SCNC: 1.21 MMOL/L (ref 1.12–1.32)
CALCIUM SERPL-MCNC: 8.6 MG/DL (ref 8.3–10.1)
CHLORIDE SERPL-SCNC: 101 MMOL/L (ref 100–108)
CO2 SERPL-SCNC: 23 MMOL/L (ref 21–32)
CREAT SERPL-MCNC: 1.45 MG/DL (ref 0.6–1.3)
ERYTHROCYTE [DISTWIDTH] IN BLOOD BY AUTOMATED COUNT: 14.9 % (ref 11.6–15.1)
GFR SERPL CREATININE-BSD FRML MDRD: 38 ML/MIN/1.73SQ M
GLUCOSE SERPL-MCNC: 159 MG/DL (ref 65–140)
GLUCOSE SERPL-MCNC: 166 MG/DL (ref 65–140)
HCO3 BLDA-SCNC: 21.2 MMOL/L (ref 22–28)
HCT VFR BLD AUTO: 32.8 % (ref 34.8–46.1)
HCT VFR BLD CALC: 30 % (ref 34.8–46.1)
HGB BLD-MCNC: 10 G/DL (ref 11.5–15.4)
HGB BLDA-MCNC: 10.2 G/DL (ref 11.5–15.4)
MCH RBC QN AUTO: 25.8 PG (ref 26.8–34.3)
MCHC RBC AUTO-ENTMCNC: 30.5 G/DL (ref 31.4–37.4)
MCV RBC AUTO: 85 FL (ref 82–98)
PCO2 BLD: 22 MMOL/L (ref 21–32)
PCO2 BLD: 33.6 MM HG (ref 36–44)
PH BLD: 7.41 [PH] (ref 7.35–7.45)
PLATELET # BLD AUTO: 219 THOUSANDS/UL (ref 149–390)
PMV BLD AUTO: 11.6 FL (ref 8.9–12.7)
PO2 BLD: 105 MM HG (ref 75–129)
POTASSIUM BLD-SCNC: 4.5 MMOL/L (ref 3.5–5.3)
POTASSIUM SERPL-SCNC: 4.5 MMOL/L (ref 3.5–5.3)
RBC # BLD AUTO: 3.87 MILLION/UL (ref 3.81–5.12)
SAO2 % BLD FROM PO2: 98 % (ref 60–85)
SODIUM BLD-SCNC: 139 MMOL/L (ref 136–145)
SODIUM SERPL-SCNC: 135 MMOL/L (ref 136–145)
SPECIMEN SOURCE: ABNORMAL
WBC # BLD AUTO: 8.94 THOUSAND/UL (ref 4.31–10.16)

## 2020-10-15 PROCEDURE — 85027 COMPLETE CBC AUTOMATED: CPT | Performed by: UROLOGY

## 2020-10-15 PROCEDURE — 99024 POSTOP FOLLOW-UP VISIT: CPT | Performed by: PHYSICIAN ASSISTANT

## 2020-10-15 PROCEDURE — 90662 IIV NO PRSV INCREASED AG IM: CPT | Performed by: UROLOGY

## 2020-10-15 PROCEDURE — NC001 PR NO CHARGE: Performed by: NURSE PRACTITIONER

## 2020-10-15 PROCEDURE — 80048 BASIC METABOLIC PNL TOTAL CA: CPT | Performed by: UROLOGY

## 2020-10-15 PROCEDURE — G0008 ADMIN INFLUENZA VIRUS VAC: HCPCS | Performed by: UROLOGY

## 2020-10-15 PROCEDURE — 74018 RADEX ABDOMEN 1 VIEW: CPT

## 2020-10-15 RX ORDER — OXYCODONE HYDROCHLORIDE 5 MG/1
5 TABLET ORAL EVERY 6 HOURS PRN
Qty: 40 TABLET | Refills: 0 | Status: SHIPPED | OUTPATIENT
Start: 2020-10-15 | End: 2020-11-12 | Stop reason: HOSPADM

## 2020-10-15 RX ORDER — OXYCODONE HYDROCHLORIDE 5 MG/1
5 TABLET ORAL EVERY 4 HOURS PRN
Qty: 10 TABLET | Refills: 0 | Status: SHIPPED | OUTPATIENT
Start: 2020-10-15 | End: 2020-10-15

## 2020-10-15 RX ADMIN — CEFAZOLIN SODIUM 1000 MG: 1 SOLUTION INTRAVENOUS at 07:58

## 2020-10-15 RX ADMIN — BUPROPION HYDROCHLORIDE 100 MG: 100 TABLET, FILM COATED ORAL at 11:24

## 2020-10-15 RX ADMIN — SODIUM CHLORIDE, SODIUM LACTATE, POTASSIUM CHLORIDE, AND CALCIUM CHLORIDE 125 ML/HR: .6; .31; .03; .02 INJECTION, SOLUTION INTRAVENOUS at 07:56

## 2020-10-15 RX ADMIN — METOPROLOL TARTRATE 25 MG: 25 TABLET, FILM COATED ORAL at 10:28

## 2020-10-15 RX ADMIN — ACETAMINOPHEN 975 MG: 325 TABLET ORAL at 11:24

## 2020-10-15 RX ADMIN — ACETAMINOPHEN 975 MG: 325 TABLET ORAL at 00:26

## 2020-10-15 RX ADMIN — ACETAMINOPHEN 975 MG: 325 TABLET ORAL at 06:43

## 2020-10-15 RX ADMIN — CEFAZOLIN SODIUM 1000 MG: 1 SOLUTION INTRAVENOUS at 15:40

## 2020-10-15 RX ADMIN — METFORMIN HYDROCHLORIDE 500 MG: 500 TABLET ORAL at 07:57

## 2020-10-15 RX ADMIN — MORPHINE SULFATE 2 MG: 2 INJECTION, SOLUTION INTRAMUSCULAR; INTRAVENOUS at 12:52

## 2020-10-15 RX ADMIN — LISINOPRIL 20 MG: 20 TABLET ORAL at 10:28

## 2020-10-15 RX ADMIN — TAMSULOSIN HYDROCHLORIDE 0.4 MG: 0.4 CAPSULE ORAL at 15:43

## 2020-10-15 RX ADMIN — METFORMIN HYDROCHLORIDE 500 MG: 500 TABLET ORAL at 15:43

## 2020-10-15 RX ADMIN — HYDROCHLOROTHIAZIDE 25 MG: 25 TABLET ORAL at 10:29

## 2020-10-15 RX ADMIN — SODIUM CHLORIDE 1000 ML: 0.9 INJECTION, SOLUTION INTRAVENOUS at 16:24

## 2020-10-15 RX ADMIN — ATORVASTATIN CALCIUM 40 MG: 40 TABLET, FILM COATED ORAL at 15:43

## 2020-10-15 RX ADMIN — CHOLECALCIFEROL TAB 10 MCG (400 UNIT) 400 UNITS: 10 TAB at 11:24

## 2020-10-15 RX ADMIN — OXYCODONE HYDROCHLORIDE 5 MG: 5 TABLET ORAL at 14:11

## 2020-10-15 RX ADMIN — OXYCODONE HYDROCHLORIDE 5 MG: 5 TABLET ORAL at 10:35

## 2020-10-15 RX ADMIN — INFLUENZA A VIRUS A/MICHIGAN/45/2015 X-275 (H1N1) ANTIGEN (FORMALDEHYDE INACTIVATED), INFLUENZA A VIRUS A/SINGAPORE/INFIMH-16-0019/2016 IVR-186 (H3N2) ANTIGEN (FORMALDEHYDE INACTIVATED), INFLUENZA B VIRUS B/PHUKET/3073/2013 ANTIGEN (FORMALDEHYDE INACTIVATED), AND INFLUENZA B VIRUS B/MARYLAND/15/2016 BX-69A ANTIGEN (FORMALDEHYDE INACTIVATED) 0.7 ML: 60; 60; 60; 60 INJECTION, SUSPENSION INTRAMUSCULAR at 08:00

## 2020-10-22 LAB
COLOR STONE: NORMAL
COM MFR STONE: 100 %
COMMENT-STONE3: NORMAL
COMPOSITION: NORMAL
LABORATORY COMMENT REPORT: NORMAL
PHOTO: NORMAL
SIZE STONE: NORMAL MM
SPEC SOURCE SUBJ: NORMAL
STONE ANALYSIS-IMP: NORMAL
WT STONE: 3722 MG

## 2020-10-29 ENCOUNTER — TELEMEDICINE (OUTPATIENT)
Dept: RADIATION ONCOLOGY | Facility: CLINIC | Age: 65
End: 2020-10-29
Attending: RADIOLOGY

## 2020-10-29 ENCOUNTER — DOCUMENTATION (OUTPATIENT)
Dept: RADIATION ONCOLOGY | Facility: CLINIC | Age: 65
End: 2020-10-29

## 2020-10-29 DIAGNOSIS — C54.1 ENDOMETRIAL CANCER, GRADE I (HCC): Primary | ICD-10-CM

## 2020-11-04 ENCOUNTER — HOSPITAL ENCOUNTER (OUTPATIENT)
Dept: RADIOLOGY | Facility: HOSPITAL | Age: 65
Discharge: HOME | End: 2020-11-04
Payer: COMMERCIAL

## 2020-11-04 ENCOUNTER — OFFICE VISIT (OUTPATIENT)
Dept: LAB | Facility: HOSPITAL | Age: 65
End: 2020-11-04
Payer: COMMERCIAL

## 2020-11-04 ENCOUNTER — LAB (OUTPATIENT)
Dept: LAB | Facility: HOSPITAL | Age: 65
End: 2020-11-04
Payer: COMMERCIAL

## 2020-11-04 DIAGNOSIS — M25.562 CHRONIC PAIN OF LEFT KNEE: ICD-10-CM

## 2020-11-04 DIAGNOSIS — R93.89 THICKENED ENDOMETRIUM: ICD-10-CM

## 2020-11-04 DIAGNOSIS — G89.29 CHRONIC PAIN OF LEFT KNEE: ICD-10-CM

## 2020-11-04 DIAGNOSIS — N95.0 POSTMENOPAUSAL BLEEDING: ICD-10-CM

## 2020-11-04 DIAGNOSIS — N20.1 CALCULUS OF URETER: ICD-10-CM

## 2020-11-04 DIAGNOSIS — E11.9 TYPE 2 DIABETES MELLITUS WITHOUT COMPLICATION, WITHOUT LONG-TERM CURRENT USE OF INSULIN (HCC): ICD-10-CM

## 2020-11-04 LAB
ANION GAP SERPL CALCULATED.3IONS-SCNC: 8 MMOL/L (ref 5–14)
BACTERIA UR QL AUTO: ABNORMAL /HPF
BILIRUB UR QL STRIP: NEGATIVE
BUN SERPL-MCNC: 15 MG/DL (ref 5–25)
CALCIUM SERPL-MCNC: 9.8 MG/DL (ref 8.4–10.2)
CHLORIDE SERPL-SCNC: 106 MMOL/L (ref 97–108)
CHOLEST SERPL-MCNC: 221 MG/DL
CLARITY UR: ABNORMAL
CO2 SERPL-SCNC: 27 MMOL/L (ref 22–30)
COLOR UR: YELLOW
CREAT SERPL-MCNC: 0.96 MG/DL (ref 0.6–1.2)
EST. AVERAGE GLUCOSE BLD GHB EST-MCNC: 134 MG/DL
GFR SERPL CREATININE-BSD FRML MDRD: 72 ML/MIN/1.73SQ M
GLUCOSE P FAST SERPL-MCNC: 131 MG/DL (ref 70–99)
GLUCOSE UR STRIP-MCNC: NEGATIVE MG/DL
HBA1C MFR BLD: 6.3 %
HDLC SERPL-MCNC: 35 MG/DL
HGB UR QL STRIP.AUTO: 50
KETONES UR STRIP-MCNC: NEGATIVE MG/DL
LDLC SERPL CALC-MCNC: 153 MG/DL
LEUKOCYTE ESTERASE UR QL STRIP: 500
MUCOUS THREADS UR QL AUTO: ABNORMAL
NITRITE UR QL STRIP: NEGATIVE
NON-SQ EPI CELLS URNS QL MICRO: ABNORMAL /HPF
NONHDLC SERPL-MCNC: 186 MG/DL
PH UR STRIP.AUTO: 6 [PH]
POTASSIUM SERPL-SCNC: 4.2 MMOL/L (ref 3.6–5)
PROT UR STRIP-MCNC: ABNORMAL MG/DL
RBC #/AREA URNS AUTO: ABNORMAL /HPF
SODIUM SERPL-SCNC: 141 MMOL/L (ref 137–147)
SP GR UR STRIP.AUTO: 1.02 (ref 1–1.04)
TRIGL SERPL-MCNC: 166 MG/DL
UROBILINOGEN UA: NEGATIVE MG/DL
WBC #/AREA URNS AUTO: ABNORMAL /HPF

## 2020-11-04 PROCEDURE — 80048 BASIC METABOLIC PNL TOTAL CA: CPT

## 2020-11-04 PROCEDURE — 87086 URINE CULTURE/COLONY COUNT: CPT | Performed by: STUDENT IN AN ORGANIZED HEALTH CARE EDUCATION/TRAINING PROGRAM

## 2020-11-04 PROCEDURE — 83036 HEMOGLOBIN GLYCOSYLATED A1C: CPT

## 2020-11-04 PROCEDURE — 36415 COLL VENOUS BLD VENIPUNCTURE: CPT

## 2020-11-04 PROCEDURE — 81001 URINALYSIS AUTO W/SCOPE: CPT | Performed by: STUDENT IN AN ORGANIZED HEALTH CARE EDUCATION/TRAINING PROGRAM

## 2020-11-04 PROCEDURE — 81003 URINALYSIS AUTO W/O SCOPE: CPT | Performed by: STUDENT IN AN ORGANIZED HEALTH CARE EDUCATION/TRAINING PROGRAM

## 2020-11-04 PROCEDURE — 3044F HG A1C LEVEL LT 7.0%: CPT | Performed by: FAMILY MEDICINE

## 2020-11-04 PROCEDURE — 80061 LIPID PANEL: CPT

## 2020-11-04 PROCEDURE — 93005 ELECTROCARDIOGRAM TRACING: CPT

## 2020-11-04 PROCEDURE — 73562 X-RAY EXAM OF KNEE 3: CPT

## 2020-11-05 LAB
ATRIAL RATE: 68 BPM
BACTERIA UR CULT: NORMAL
P AXIS: 56 DEGREES
PR INTERVAL: 156 MS
QRS AXIS: 13 DEGREES
QRSD INTERVAL: 88 MS
QT INTERVAL: 430 MS
QTC INTERVAL: 457 MS
T WAVE AXIS: 40 DEGREES
VENTRICULAR RATE: 68 BPM

## 2020-11-05 PROCEDURE — 93010 ELECTROCARDIOGRAM REPORT: CPT | Performed by: INTERNAL MEDICINE

## 2020-11-06 ENCOUNTER — OFFICE VISIT (OUTPATIENT)
Dept: FAMILY MEDICINE CLINIC | Facility: CLINIC | Age: 65
End: 2020-11-06

## 2020-11-06 VITALS
RESPIRATION RATE: 18 BRPM | OXYGEN SATURATION: 98 % | DIASTOLIC BLOOD PRESSURE: 82 MMHG | SYSTOLIC BLOOD PRESSURE: 146 MMHG | HEART RATE: 84 BPM | BODY MASS INDEX: 36.39 KG/M2 | WEIGHT: 212 LBS | TEMPERATURE: 97.9 F

## 2020-11-06 DIAGNOSIS — I10 ESSENTIAL HYPERTENSION: Chronic | ICD-10-CM

## 2020-11-06 DIAGNOSIS — N30.00 ACUTE CYSTITIS WITHOUT HEMATURIA: Primary | ICD-10-CM

## 2020-11-06 DIAGNOSIS — E11.9 TYPE 2 DIABETES MELLITUS WITHOUT COMPLICATION, WITHOUT LONG-TERM CURRENT USE OF INSULIN (HCC): ICD-10-CM

## 2020-11-06 DIAGNOSIS — M25.562 LEFT KNEE PAIN, UNSPECIFIED CHRONICITY: ICD-10-CM

## 2020-11-06 DIAGNOSIS — E78.5 HYPERLIPIDEMIA, UNSPECIFIED HYPERLIPIDEMIA TYPE: ICD-10-CM

## 2020-11-06 PROCEDURE — 1036F TOBACCO NON-USER: CPT | Performed by: FAMILY MEDICINE

## 2020-11-06 PROCEDURE — 99213 OFFICE O/P EST LOW 20 MIN: CPT | Performed by: FAMILY MEDICINE

## 2020-11-06 RX ORDER — CEPHALEXIN 500 MG/1
500 CAPSULE ORAL EVERY 12 HOURS SCHEDULED
Qty: 10 CAPSULE | Refills: 0 | Status: SHIPPED | OUTPATIENT
Start: 2020-11-06 | End: 2020-11-12 | Stop reason: HOSPADM

## 2020-11-06 RX ORDER — HYDROCHLOROTHIAZIDE 25 MG/1
12.5 TABLET ORAL DAILY
Qty: 30 TABLET | Refills: 1 | Status: SHIPPED | OUTPATIENT
Start: 2020-11-06 | End: 2021-03-09

## 2020-11-06 RX ORDER — PHENAZOPYRIDINE HYDROCHLORIDE 200 MG/1
200 TABLET, FILM COATED ORAL
Qty: 10 TABLET | Refills: 0 | Status: SHIPPED | OUTPATIENT
Start: 2020-11-06 | End: 2020-11-12 | Stop reason: HOSPADM

## 2020-11-06 RX ORDER — ATORVASTATIN CALCIUM 80 MG/1
80 TABLET, FILM COATED ORAL DAILY
Qty: 30 TABLET | Refills: 3 | Status: SHIPPED | OUTPATIENT
Start: 2020-11-06 | End: 2021-03-02

## 2020-11-06 RX ORDER — BLOOD SUGAR DIAGNOSTIC
STRIP MISCELLANEOUS
Qty: 100 EACH | Refills: 2 | Status: SHIPPED | OUTPATIENT
Start: 2020-11-06 | End: 2021-02-17 | Stop reason: SDUPTHER

## 2020-11-10 PROCEDURE — NC001 PR NO CHARGE: Performed by: UROLOGY

## 2020-11-11 ENCOUNTER — ANESTHESIA EVENT (OUTPATIENT)
Dept: PERIOP | Facility: HOSPITAL | Age: 65
End: 2020-11-11
Payer: COMMERCIAL

## 2020-11-11 RX ORDER — GENTAMICIN SULFATE 80 MG/50ML
1.5 INJECTION, SOLUTION INTRAVENOUS ONCE
Status: DISCONTINUED | OUTPATIENT
Start: 2020-11-11 | End: 2020-11-11

## 2020-11-12 ENCOUNTER — APPOINTMENT (OUTPATIENT)
Dept: RADIOLOGY | Facility: HOSPITAL | Age: 65
End: 2020-11-12
Payer: COMMERCIAL

## 2020-11-12 ENCOUNTER — TELEPHONE (OUTPATIENT)
Dept: UROLOGY | Facility: MEDICAL CENTER | Age: 65
End: 2020-11-12

## 2020-11-12 ENCOUNTER — HOSPITAL ENCOUNTER (OUTPATIENT)
Facility: HOSPITAL | Age: 65
Setting detail: OUTPATIENT SURGERY
Discharge: HOME | End: 2020-11-12
Attending: UROLOGY | Admitting: UROLOGY
Payer: COMMERCIAL

## 2020-11-12 ENCOUNTER — ANESTHESIA (OUTPATIENT)
Dept: PERIOP | Facility: HOSPITAL | Age: 65
End: 2020-11-12
Payer: COMMERCIAL

## 2020-11-12 VITALS
RESPIRATION RATE: 18 BRPM | OXYGEN SATURATION: 93 % | TEMPERATURE: 97.5 F | SYSTOLIC BLOOD PRESSURE: 145 MMHG | DIASTOLIC BLOOD PRESSURE: 80 MMHG | HEART RATE: 78 BPM

## 2020-11-12 VITALS — HEART RATE: 89 BPM

## 2020-11-12 DIAGNOSIS — N20.0 NEPHROLITHIASIS: Primary | ICD-10-CM

## 2020-11-12 DIAGNOSIS — N30.00 ACUTE CYSTITIS WITHOUT HEMATURIA: ICD-10-CM

## 2020-11-12 DIAGNOSIS — J45.20 MILD INTERMITTENT ASTHMA, UNSPECIFIED WHETHER COMPLICATED: ICD-10-CM

## 2020-11-12 DIAGNOSIS — N20.0 NEPHROLITHIASIS: ICD-10-CM

## 2020-11-12 LAB
GLUCOSE SERPL-MCNC: 116 MG/DL (ref 65–140)
GLUCOSE SERPL-MCNC: 153 MG/DL (ref 65–140)

## 2020-11-12 PROCEDURE — 52356 CYSTO/URETERO W/LITHOTRIPSY: CPT | Performed by: UROLOGY

## 2020-11-12 PROCEDURE — 82948 REAGENT STRIP/BLOOD GLUCOSE: CPT

## 2020-11-12 PROCEDURE — 74420 UROGRAPHY RTRGR +-KUB: CPT

## 2020-11-12 PROCEDURE — C1758 CATHETER, URETERAL: HCPCS | Performed by: UROLOGY

## 2020-11-12 PROCEDURE — NC001 PR NO CHARGE: Performed by: UROLOGY

## 2020-11-12 PROCEDURE — C1769 GUIDE WIRE: HCPCS | Performed by: UROLOGY

## 2020-11-12 PROCEDURE — C2617 STENT, NON-COR, TEM W/O DEL: HCPCS | Performed by: UROLOGY

## 2020-11-12 DEVICE — VARIABLE LENGTH INJECTION STENT SET
Type: IMPLANTABLE DEVICE | Site: URETER | Status: FUNCTIONAL
Brand: CONTOUR VL™ INJECTION STENT SET

## 2020-11-12 RX ORDER — HYDROMORPHONE HCL/PF 1 MG/ML
0.5 SYRINGE (ML) INJECTION
Status: DISCONTINUED | OUTPATIENT
Start: 2020-11-12 | End: 2020-11-12 | Stop reason: HOSPADM

## 2020-11-12 RX ORDER — PHENAZOPYRIDINE HYDROCHLORIDE 200 MG/1
200 TABLET, FILM COATED ORAL 3 TIMES DAILY PRN
Qty: 10 TABLET | Refills: 0 | Status: SHIPPED | OUTPATIENT
Start: 2020-11-12

## 2020-11-12 RX ORDER — LIDOCAINE HYDROCHLORIDE 10 MG/ML
INJECTION, SOLUTION EPIDURAL; INFILTRATION; INTRACAUDAL; PERINEURAL AS NEEDED
Status: DISCONTINUED | OUTPATIENT
Start: 2020-11-12 | End: 2020-11-12

## 2020-11-12 RX ORDER — SODIUM CHLORIDE, SODIUM LACTATE, POTASSIUM CHLORIDE, CALCIUM CHLORIDE 600; 310; 30; 20 MG/100ML; MG/100ML; MG/100ML; MG/100ML
50 INJECTION, SOLUTION INTRAVENOUS CONTINUOUS
Status: DISCONTINUED | OUTPATIENT
Start: 2020-11-12 | End: 2020-11-12 | Stop reason: HOSPADM

## 2020-11-12 RX ORDER — MAGNESIUM HYDROXIDE 1200 MG/15ML
LIQUID ORAL AS NEEDED
Status: DISCONTINUED | OUTPATIENT
Start: 2020-11-12 | End: 2020-11-12 | Stop reason: HOSPADM

## 2020-11-12 RX ORDER — SUCCINYLCHOLINE/SOD CL,ISO/PF 100 MG/5ML
SYRINGE (ML) INTRAVENOUS AS NEEDED
Status: DISCONTINUED | OUTPATIENT
Start: 2020-11-12 | End: 2020-11-12

## 2020-11-12 RX ORDER — CEPHALEXIN 500 MG/1
500 CAPSULE ORAL EVERY 12 HOURS SCHEDULED
Qty: 10 CAPSULE | Refills: 0 | Status: SHIPPED | OUTPATIENT
Start: 2020-11-12 | End: 2020-11-17

## 2020-11-12 RX ORDER — PHENAZOPYRIDINE HYDROCHLORIDE 200 MG/1
200 TABLET, FILM COATED ORAL 3 TIMES DAILY PRN
Qty: 10 TABLET | Refills: 0
Start: 2020-11-12 | End: 2020-11-12 | Stop reason: SDUPTHER

## 2020-11-12 RX ORDER — OXYCODONE HYDROCHLORIDE 5 MG/1
5 TABLET ORAL EVERY 4 HOURS PRN
Status: DISCONTINUED | OUTPATIENT
Start: 2020-11-12 | End: 2020-11-12 | Stop reason: HOSPADM

## 2020-11-12 RX ORDER — DEXAMETHASONE SODIUM PHOSPHATE 10 MG/ML
INJECTION, SOLUTION INTRAMUSCULAR; INTRAVENOUS AS NEEDED
Status: DISCONTINUED | OUTPATIENT
Start: 2020-11-12 | End: 2020-11-12

## 2020-11-12 RX ORDER — TAMSULOSIN HYDROCHLORIDE 0.4 MG/1
0.4 CAPSULE ORAL EVERY EVENING
Qty: 30 CAPSULE | Refills: 0 | Status: SHIPPED | OUTPATIENT
Start: 2020-11-12

## 2020-11-12 RX ORDER — CEFAZOLIN SODIUM 2 G/50ML
2000 SOLUTION INTRAVENOUS ONCE
Status: COMPLETED | OUTPATIENT
Start: 2020-11-12 | End: 2020-11-12

## 2020-11-12 RX ORDER — PHENAZOPYRIDINE HYDROCHLORIDE 100 MG/1
200 TABLET, FILM COATED ORAL ONCE AS NEEDED
Status: COMPLETED | OUTPATIENT
Start: 2020-11-12 | End: 2020-11-12

## 2020-11-12 RX ORDER — OXYCODONE HYDROCHLORIDE 5 MG/1
TABLET ORAL
Qty: 8 TABLET | Refills: 0 | Status: SHIPPED | OUTPATIENT
Start: 2020-11-12

## 2020-11-12 RX ORDER — ACETAMINOPHEN 325 MG/1
650 TABLET ORAL EVERY 6 HOURS PRN
Status: DISCONTINUED | OUTPATIENT
Start: 2020-11-12 | End: 2020-11-12 | Stop reason: HOSPADM

## 2020-11-12 RX ORDER — ONDANSETRON 2 MG/ML
4 INJECTION INTRAMUSCULAR; INTRAVENOUS ONCE AS NEEDED
Status: DISCONTINUED | OUTPATIENT
Start: 2020-11-12 | End: 2020-11-12 | Stop reason: HOSPADM

## 2020-11-12 RX ORDER — PROPOFOL 10 MG/ML
INJECTION, EMULSION INTRAVENOUS AS NEEDED
Status: DISCONTINUED | OUTPATIENT
Start: 2020-11-12 | End: 2020-11-12

## 2020-11-12 RX ORDER — ONDANSETRON 2 MG/ML
INJECTION INTRAMUSCULAR; INTRAVENOUS AS NEEDED
Status: DISCONTINUED | OUTPATIENT
Start: 2020-11-12 | End: 2020-11-12

## 2020-11-12 RX ORDER — ONDANSETRON 2 MG/ML
4 INJECTION INTRAMUSCULAR; INTRAVENOUS EVERY 6 HOURS PRN
Status: DISCONTINUED | OUTPATIENT
Start: 2020-11-12 | End: 2020-11-12 | Stop reason: HOSPADM

## 2020-11-12 RX ORDER — GENTAMICIN SULFATE 40 MG/ML
INJECTION, SOLUTION INTRAMUSCULAR; INTRAVENOUS AS NEEDED
Status: DISCONTINUED | OUTPATIENT
Start: 2020-11-12 | End: 2020-11-12

## 2020-11-12 RX ORDER — FENTANYL CITRATE 50 UG/ML
INJECTION, SOLUTION INTRAMUSCULAR; INTRAVENOUS AS NEEDED
Status: DISCONTINUED | OUTPATIENT
Start: 2020-11-12 | End: 2020-11-12

## 2020-11-12 RX ORDER — KETOROLAC TROMETHAMINE 30 MG/ML
15 INJECTION, SOLUTION INTRAMUSCULAR; INTRAVENOUS EVERY 6 HOURS SCHEDULED
Status: DISCONTINUED | OUTPATIENT
Start: 2020-11-12 | End: 2020-11-12 | Stop reason: HOSPADM

## 2020-11-12 RX ADMIN — IOHEXOL 50 ML: 240 INJECTION, SOLUTION INTRATHECAL; INTRAVASCULAR; INTRAVENOUS; ORAL at 17:30

## 2020-11-12 RX ADMIN — GENTAMICIN SULFATE 80 MG: 40 INJECTION, SOLUTION INTRAMUSCULAR; INTRAVENOUS at 16:38

## 2020-11-12 RX ADMIN — ONDANSETRON HYDROCHLORIDE 4 MG: 2 INJECTION, SOLUTION INTRAMUSCULAR; INTRAVENOUS at 17:54

## 2020-11-12 RX ADMIN — KETOROLAC TROMETHAMINE 15 MG: 30 INJECTION, SOLUTION INTRAMUSCULAR at 18:45

## 2020-11-12 RX ADMIN — CEFAZOLIN SODIUM 2000 MG: 2 SOLUTION INTRAVENOUS at 16:52

## 2020-11-12 RX ADMIN — PHENAZOPYRIDINE HYDROCHLORIDE 200 MG: 100 TABLET ORAL at 18:45

## 2020-11-12 RX ADMIN — Medication 100 MG: at 16:49

## 2020-11-12 RX ADMIN — LIDOCAINE HYDROCHLORIDE 50 MG: 10 INJECTION, SOLUTION EPIDURAL; INFILTRATION; INTRACAUDAL; PERINEURAL at 16:48

## 2020-11-12 RX ADMIN — SODIUM CHLORIDE, SODIUM LACTATE, POTASSIUM CHLORIDE, AND CALCIUM CHLORIDE: .6; .31; .03; .02 INJECTION, SOLUTION INTRAVENOUS at 16:37

## 2020-11-12 RX ADMIN — FENTANYL CITRATE 50 MCG: 50 INJECTION INTRAMUSCULAR; INTRAVENOUS at 16:58

## 2020-11-12 RX ADMIN — DEXAMETHASONE SODIUM PHOSPHATE 4 MG: 10 INJECTION, SOLUTION INTRAMUSCULAR; INTRAVENOUS at 16:54

## 2020-11-12 RX ADMIN — PROPOFOL 160 MG: 10 INJECTION, EMULSION INTRAVENOUS at 16:48

## 2020-11-12 RX ADMIN — FENTANYL CITRATE 50 MCG: 50 INJECTION INTRAMUSCULAR; INTRAVENOUS at 16:48

## 2020-11-13 RX ORDER — ALBUTEROL SULFATE 90 UG/1
2 AEROSOL, METERED RESPIRATORY (INHALATION) EVERY 6 HOURS PRN
Qty: 1 INHALER | Refills: 4 | Status: SHIPPED | OUTPATIENT
Start: 2020-11-13 | End: 2021-03-24

## 2020-11-20 ENCOUNTER — PROCEDURE VISIT (OUTPATIENT)
Dept: UROLOGY | Facility: MEDICAL CENTER | Age: 65
End: 2020-11-20

## 2020-11-20 VITALS
SYSTOLIC BLOOD PRESSURE: 138 MMHG | BODY MASS INDEX: 36.02 KG/M2 | HEIGHT: 64 IN | DIASTOLIC BLOOD PRESSURE: 80 MMHG | WEIGHT: 211 LBS | HEART RATE: 105 BPM

## 2020-11-20 DIAGNOSIS — N20.0 NEPHROLITHIASIS: Primary | ICD-10-CM

## 2020-11-20 PROCEDURE — 99024 POSTOP FOLLOW-UP VISIT: CPT

## 2020-11-20 PROCEDURE — 3008F BODY MASS INDEX DOCD: CPT | Performed by: FAMILY MEDICINE

## 2020-11-30 PROBLEM — M17.12 OSTEOARTHRITIS OF LEFT KNEE: Status: ACTIVE | Noted: 2020-07-28

## 2020-12-02 DIAGNOSIS — E11.9 TYPE 2 DIABETES MELLITUS WITHOUT COMPLICATION, WITHOUT LONG-TERM CURRENT USE OF INSULIN (HCC): ICD-10-CM

## 2020-12-02 RX ORDER — LANCETS 28 GAUGE
EACH MISCELLANEOUS
Qty: 100 EACH | Refills: 0 | Status: SHIPPED | OUTPATIENT
Start: 2020-12-02 | End: 2021-03-30

## 2020-12-15 DIAGNOSIS — N20.0 NEPHROLITHIASIS: ICD-10-CM

## 2020-12-15 RX ORDER — TAMSULOSIN HYDROCHLORIDE 0.4 MG/1
CAPSULE ORAL
Qty: 30 CAPSULE | Refills: 0 | OUTPATIENT
Start: 2020-12-15

## 2020-12-24 DIAGNOSIS — G89.29 CHRONIC PAIN OF LEFT KNEE: ICD-10-CM

## 2020-12-24 DIAGNOSIS — M25.562 CHRONIC PAIN OF LEFT KNEE: ICD-10-CM

## 2020-12-28 RX ORDER — ACETAMINOPHEN 650 MG/1
TABLET, FILM COATED, EXTENDED RELEASE ORAL
Qty: 90 TABLET | Refills: 1 | Status: SHIPPED | OUTPATIENT
Start: 2020-12-28 | End: 2021-03-19

## 2021-01-08 DIAGNOSIS — K21.9 GASTROESOPHAGEAL REFLUX DISEASE WITHOUT ESOPHAGITIS: ICD-10-CM

## 2021-01-08 DIAGNOSIS — R13.19 ESOPHAGEAL DYSPHAGIA: ICD-10-CM

## 2021-01-10 RX ORDER — OMEPRAZOLE 20 MG/1
CAPSULE, DELAYED RELEASE ORAL
Qty: 60 CAPSULE | Refills: 2 | Status: SHIPPED | OUTPATIENT
Start: 2021-01-10 | End: 2021-02-26

## 2021-02-15 DIAGNOSIS — E11.9 TYPE 2 DIABETES MELLITUS WITHOUT COMPLICATION, WITHOUT LONG-TERM CURRENT USE OF INSULIN (HCC): ICD-10-CM

## 2021-02-17 RX ORDER — BLOOD-GLUCOSE METER
KIT MISCELLANEOUS
Qty: 90 EACH | Refills: 1 | Status: SHIPPED | OUTPATIENT
Start: 2021-02-17 | End: 2021-04-14

## 2021-02-26 ENCOUNTER — TELEPHONE (OUTPATIENT)
Dept: FAMILY MEDICINE CLINIC | Facility: CLINIC | Age: 66
End: 2021-02-26

## 2021-02-26 DIAGNOSIS — R13.19 ESOPHAGEAL DYSPHAGIA: ICD-10-CM

## 2021-02-26 DIAGNOSIS — K21.9 GASTROESOPHAGEAL REFLUX DISEASE WITHOUT ESOPHAGITIS: ICD-10-CM

## 2021-02-26 RX ORDER — OMEPRAZOLE 40 MG/1
40 CAPSULE, DELAYED RELEASE ORAL DAILY
Qty: 30 CAPSULE | Refills: 3 | Status: SHIPPED | OUTPATIENT
Start: 2021-02-26

## 2021-03-02 DIAGNOSIS — E78.5 HYPERLIPIDEMIA, UNSPECIFIED HYPERLIPIDEMIA TYPE: ICD-10-CM

## 2021-03-02 DIAGNOSIS — E11.9 TYPE 2 DIABETES MELLITUS WITHOUT COMPLICATION, WITHOUT LONG-TERM CURRENT USE OF INSULIN (HCC): ICD-10-CM

## 2021-03-02 RX ORDER — ATORVASTATIN CALCIUM 80 MG/1
TABLET, FILM COATED ORAL
Qty: 30 TABLET | Refills: 2 | Status: SHIPPED | OUTPATIENT
Start: 2021-03-02

## 2021-03-09 DIAGNOSIS — I10 ESSENTIAL HYPERTENSION: Chronic | ICD-10-CM

## 2021-03-09 RX ORDER — HYDROCHLOROTHIAZIDE 25 MG/1
TABLET ORAL
Qty: 30 TABLET | Refills: 0 | Status: SHIPPED | OUTPATIENT
Start: 2021-03-09

## 2021-03-10 DIAGNOSIS — G89.29 CHRONIC PAIN OF LEFT KNEE: ICD-10-CM

## 2021-03-10 DIAGNOSIS — M25.562 CHRONIC PAIN OF LEFT KNEE: ICD-10-CM

## 2021-03-19 RX ORDER — ACETAMINOPHEN 650 MG/1
TABLET, FILM COATED, EXTENDED RELEASE ORAL
Qty: 90 TABLET | Refills: 0 | Status: SHIPPED | OUTPATIENT
Start: 2021-03-19 | End: 2021-04-29 | Stop reason: SDUPTHER

## 2021-03-23 DIAGNOSIS — J45.20 MILD INTERMITTENT ASTHMA, UNSPECIFIED WHETHER COMPLICATED: ICD-10-CM

## 2021-03-24 RX ORDER — ALBUTEROL SULFATE 90 UG/1
AEROSOL, METERED RESPIRATORY (INHALATION)
Qty: 18 G | Refills: 3 | Status: SHIPPED | OUTPATIENT
Start: 2021-03-24

## 2021-03-30 DIAGNOSIS — E11.9 TYPE 2 DIABETES MELLITUS WITHOUT COMPLICATION, WITHOUT LONG-TERM CURRENT USE OF INSULIN (HCC): ICD-10-CM

## 2021-03-30 RX ORDER — BLOOD-GLUCOSE METER
EACH MISCELLANEOUS
Qty: 100 EACH | Refills: 0 | Status: SHIPPED | OUTPATIENT
Start: 2021-03-30

## 2021-04-13 DIAGNOSIS — E11.9 TYPE 2 DIABETES MELLITUS WITHOUT COMPLICATION, WITHOUT LONG-TERM CURRENT USE OF INSULIN (HCC): ICD-10-CM

## 2021-04-14 RX ORDER — BLOOD SUGAR DIAGNOSTIC
STRIP MISCELLANEOUS
Qty: 100 EACH | Refills: 2 | Status: SHIPPED | OUTPATIENT
Start: 2021-04-14

## 2021-04-28 ENCOUNTER — TELEPHONE (OUTPATIENT)
Dept: RADIATION ONCOLOGY | Facility: CLINIC | Age: 66
End: 2021-04-28

## 2021-04-29 DIAGNOSIS — M25.562 CHRONIC PAIN OF LEFT KNEE: ICD-10-CM

## 2021-04-29 DIAGNOSIS — G89.29 CHRONIC PAIN OF LEFT KNEE: ICD-10-CM

## 2021-04-30 RX ORDER — SENNOSIDES 8.6 MG
650 CAPSULE ORAL EVERY 8 HOURS PRN
Qty: 90 TABLET | Refills: 0 | Status: SHIPPED | OUTPATIENT
Start: 2021-04-30 | End: 2021-05-30

## 2021-05-13 DIAGNOSIS — M25.562 CHRONIC PAIN OF LEFT KNEE: ICD-10-CM

## 2021-05-13 DIAGNOSIS — G89.29 CHRONIC PAIN OF LEFT KNEE: ICD-10-CM

## 2021-05-17 RX ORDER — SENNOSIDES 8.6 MG
650 CAPSULE ORAL EVERY 8 HOURS PRN
Qty: 90 TABLET | Refills: 0 | OUTPATIENT
Start: 2021-05-17 | End: 2021-06-16

## (undated) DEVICE — BAG URINE DRAINAGE 2000ML ANTI RFLX LF

## (undated) DEVICE — SUT MONOCRYL 4-0 SH 27 IN Y415H

## (undated) DEVICE — PROGRASP FORCEPS: Brand: ENDOWRIST

## (undated) DEVICE — TRAY FOLEY 18FR URIMETER SURESTEP

## (undated) DEVICE — NEEDLE COUNTER LG W/RULER

## (undated) DEVICE — URETERAL CATHETER POLLACK OPEN ENDED 5FR

## (undated) DEVICE — CATH BAL DIL NEP 10MM 15CM X-FRC N30 WO INFL DEV

## (undated) DEVICE — GLOVE PI ULTRA TOUCH SZ.6.5

## (undated) DEVICE — BETHLEHEM UNIVERSAL MINOR GEN: Brand: CARDINAL HEALTH

## (undated) DEVICE — ARTHROSCOPY FLOOR MAT

## (undated) DEVICE — SHEATH URETERAL ACCESS 12/14FR 35CM PROXIS

## (undated) DEVICE — MAYO STAND COVER: Brand: CONVERTORS

## (undated) DEVICE — GUIDEWIRE STRGHT TIP 0.035 IN  SOLO PLUS

## (undated) DEVICE — ASTOUND STANDARD SURGICAL GOWN, XL: Brand: CONVERTORS

## (undated) DEVICE — COLUMN DRAPE

## (undated) DEVICE — SUT ETHILON 3-0 FS-1 18 IN 663G

## (undated) DEVICE — UNDERGLOVE PROTEXIS  BLUE SZ 7

## (undated) DEVICE — SPECIMEN CONTAINER STERILE PEEL PACK

## (undated) DEVICE — PREMIUM DRY TRAY LF: Brand: MEDLINE INDUSTRIES, INC.

## (undated) DEVICE — SUT MONOCRYL 4-0 PS-2 27 IN Y426H

## (undated) DEVICE — NEEDLE 25G X 1 1/2

## (undated) DEVICE — INTENDED FOR TISSUE SEPARATION, AND OTHER PROCEDURES THAT REQUIRE A SHARP SURGICAL BLADE TO PUNCTURE OR CUT.: Brand: BARD-PARKER SAFETY BLADES SIZE 11, STERILE

## (undated) DEVICE — KIT, BETHLEHEM ROBOTIC PROST: Brand: CARDINAL HEALTH

## (undated) DEVICE — GLOVE INDICATOR PI UNDERGLOVE SZ 9 BLUE

## (undated) DEVICE — ENDOSCOPIC VALVE WITH ADAPTER.: Brand: SURSEAL® II

## (undated) DEVICE — FIBER LASER HOLIUM 1000 MICRON

## (undated) DEVICE — SYRINGE 20ML LL

## (undated) DEVICE — SUT ETHILON 2-0 FS 18 IN 664H

## (undated) DEVICE — SPECIMEN TRAP: Brand: ARGYLE

## (undated) DEVICE — ADHESIVE SKIN HIGH VISCOSITY EXOFIN 1ML

## (undated) DEVICE — PROBE KIT 3.9 X 350MM  SWISS LITHOCLAST TRILOGY

## (undated) DEVICE — 4-PORT MANIFOLD: Brand: NEPTUNE 2

## (undated) DEVICE — SYRINGE 50ML LL

## (undated) DEVICE — BASKET SPECIMEN RETRIVAL 1.9FR 120CM

## (undated) DEVICE — STERILE POLYISOPRENE POWDER-FREE SURGICAL GLOVES: Brand: PROTEXIS

## (undated) DEVICE — TUBING SUCTION 5MM X 12 FT

## (undated) DEVICE — CHLORAPREP HI-LITE 26ML ORANGE

## (undated) DEVICE — STERILE POLYISOPRENE POWDER-FREE SURGICAL GLOVES WITH EMOLLIENT COATING: Brand: PROTEXIS

## (undated) DEVICE — STANDARD SURGICAL GOWN, L: Brand: CONVERTORS

## (undated) DEVICE — 3M™ TEGADERM™ TRANSPARENT FILM DRESSING FRAME STYLE, 1624W, 2-3/8 IN X 2-3/4 IN (6 CM X 7 CM), 100/CT 4CT/CASE: Brand: 3M™ TEGADERM™

## (undated) DEVICE — SURGICEL 4 X 8

## (undated) DEVICE — GUIDEWIRE AMPLATZ SS .038 180CM

## (undated) DEVICE — CATH URET .038 10FR 50CM DUAL LUMEN

## (undated) DEVICE — PROBE KIT 3.9 X 440MM  SWISS LITHOCLAST TRILOGY

## (undated) DEVICE — SYRINGE 10ML LL CONTROL TOP

## (undated) DEVICE — TROCAR: Brand: KII FIOS FIRST ENTRY

## (undated) DEVICE — TIP COVER ACCESSORY

## (undated) DEVICE — LUBRICANT SURGILUBE TUBE 4 OZ  FLIP TOP

## (undated) DEVICE — UNDER BUTTOCKS DRAPE W/FLUID CONTROL POUCH: Brand: CONVERTORS

## (undated) DEVICE — CYSTO TUBING TUR Y IRRIGATION

## (undated) DEVICE — UTERINE MANIPULATOR RUMI DISP TIP 6.7X12CM ORANGE

## (undated) DEVICE — INFLATION DEVICE BASIX 30ATM

## (undated) DEVICE — MONOPOLAR CURVED SCISSORS: Brand: ENDOWRIST

## (undated) DEVICE — INVIEW CLEAR LEGGINGS: Brand: CONVERTORS

## (undated) DEVICE — SCD SEQUENTIAL COMPRESSION COMFORT SLEEVE MEDIUM KNEE LENGTH: Brand: KENDALL SCD

## (undated) DEVICE — UROCATCH BAG

## (undated) DEVICE — TRAY FOLEY 16FR URIMETER SILICONE SURESTEP

## (undated) DEVICE — ABDOMINAL PAD: Brand: DERMACEA

## (undated) DEVICE — SUT VICRYL 0 CT-1 36 IN J946H

## (undated) DEVICE — TUBING EXTENSION 6 FT CONTIN WAVE

## (undated) DEVICE — GLOVE PI ULTRA TOUCH SZ.7.5

## (undated) DEVICE — 2000CC GUARDIAN II: Brand: GUARDIAN

## (undated) DEVICE — 3000CC GUARDIAN II: Brand: GUARDIAN

## (undated) DEVICE — DRAPE C-ARM X-RAY

## (undated) DEVICE — CATH FOLEY 20FR 5ML 2 WAY SILICONE ELASTIMER

## (undated) DEVICE — GLOVE PI ULTRA TOUCH SZ.8.5

## (undated) DEVICE — DRAPE C-ARM BAG/DOME XR ELSTIC OPEN LF

## (undated) DEVICE — 3M™ STERI-DRAPE™ INCISE DRAPE 1050 (60CM X 45CM): Brand: STERI-DRAPE™

## (undated) DEVICE — EXIDINE 4 PCT

## (undated) DEVICE — GLOVE INDICATOR PI UNDERGLOVE SZ 7 BLUE

## (undated) DEVICE — GLOVE INDICATOR PI UNDERGLOVE SZ 7.5 BLUE

## (undated) DEVICE — SUT STRATAFIX SPIRAL 2-0 CT-1 30 CM SXPP1B410

## (undated) DEVICE — [HIGH FLOW INSUFFLATOR,  DO NOT USE IF PACKAGE IS DAMAGED,  KEEP DRY,  KEEP AWAY FROM SUNLIGHT,  PROTECT FROM HEAT AND RADIOACTIVE SOURCES.]: Brand: PNEUMOSURE

## (undated) DEVICE — PACK TUR

## (undated) DEVICE — DRAPE SHEET THREE QUARTER

## (undated) DEVICE — CANNULA SEAL

## (undated) DEVICE — CYSTO TUBING SINGLE IRRIGATION

## (undated) DEVICE — ANTI-FOG SOLUTION WITH FOAM PAD: Brand: DEVON

## (undated) DEVICE — PVC URETHRAL CATHETER: Brand: DOVER

## (undated) DEVICE — IV EXTENSION TUBING 33 IN

## (undated) DEVICE — LARGE NEEDLE DRIVER: Brand: ENDOWRIST

## (undated) DEVICE — STRL ALLENTOWN HYSTEROSCOPY PK: Brand: CARDINAL HEALTH

## (undated) DEVICE — DRAPE CRANI

## (undated) DEVICE — GLOVE PI ULTRA TOUCH SZ.7.0

## (undated) DEVICE — VESSEL SEALER EXTEND: Brand: ENDOWRIST

## (undated) DEVICE — GAUZE SPONGES,16 PLY: Brand: CURITY

## (undated) DEVICE — ARM DRAPE

## (undated) DEVICE — GUIDEWIRE AMPLATZ .035 180CM 6CM ST SS

## (undated) DEVICE — OCCLUDER COLPO-PNEUMO

## (undated) DEVICE — LUBRICANT INST ELECTROLUBE ANTISTK WO PAD

## (undated) DEVICE — FIBER HOLMIUM MP 200 MICRON SMARTSCOPE

## (undated) DEVICE — DILATOR: Brand: COOK

## (undated) DEVICE — MEDI-VAC NON-CONDUCTIVE SUCTION TUBING: Brand: CARDINAL HEALTH